# Patient Record
Sex: FEMALE | Race: OTHER
[De-identification: names, ages, dates, MRNs, and addresses within clinical notes are randomized per-mention and may not be internally consistent; named-entity substitution may affect disease eponyms.]

---

## 2018-06-26 ENCOUNTER — APPOINTMENT (OUTPATIENT)
Dept: SURGICAL ONCOLOGY | Facility: CLINIC | Age: 58
End: 2018-06-26

## 2021-10-18 ENCOUNTER — EMERGENCY (EMERGENCY)
Facility: HOSPITAL | Age: 61
LOS: 1 days | Discharge: HOME | End: 2021-10-18
Attending: EMERGENCY MEDICINE | Admitting: EMERGENCY MEDICINE
Payer: MEDICAID

## 2021-10-18 VITALS
SYSTOLIC BLOOD PRESSURE: 142 MMHG | OXYGEN SATURATION: 100 % | HEART RATE: 103 BPM | RESPIRATION RATE: 16 BRPM | TEMPERATURE: 98 F | DIASTOLIC BLOOD PRESSURE: 100 MMHG

## 2021-10-18 LAB
ALBUMIN SERPL ELPH-MCNC: 4.7 G/DL — SIGNIFICANT CHANGE UP (ref 3.3–5)
ALP SERPL-CCNC: 118 U/L — SIGNIFICANT CHANGE UP (ref 40–120)
ALT FLD-CCNC: 17 U/L — SIGNIFICANT CHANGE UP (ref 4–33)
ANION GAP SERPL CALC-SCNC: 13 MMOL/L — SIGNIFICANT CHANGE UP (ref 7–14)
AST SERPL-CCNC: 16 U/L — SIGNIFICANT CHANGE UP (ref 4–32)
BASOPHILS # BLD AUTO: 0.03 K/UL — SIGNIFICANT CHANGE UP (ref 0–0.2)
BASOPHILS NFR BLD AUTO: 0.4 % — SIGNIFICANT CHANGE UP (ref 0–2)
BILIRUB SERPL-MCNC: 0.2 MG/DL — SIGNIFICANT CHANGE UP (ref 0.2–1.2)
BUN SERPL-MCNC: 18 MG/DL — SIGNIFICANT CHANGE UP (ref 7–23)
CALCIUM SERPL-MCNC: 9.8 MG/DL — SIGNIFICANT CHANGE UP (ref 8.4–10.5)
CHLORIDE SERPL-SCNC: 104 MMOL/L — SIGNIFICANT CHANGE UP (ref 98–107)
CO2 SERPL-SCNC: 24 MMOL/L — SIGNIFICANT CHANGE UP (ref 22–31)
CREAT SERPL-MCNC: 0.88 MG/DL — SIGNIFICANT CHANGE UP (ref 0.5–1.3)
EOSINOPHIL # BLD AUTO: 0.09 K/UL — SIGNIFICANT CHANGE UP (ref 0–0.5)
EOSINOPHIL NFR BLD AUTO: 1.3 % — SIGNIFICANT CHANGE UP (ref 0–6)
GLUCOSE SERPL-MCNC: 119 MG/DL — HIGH (ref 70–99)
HCT VFR BLD CALC: 36.5 % — SIGNIFICANT CHANGE UP (ref 34.5–45)
HGB BLD-MCNC: 12.4 G/DL — SIGNIFICANT CHANGE UP (ref 11.5–15.5)
IANC: 4.75 K/UL — SIGNIFICANT CHANGE UP (ref 1.5–8.5)
IMM GRANULOCYTES NFR BLD AUTO: 0.3 % — SIGNIFICANT CHANGE UP (ref 0–1.5)
LYMPHOCYTES # BLD AUTO: 1.14 K/UL — SIGNIFICANT CHANGE UP (ref 1–3.3)
LYMPHOCYTES # BLD AUTO: 17 % — SIGNIFICANT CHANGE UP (ref 13–44)
MCHC RBC-ENTMCNC: 29.6 PG — SIGNIFICANT CHANGE UP (ref 27–34)
MCHC RBC-ENTMCNC: 34 GM/DL — SIGNIFICANT CHANGE UP (ref 32–36)
MCV RBC AUTO: 87.1 FL — SIGNIFICANT CHANGE UP (ref 80–100)
MONOCYTES # BLD AUTO: 0.69 K/UL — SIGNIFICANT CHANGE UP (ref 0–0.9)
MONOCYTES NFR BLD AUTO: 10.3 % — SIGNIFICANT CHANGE UP (ref 2–14)
NEUTROPHILS # BLD AUTO: 4.75 K/UL — SIGNIFICANT CHANGE UP (ref 1.8–7.4)
NEUTROPHILS NFR BLD AUTO: 70.7 % — SIGNIFICANT CHANGE UP (ref 43–77)
NRBC # BLD: 0 /100 WBCS — SIGNIFICANT CHANGE UP
NRBC # FLD: 0 K/UL — SIGNIFICANT CHANGE UP
PLATELET # BLD AUTO: 127 K/UL — LOW (ref 150–400)
POTASSIUM SERPL-MCNC: 3.5 MMOL/L — SIGNIFICANT CHANGE UP (ref 3.5–5.3)
POTASSIUM SERPL-SCNC: 3.5 MMOL/L — SIGNIFICANT CHANGE UP (ref 3.5–5.3)
PROT SERPL-MCNC: 7.9 G/DL — SIGNIFICANT CHANGE UP (ref 6–8.3)
RBC # BLD: 4.19 M/UL — SIGNIFICANT CHANGE UP (ref 3.8–5.2)
RBC # FLD: 12.8 % — SIGNIFICANT CHANGE UP (ref 10.3–14.5)
SODIUM SERPL-SCNC: 141 MMOL/L — SIGNIFICANT CHANGE UP (ref 135–145)
WBC # BLD: 6.72 K/UL — SIGNIFICANT CHANGE UP (ref 3.8–10.5)
WBC # FLD AUTO: 6.72 K/UL — SIGNIFICANT CHANGE UP (ref 3.8–10.5)

## 2021-10-18 PROCEDURE — 99285 EMERGENCY DEPT VISIT HI MDM: CPT

## 2021-10-18 PROCEDURE — 70450 CT HEAD/BRAIN W/O DYE: CPT | Mod: 26

## 2021-10-18 RX ORDER — TETANUS TOXOID, REDUCED DIPHTHERIA TOXOID AND ACELLULAR PERTUSSIS VACCINE, ADSORBED 5; 2.5; 8; 8; 2.5 [IU]/.5ML; [IU]/.5ML; UG/.5ML; UG/.5ML; UG/.5ML
0.5 SUSPENSION INTRAMUSCULAR ONCE
Refills: 0 | Status: COMPLETED | OUTPATIENT
Start: 2021-10-18 | End: 2021-10-18

## 2021-10-18 RX ADMIN — TETANUS TOXOID, REDUCED DIPHTHERIA TOXOID AND ACELLULAR PERTUSSIS VACCINE, ADSORBED 0.5 MILLILITER(S): 5; 2.5; 8; 8; 2.5 SUSPENSION INTRAMUSCULAR at 03:29

## 2021-10-18 NOTE — ED PROVIDER NOTE - NSFOLLOWUPINSTRUCTIONS_ED_ALL_ED_FT
YOU WERE SEEN IN THE EMERGENCY DEPARTMENT FOR Fall  WHILE HERE YOU HAD IMAGING AND LABS THAT SHOWED NO FRACTURES OR BRAIN BLEEDS.   PLEASE FOLLOW UP WITH YOUR PRIMARY CARE DOCTOR IN 3-5 DAYS.   DO NOT REMOVE THE STRIPS FROM YOUR HEAD. ALLOW THEM TO FALL OFF NATURALLY IN THE SHOWER.   PLEASE RETURN IF YOU HAVE DIZZINESS, EXCESSIVE SLEEPINESS OR PASS OUT.     Nonsutured Laceration Care      A laceration is a cut that may go through all layers of the skin and extend into the tissue that is right under the skin. This type of cut is usually stitched up (sutured) or closed with tape (adhesive strips) or skin glue shortly after the injury happens.    However, if the wound is dirty or if several hours pass before medical treatment is provided, it is likely that germs (bacteria) will enter the wound. Closing a laceration after bacteria have entered it increases the risk of infection. In these cases, your health care provider may leave the laceration open (nonsutured) and cover it with a bandage. This type of treatment helps prevent infection and allows the wound to heal from the deepest layer of tissue damage up to the surface.    An open fracture is a type of injury that may involve nonsutured lacerations. An open fracture is a break in a bone that happens along with lacerations through the skin at the fracture site.      What are the risks?  Caring for a nonsutured laceration is safe. However, problems may occur, including a higher risk for:  •Scarring.      •Infection.      •Slow healing.        Supplies needed:    •Soap.      •Hand .      •Sterile water or irrigation solution.      •Bandages (dressings).      •Clean towel.      •Antibiotic ointment.        How to care for your nonsutured laceration  Follow instructions from your health care provider about how to take care of your wound.  •Keep the wound clean and dry.      •Change any dressings as told by your health care provider. This includes changing the dressing when it starts to smell, or when it gets wet or dirty.    •Clean the wound one time each day, or as often as told by your health care provider. To clean your wound:  1.Wash your hands with soap and water. If soap and water are not available, use hand .      2.Remove any dressing as told by your health care provider.      3.Clean the wound with sterile water or irrigation solution as told by your health care provider.      4.Pat the wound dry with a clean towel. Do not rub the wound.      5.Apply a thin layer of antibiotic ointment to the wound as told by your health care provider. This will prevent infection and keep the dressing from sticking to the wound.      6.Apply a new dressing as told by your health care provider.      •Check your wound every day for signs of infection. Watch for:  •Redness, swelling, or pain.      •Fluid, blood, or pus.      •Bad smell on the wound or dressing.      •Warmth.        •Do not take baths, swim, or do anything that puts your wound underwater until your health care provider approves.      •Do not scratch or pick at the wound.        Follow these instructions at home:    •Take or apply over-the-counter and prescription medicines only as told by your health care provider.      •If you were prescribed an antibiotic medicine, take or apply it as told by your health care provider. Do not stop using the antibiotic even if your condition improves.      • Do not inject anything into the wound unless directed by your health care provider.      •Raise (elevate) the injured area above the level of your heart while you are sitting or lying down, if possible.    •If directed, put ice on the affected area:  •Put ice in a plastic bag.      •Place a towel between your skin and the bag.      •Leave the ice on for 20 minutes, 2–3 times a day.        •Keep all follow-up visits as told by your health care provider. This is important.        Contact a health care provider if:    •You received a tetanus shot and you have swelling, severe pain, redness, or bleeding at the injection site.      •You have a fever.      •Your pain is not controlled with medicine.      •You have increased redness, swelling, or pain at the site of your wound.      •You have fluid, blood, or pus coming from your wound.      •You notice a bad smell coming from your wound or your dressing.      •You notice something coming out of the wound, such as wood or glass.      •You notice a change in the color of your skin near your wound.      •You develop a new rash.      •You need to change the dressing frequently due to fluid, blood, or pus draining from the wound.      •You develop numbness around your wound.        Get help right away if:    •Your pain suddenly increases and is severe.      •You develop severe swelling around the wound.      •The wound is on your hand or foot and you cannot properly move a finger or toe.      •The wound is on your hand or foot, and you notice that your fingers or toes look pale or bluish.      •You have a red streak going away from your wound.        Summary    •A laceration is a cut that may go through all layers of the skin and extend into the tissue that is right under the skin. It is usually closed with stitches, tape, or skin glue shortly after the injury happens.      •If a wound is dirty or if several hours pass before medical treatment is provided, the laceration may be kept open (nonsutured) and covered with a bandage.      •This type of treatment helps prevent infection and allows the wound to heal from the deepest layer of tissue damage up to the surface.      •Follow instructions from your health care provider about how to take care of your wound.      This information is not intended to replace advice given to you by your health care provider. Make sure you discuss any questions you have with your health care provider.

## 2021-10-18 NOTE — ED PROVIDER NOTE - OBJECTIVE STATEMENT
62 yo f pm seizure disorder on Keppra presents after mechanical fall, no on blood thinners or aspirin. Pt was standing on a chair to reach a shelf when her foot got caught on the chair and she fell of. Pt denies any presyncope, cp, palpitations, sob before the fall. pt remembers the fall and denies having a seizure at the time. she states there was a 3 in diameter puddle of blood on the floor. denies n/v/f/c

## 2021-10-18 NOTE — ED PROVIDER NOTE - NS ED ROS FT
CONST: no fevers, no chills  EYES: no pain, no blurry vision   ENT: no sore throat, no epistaxis, no rhinorrhea  CV: no chest pain, no palpitations, no orthopnea, no extremity pain or swelling  RESP: no shortness of breath, no cough, no sputum, no pleurisy, no wheezing  ABD: no abdominal pain, no nausea, no vomiting, no diarrhea, no black or bloody stool  : no dysuria, no hematuria, no frequency, no urgency  MSK: no back pain, no neck pain, no extremity pain  NEURO: no sensory disturbances, no focal weakness, no dizziness  HEME: no easy bleeding or bruising  SKIN: no diaphoresis, no rash

## 2021-10-18 NOTE — ED ADULT NURSE NOTE - CHIEF COMPLAINT QUOTE
Pt. was standing on a chair and reaching for something and she fell. Possible hit head on closet door. Superficial abrasion noted on top of head. Son states there was a lot of blood on the floor. c/o dizziness at triage. Placed on stretcher. NAD noted. Denies LOC, vomiting, or lethargy. Pema(daughter):  [phone] , .

## 2021-10-18 NOTE — ED PROVIDER NOTE - CLINICAL SUMMARY MEDICAL DECISION MAKING FREE TEXT BOX
60 yo f pm seizure disorder on Keppra presents after mechanical fall, no on blood thinners or aspirin. 60 yo f pm seizure disorder on Keppra presents after mechanical fall, no on blood thinners or aspirin. labs, tdap, img. 60 yo f pm seizure disorder on Keppra presents after mechanical fall, not on blood thinners or aspirin. labs, tdap, img.

## 2021-10-18 NOTE — ED PROVIDER NOTE - PHYSICAL EXAMINATION
Const: Well-nourished, Well-developed, appearing stated age.  Eyes: no conjunctival injection, and symmetrical lids.  HEENT: 1 cm abrasion, currently non bleeding. no palpable masses.   Neck: Symmetric, trachea midline.   CVS: +S1/S2, Peripheral pulses 2+ and equal in all extremities.  RESP: Unlabored respiratory effort. Clear to auscultation bilaterally.  GI: Nontender/Nondistended, No CVA tenderness b/l.   MSK: Normocephalic/Atraumatic, Lower Extremities w/o calf tenderness or edema b/l.   Skin: Warm, dry and intact.   Neuro: CNs II-XII grossly intact. Motor & Sensation grossly intact.  Psych: Awake, Alert, & Oriented (AAO) x3. Appropriate mood and affect.

## 2021-10-18 NOTE — ED ADULT TRIAGE NOTE - CHIEF COMPLAINT QUOTE
Pt. was standing on a chair and reaching for something and she fell. Possible hit head on closet door. Superficial abrasion noted on top of head. Son states there was a lot of blood on the floor. c/o dizziness at triage. Placed on stretcher. NAD noted. Denies LOC, vomiting, or lethargy. Pt. was standing on a chair and reaching for something and she fell. Possible hit head on closet door. Superficial abrasion noted on top of head. Son states there was a lot of blood on the floor. c/o dizziness at triage. Placed on stretcher. NAD noted. Denies LOC, vomiting, or lethargy. Pema(daughter):  [phone] Pt. was standing on a chair and reaching for something and she fell. Possible hit head on closet door. Superficial abrasion noted on top of head. Son states there was a lot of blood on the floor. c/o dizziness at triage. Placed on stretcher. NAD noted. Denies LOC, vomiting, or lethargy. Pema(daughter):  [phone] , .

## 2021-10-18 NOTE — ED PROVIDER NOTE - ATTENDING CONTRIBUTION TO CARE
60yo F PMHX seizures on keppra p/w mechanical fall after foot got caught in chair and fell with +head trauma and laceration sustained.  Says she felt dizzy after seeing blood on floor from her head laceration but no LOC. Admits to 'a lot of blood' on the floor but unable to quantify amount    A/P  mechanical fall with laceration sustained  CTH, labs to check H/H, Tdap, lac repair 62yo F PMHX seizures on keppra p/w mechanical fall after foot got caught in chair and fell with +head trauma and laceration sustained.  Says she felt dizzy after seeing blood on floor from her head laceration but no LOC. Admits to 'a lot of blood' on the floor but unable to quantify amount. Has a headache near site of laceration but no other complaints- no facial pain, no neck or back pain, no chest or abd pain, no extremity pain. has been able to walk after fall    General: Patient alert in no apparent distress  Skin: Laceration ~1cm not bleeding to parietal scalp, otherwise dry and intact  HEENT: lac as above, otherwise Head atraumatic. Oral mucosa moist.   Eyes: Conjunctiva normal  Cardiac: Regular rhythm and rate. No pretibial edema b/l  Respiratory: Lungs clear b/l and symmetric. No respiratory distress. Able to speak in complete sentences.  Gastrointestinal: Abdomen soft, nondistended, nontender  Musculoskeletal: Moves all extremities spontaneously  Neurological: alert and oriented to person, place, and time  Psychiatric: Calm and cooperative    A/P  mechanical fall with laceration sustained  CTH, labs to check H/H, Tdap, lac repair

## 2021-10-18 NOTE — ED PROVIDER NOTE - PATIENT PORTAL LINK FT
You can access the FollowMyHealth Patient Portal offered by [facility] by registering at the following website: [email or web address]. By joining [facility]’s FollowMyHealth portal, you will also be able to view your health information using other applications (apps) compatible with our system.

## 2021-12-21 ENCOUNTER — EMERGENCY (EMERGENCY)
Facility: HOSPITAL | Age: 61
LOS: 1 days | Discharge: HOME | End: 2021-12-21
Attending: STUDENT IN AN ORGANIZED HEALTH CARE EDUCATION/TRAINING PROGRAM | Admitting: STUDENT IN AN ORGANIZED HEALTH CARE EDUCATION/TRAINING PROGRAM
Payer: MEDICAID

## 2021-12-21 VITALS
HEART RATE: 77 BPM | OXYGEN SATURATION: 100 % | SYSTOLIC BLOOD PRESSURE: 130 MMHG | DIASTOLIC BLOOD PRESSURE: 72 MMHG | TEMPERATURE: 98 F | RESPIRATION RATE: 18 BRPM

## 2021-12-21 VITALS
SYSTOLIC BLOOD PRESSURE: 106 MMHG | RESPIRATION RATE: 18 BRPM | TEMPERATURE: 98 F | DIASTOLIC BLOOD PRESSURE: 65 MMHG | HEART RATE: 83 BPM | OXYGEN SATURATION: 100 %

## 2021-12-21 LAB
ALBUMIN SERPL ELPH-MCNC: 4.1 G/DL — SIGNIFICANT CHANGE UP (ref 3.3–5)
ALP SERPL-CCNC: 81 U/L — SIGNIFICANT CHANGE UP (ref 40–120)
ALT FLD-CCNC: 16 U/L — SIGNIFICANT CHANGE UP (ref 4–33)
ANION GAP SERPL CALC-SCNC: 10 MMOL/L — SIGNIFICANT CHANGE UP (ref 7–14)
AST SERPL-CCNC: 12 U/L — SIGNIFICANT CHANGE UP (ref 4–32)
BASOPHILS # BLD AUTO: 0.04 K/UL — SIGNIFICANT CHANGE UP (ref 0–0.2)
BASOPHILS NFR BLD AUTO: 0.4 % — SIGNIFICANT CHANGE UP (ref 0–2)
BILIRUB SERPL-MCNC: 0.2 MG/DL — SIGNIFICANT CHANGE UP (ref 0.2–1.2)
BUN SERPL-MCNC: 23 MG/DL — SIGNIFICANT CHANGE UP (ref 7–23)
CALCIUM SERPL-MCNC: 9.2 MG/DL — SIGNIFICANT CHANGE UP (ref 8.4–10.5)
CHLORIDE SERPL-SCNC: 106 MMOL/L — SIGNIFICANT CHANGE UP (ref 98–107)
CO2 SERPL-SCNC: 24 MMOL/L — SIGNIFICANT CHANGE UP (ref 22–31)
CREAT SERPL-MCNC: 1.01 MG/DL — SIGNIFICANT CHANGE UP (ref 0.5–1.3)
EOSINOPHIL # BLD AUTO: 0.06 K/UL — SIGNIFICANT CHANGE UP (ref 0–0.5)
EOSINOPHIL NFR BLD AUTO: 0.6 % — SIGNIFICANT CHANGE UP (ref 0–6)
GLUCOSE SERPL-MCNC: 96 MG/DL — SIGNIFICANT CHANGE UP (ref 70–99)
HCT VFR BLD CALC: 33.4 % — LOW (ref 34.5–45)
HGB BLD-MCNC: 11.3 G/DL — LOW (ref 11.5–15.5)
IANC: 7.81 K/UL — SIGNIFICANT CHANGE UP (ref 1.5–8.5)
IMM GRANULOCYTES NFR BLD AUTO: 0.3 % — SIGNIFICANT CHANGE UP (ref 0–1.5)
LYMPHOCYTES # BLD AUTO: 1.25 K/UL — SIGNIFICANT CHANGE UP (ref 1–3.3)
LYMPHOCYTES # BLD AUTO: 12.7 % — LOW (ref 13–44)
MCHC RBC-ENTMCNC: 29.4 PG — SIGNIFICANT CHANGE UP (ref 27–34)
MCHC RBC-ENTMCNC: 33.8 GM/DL — SIGNIFICANT CHANGE UP (ref 32–36)
MCV RBC AUTO: 87 FL — SIGNIFICANT CHANGE UP (ref 80–100)
MONOCYTES # BLD AUTO: 0.68 K/UL — SIGNIFICANT CHANGE UP (ref 0–0.9)
MONOCYTES NFR BLD AUTO: 6.9 % — SIGNIFICANT CHANGE UP (ref 2–14)
NEUTROPHILS # BLD AUTO: 7.81 K/UL — HIGH (ref 1.8–7.4)
NEUTROPHILS NFR BLD AUTO: 79.1 % — HIGH (ref 43–77)
NRBC # BLD: 0 /100 WBCS — SIGNIFICANT CHANGE UP
NRBC # FLD: 0 K/UL — SIGNIFICANT CHANGE UP
PLATELET # BLD AUTO: 152 K/UL — SIGNIFICANT CHANGE UP (ref 150–400)
POTASSIUM SERPL-MCNC: 4.4 MMOL/L — SIGNIFICANT CHANGE UP (ref 3.5–5.3)
POTASSIUM SERPL-SCNC: 4.4 MMOL/L — SIGNIFICANT CHANGE UP (ref 3.5–5.3)
PROT SERPL-MCNC: 6.8 G/DL — SIGNIFICANT CHANGE UP (ref 6–8.3)
RBC # BLD: 3.84 M/UL — SIGNIFICANT CHANGE UP (ref 3.8–5.2)
RBC # FLD: 12.5 % — SIGNIFICANT CHANGE UP (ref 10.3–14.5)
SODIUM SERPL-SCNC: 140 MMOL/L — SIGNIFICANT CHANGE UP (ref 135–145)
WBC # BLD: 9.87 K/UL — SIGNIFICANT CHANGE UP (ref 3.8–10.5)
WBC # FLD AUTO: 9.87 K/UL — SIGNIFICANT CHANGE UP (ref 3.8–10.5)

## 2021-12-21 PROCEDURE — 99284 EMERGENCY DEPT VISIT MOD MDM: CPT | Mod: 25

## 2021-12-21 PROCEDURE — 93010 ELECTROCARDIOGRAM REPORT: CPT

## 2021-12-21 RX ORDER — SODIUM CHLORIDE 9 MG/ML
1000 INJECTION INTRAMUSCULAR; INTRAVENOUS; SUBCUTANEOUS ONCE
Refills: 0 | Status: COMPLETED | OUTPATIENT
Start: 2021-12-21 | End: 2021-12-21

## 2021-12-21 RX ORDER — ONDANSETRON 8 MG/1
4 TABLET, FILM COATED ORAL ONCE
Refills: 0 | Status: COMPLETED | OUTPATIENT
Start: 2021-12-21 | End: 2021-12-21

## 2021-12-21 RX ADMIN — SODIUM CHLORIDE 1000 MILLILITER(S): 9 INJECTION INTRAMUSCULAR; INTRAVENOUS; SUBCUTANEOUS at 18:35

## 2021-12-21 NOTE — ED PROVIDER NOTE - NSFOLLOWUPINSTRUCTIONS_ED_ALL_ED_FT
Rest, drink plenty of fluids.  Advance activity as tolerated.  Continue all previously prescribed medications as directed.  Follow up with your primary care physician in 48-72 hours- bring copies of your results.  Return to the ER for worsening or persistent symptoms, and/or ANY NEW OR CONCERNING SYMPTOMS. If you have issues obtaining follow up, please call: [phone] to obtain a doctor or specialist who takes your insurance in your area.  You may call [phone] to make an appointment with the internal medicine clinic.    Please follow up with the primary care doctor

## 2021-12-21 NOTE — ED ADULT TRIAGE NOTE - CHIEF COMPLAINT QUOTE
Pt brought in by EMS for an episode of dizziness after using the bathroom. Denies LOC. Pt states symptoms has no resolved. Denies chest pain/sob. Pt states she takes keppra.

## 2021-12-21 NOTE — ED PROVIDER NOTE - ATTENDING CONTRIBUTION TO CARE
I have personally performed a face to face medical and diagnostic evaluation of the patient. I have discussed with and reviewed the ACP's note and agree with the History, ROS, Physical Exam and MDM unless otherwise indicated. A brief summary of my personal evaluation and impression can be found below.    61F hx of HTN seizure on Keppra compliant with meds presents with a cc of lightheadedness and dizziness in setting of having diarrheal episode today NB, felt dizzy then went to bathroom and felt dizzy, laid self on floor no head trauma no LOC recalls entire event upon ED arrival feels better, no sx at this time, episode not c/w seizure activity recalls entire event. Denies numbness, tingling or loss of sensation. Denies loss of motor function. NO CP SOB or OG or palpitations prior to episode. No urinary complaints. Denies n/v/f/c/cp/sob. Denies headache, syncope, Denies chest palpitations, abdominal pain. Denies edema. Denies dysuria, hematuria, BRBPR, tarry stools, constipation.     All other ROS negative, except as above and as per HPI and ROS section.    VITALS: Initial triage and subsequent vitals have been reviewed by me.  GEN APPEARANCE: WDWN, alert, non-toxic, NAD  HEAD: Atraumatic.  EYES: PERRLa, EOMI, vision grossly intact.   NECK: Supple  CV: RRR, S1S2, no c/r/m/g. Cap refill <2 seconds. No bruits.   LUNGS: CTAB. No abnormal breath sounds.  ABDOMEN: Soft, NTND. No guarding or rebound.   MSK/EXT: No spinal or extremity point tenderness. No CVA ttp. Pelvis stable. No peripheral edema.  NEURO: Alert, follows commands. Weight bearing normal. Speech normal. Sensation and motor normal x4 extremities. CN2-12 normal, coordination normal, ambulating normally.   SKIN: Warm, dry and intact. No rash.  PSYCH: Appropriate    Plan/MDM: 61F hx of HTN seizure on Keppra compliant with meds presents with a cc of lightheadedness and dizziness in setting of having diarrheal episode today NB, felt dizzy then went to bathroom and felt dizzy, laid self on floor no head trauma no LOC recalls entire event upon ED arrival feels better, no sx at this time, episode not c/w seizure activity recalls entire event, c/f likely vasovagal near syncope event in the setting of diarrhea x1, will check labs give meds fluids reassess thereafter. ekg at baseline.

## 2021-12-21 NOTE — ED PROVIDER NOTE - PATIENT PORTAL LINK FT
You can access the FollowMyHealth Patient Portal offered by [facility] by registering at the following website: [email or web address]. By joining [facility]’s FollowMyHealth portal, you will also be able to view your health information using other applications (apps) compatible with our system.

## 2021-12-21 NOTE — ED PROVIDER NOTE - OBJECTIVE STATEMENT
this is a 61 y.o female with a PMhx of seizures on keppra hasn't had a seizure in 5 years. 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 She felt better after her diarrhea, and she laid down for a bit,. she did not have any episodes of vomiting or nausea. She states that she has been eating and drinking without difficulties. she denies having any dysuria, hematuria, urinary urgency or frequency. She did not have a seizures, she did not have any LOC, or CP, SOB, or OG.

## 2022-01-24 ENCOUNTER — EMERGENCY (EMERGENCY)
Facility: HOSPITAL | Age: 62
LOS: 1 days | Discharge: HOME | End: 2022-01-24
Attending: EMERGENCY MEDICINE | Admitting: EMERGENCY MEDICINE
Payer: MEDICAID

## 2022-01-24 VITALS
HEART RATE: 113 BPM | TEMPERATURE: 99 F | OXYGEN SATURATION: 100 % | SYSTOLIC BLOOD PRESSURE: 177 MMHG | RESPIRATION RATE: 18 BRPM | DIASTOLIC BLOOD PRESSURE: 93 MMHG

## 2022-01-24 PROCEDURE — 99284 EMERGENCY DEPT VISIT MOD MDM: CPT

## 2022-01-24 NOTE — ED ADULT NURSE NOTE - OBJECTIVE STATEMENT
pt received to rm 28 , a&ox4 Sky speaking. pt daughter at bedside , ambulatory , pmh of HTN, seizure on keppra , p/w b/l upper and lower extremities numbness and tingling w/ no change to motor function dizziness and diarrhea since yesterday . Pt breathing even and unlabored on room air. NSR on cardiac monitor. Denies fever, chills, cough, SOB, chest pain, palpitations, constipation. pt educated on fall precautions and confirms understanding via teach back method. Stretcher locked in lowest position with siderails up x2. Call bell and personal items within reach.

## 2022-01-24 NOTE — ED ADULT TRIAGE NOTE - CHIEF COMPLAINT QUOTE
Pt c/o dizziness x 1 month. Reports dizziness worsening over past few days w/ increased lethargy & numbness and tingling sensation to bilateral feet and hands. Denies headache, blurry vision, CP or SOB. Reports she is compliant with medications. PMHx Epilepsy, HTN Pt c/o dizziness x 1 month. Reports dizziness worsening over past few days w/ increased lethargy & numbness and tingling sensation to bilateral feet and hands. Denies headache, blurry vision, CP or SOB. Reports she is compliant with medications. FS by . PMHx Epilepsy, HTN

## 2022-01-24 NOTE — ED PROVIDER NOTE - OBJECTIVE STATEMENT
61 y.o female with a PMHx of seizures, HTN - on kescar hasn't had a seizure in 5 years. 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 She felt better after her diarrhea, and she laid down for a bit,. she did not have any episodes of vomiting or nausea. She states that she has been eating and drinking without difficulties. she denies having any dysuria, hematuria, urinary urgency or frequency. She did not have a seizures, she did not have any LOC, or CP, SOB, or OG. 61 y.o female with a PMHx of seizures, HTN - on keppra hasn't had a seizure in 5 years presenting for persistent dizziness.   Patient is Sky speaking - declined  as patient's daughter is at bedside to assist with translation. Patient reports that she received her COVID vaccine (Pfizer x3 - last dose 12/2021). Of note, patient began having dizziness in 12/2021, at which time she tested positive for COVID, shortly thereafter she received her booster vaccine. Since testing positive, patient has been experiencing persistent dizziness, positional in nature. Patient presented to the ED 12/2021 with similar presentation at which time the dizziness was attributed to dehydration. Since then, patient has been having persistent dizziness despite adequate hydration. She denies having any dysuria, hematuria, urinary urgency or frequency. She did not have a seizures, she did not have any LOC, or CP, SOB, or OG.

## 2022-01-24 NOTE — ED PROVIDER NOTE - PROGRESS NOTE DETAILS
Patient clinically improved after IVF, dizziness resolved. Plan to discharge with outpatient follow-up.

## 2022-01-24 NOTE — ED PROVIDER NOTE - PATIENT PORTAL LINK FT
You can access the FollowMyHealth Patient Portal offered by [facility] by registering at the following website: [email or web address]. By joining [facility]’s FollowMyHealth portal, you will also be able to view your health information using other applications (apps) compatible with our system.

## 2022-01-24 NOTE — ED PROVIDER NOTE - PHYSICAL EXAMINATION
Constitutional: NAD, lying comfortably in bed   Eyes: PERRL, sclera clear  ENMT: MMM, clear oropharynx  Neck: Supple, no cervical LAD  Respiratory: CTAB, no rales, rhonchi or wheezes  Cardiovascular: tachycardic, no m/g/r  Gastrointestinal: +BS, soft, NT/ND  Neurological: AAOx3  Psychiatric: Appropriate affect and mood  Extremities: No LE edema

## 2022-01-24 NOTE — ED PROVIDER NOTE - CLINICAL SUMMARY MEDICAL DECISION MAKING FREE TEXT BOX
Shyann Boyd M.D. - in this physician's medical judgment based on clinical history and physical exam - patient presenting with dizziness - likely orthostatic vs ?COVID related POTS.     Will order EKG, CBC, CMP, orthostatics and monitor.     Will follow-up on labs, analgesia, imaging, reassess and disposition as clinically indicated.

## 2022-01-24 NOTE — ED PROVIDER NOTE - NS ED ROS FT
General: +dizziness  Eyes: Denies blurry vision  ENMT: Denies rhinorrhea  Respiratory: Denies cough, SOB  Cardiovascular: Denies palpitations, CP  Gastrointestinal: Denies abd pain, N/V/D/C, hematochezia, melena  : Denies dysuria, increased freq  Musculoskeletal: Denies edema, joint pain  Endocrine: Denies increased thirst, increased frequency  Allergic/Immunologic: Denies rashes or hives  Neuro: Denies weakness, numbness  Psych: Denies anxiety, depression

## 2022-01-24 NOTE — ED ADULT NURSE NOTE - CHIEF COMPLAINT QUOTE
Pt c/o dizziness x 1 month. Reports dizziness worsening over past few days w/ increased lethargy & numbness and tingling sensation to bilateral feet and hands. Denies headache, blurry vision, CP or SOB. Reports she is compliant with medications. FS by . PMHx Epilepsy, HTN

## 2022-01-24 NOTE — ED PROVIDER NOTE - NSFOLLOWUPINSTRUCTIONS_ED_ALL_ED_FT
Follow up with your medical doctor in 2-3 days or call our clinic at [phone] and state you were seen in the Emergency Department and would like to be seen in clinic.   Take Tylenol 1g every six hours and supplement with ibuprofen 600mg, with food, every six hours which can be taken three hours apart from the Tylenol to have a layered effect.  Drink at least 2 Liters or 64 Ounces of water each day.  Return for any persistent, worsening symptoms, or ANY concerns at all.

## 2022-01-24 NOTE — ED PROVIDER NOTE - ATTENDING CONTRIBUTION TO CARE
DR. RILEY, ATTENDING MD-  I performed a face to face bedside interview with the patient regarding history of present illness, review of symptoms and past medical history. I completed an independent physical exam.  I have discussed the patient's plan of care with the resident.   Documentation as above in the note.    62 y/o female h/o seizures on keppra, htn here with int lightheadedness x1 month.  Additional c/o numbness and tingling to both hands and feet.  No fnd's on exam.  Eval for lyte abn, dehydration, anemia.  Obtain ekg cbc cmp tsh give ivf bolus reassess, likely dc with pcp f/u as o/p.

## 2022-01-24 NOTE — ED PROVIDER NOTE - NSFOLLOWUPCLINICS_GEN_ALL_ED_FT
[facility]  Neurology  [address] [place] [address] [address] [place] [address]  Phone: [phone]  Fax:

## 2022-01-25 VITALS
OXYGEN SATURATION: 99 % | SYSTOLIC BLOOD PRESSURE: 160 MMHG | RESPIRATION RATE: 16 BRPM | TEMPERATURE: 98 F | DIASTOLIC BLOOD PRESSURE: 84 MMHG | HEART RATE: 94 BPM

## 2022-01-25 LAB
ALBUMIN SERPL ELPH-MCNC: 3.9 G/DL — SIGNIFICANT CHANGE UP (ref 3.3–5)
ALP SERPL-CCNC: 107 U/L — SIGNIFICANT CHANGE UP (ref 40–120)
ALT FLD-CCNC: 18 U/L — SIGNIFICANT CHANGE UP (ref 4–33)
ANION GAP SERPL CALC-SCNC: 9 MMOL/L — SIGNIFICANT CHANGE UP (ref 7–14)
APPEARANCE UR: CLEAR — SIGNIFICANT CHANGE UP
AST SERPL-CCNC: 16 U/L — SIGNIFICANT CHANGE UP (ref 4–32)
BASOPHILS # BLD AUTO: 0.04 K/UL — SIGNIFICANT CHANGE UP (ref 0–0.2)
BASOPHILS NFR BLD AUTO: 0.6 % — SIGNIFICANT CHANGE UP (ref 0–2)
BILIRUB SERPL-MCNC: 0.2 MG/DL — SIGNIFICANT CHANGE UP (ref 0.2–1.2)
BILIRUB UR-MCNC: NEGATIVE — SIGNIFICANT CHANGE UP
BUN SERPL-MCNC: 14 MG/DL — SIGNIFICANT CHANGE UP (ref 7–23)
CALCIUM SERPL-MCNC: 8.9 MG/DL — SIGNIFICANT CHANGE UP (ref 8.4–10.5)
CHLORIDE SERPL-SCNC: 103 MMOL/L — SIGNIFICANT CHANGE UP (ref 98–107)
CO2 SERPL-SCNC: 26 MMOL/L — SIGNIFICANT CHANGE UP (ref 22–31)
COLOR SPEC: COLORLESS — SIGNIFICANT CHANGE UP
CREAT SERPL-MCNC: 0.82 MG/DL — SIGNIFICANT CHANGE UP (ref 0.5–1.3)
DIFF PNL FLD: NEGATIVE — SIGNIFICANT CHANGE UP
EOSINOPHIL # BLD AUTO: 0.09 K/UL — SIGNIFICANT CHANGE UP (ref 0–0.5)
EOSINOPHIL NFR BLD AUTO: 1.3 % — SIGNIFICANT CHANGE UP (ref 0–6)
GLUCOSE SERPL-MCNC: 172 MG/DL — HIGH (ref 70–99)
GLUCOSE UR QL: NEGATIVE — SIGNIFICANT CHANGE UP
HCT VFR BLD CALC: 35.1 % — SIGNIFICANT CHANGE UP (ref 34.5–45)
HGB BLD-MCNC: 11.4 G/DL — LOW (ref 11.5–15.5)
IANC: 4.18 K/UL — SIGNIFICANT CHANGE UP (ref 1.5–8.5)
IMM GRANULOCYTES NFR BLD AUTO: 0.6 % — SIGNIFICANT CHANGE UP (ref 0–1.5)
KETONES UR-MCNC: NEGATIVE — SIGNIFICANT CHANGE UP
LEUKOCYTE ESTERASE UR-ACNC: NEGATIVE — SIGNIFICANT CHANGE UP
LYMPHOCYTES # BLD AUTO: 1.72 K/UL — SIGNIFICANT CHANGE UP (ref 1–3.3)
LYMPHOCYTES # BLD AUTO: 25.4 % — SIGNIFICANT CHANGE UP (ref 13–44)
MAGNESIUM SERPL-MCNC: 2 MG/DL — SIGNIFICANT CHANGE UP (ref 1.6–2.6)
MCHC RBC-ENTMCNC: 28.9 PG — SIGNIFICANT CHANGE UP (ref 27–34)
MCHC RBC-ENTMCNC: 32.5 GM/DL — SIGNIFICANT CHANGE UP (ref 32–36)
MCV RBC AUTO: 89.1 FL — SIGNIFICANT CHANGE UP (ref 80–100)
MONOCYTES # BLD AUTO: 0.71 K/UL — SIGNIFICANT CHANGE UP (ref 0–0.9)
MONOCYTES NFR BLD AUTO: 10.5 % — SIGNIFICANT CHANGE UP (ref 2–14)
NEUTROPHILS # BLD AUTO: 4.18 K/UL — SIGNIFICANT CHANGE UP (ref 1.8–7.4)
NEUTROPHILS NFR BLD AUTO: 61.6 % — SIGNIFICANT CHANGE UP (ref 43–77)
NITRITE UR-MCNC: NEGATIVE — SIGNIFICANT CHANGE UP
NRBC # BLD: 0 /100 WBCS — SIGNIFICANT CHANGE UP
NRBC # FLD: 0 K/UL — SIGNIFICANT CHANGE UP
PH UR: 7 — SIGNIFICANT CHANGE UP (ref 5–8)
PHOSPHATE SERPL-MCNC: 3 MG/DL — SIGNIFICANT CHANGE UP (ref 2.5–4.5)
PLATELET # BLD AUTO: 144 K/UL — LOW (ref 150–400)
POTASSIUM SERPL-MCNC: 3.4 MMOL/L — LOW (ref 3.5–5.3)
POTASSIUM SERPL-SCNC: 3.4 MMOL/L — LOW (ref 3.5–5.3)
PROT SERPL-MCNC: 7.4 G/DL — SIGNIFICANT CHANGE UP (ref 6–8.3)
PROT UR-MCNC: ABNORMAL
RBC # BLD: 3.94 M/UL — SIGNIFICANT CHANGE UP (ref 3.8–5.2)
RBC # FLD: 12.6 % — SIGNIFICANT CHANGE UP (ref 10.3–14.5)
SODIUM SERPL-SCNC: 138 MMOL/L — SIGNIFICANT CHANGE UP (ref 135–145)
SP GR SPEC: 1 — SIGNIFICANT CHANGE UP (ref 1–1.05)
UROBILINOGEN FLD QL: SIGNIFICANT CHANGE UP
WBC # BLD: 6.78 K/UL — SIGNIFICANT CHANGE UP (ref 3.8–10.5)
WBC # FLD AUTO: 6.78 K/UL — SIGNIFICANT CHANGE UP (ref 3.8–10.5)

## 2022-01-25 RX ORDER — POTASSIUM CHLORIDE 20 MEQ
40 PACKET (EA) ORAL ONCE
Refills: 0 | Status: COMPLETED | OUTPATIENT
Start: 2022-01-25 | End: 2022-01-25

## 2022-01-25 RX ORDER — SODIUM CHLORIDE 9 MG/ML
1000 INJECTION INTRAMUSCULAR; INTRAVENOUS; SUBCUTANEOUS ONCE
Refills: 0 | Status: COMPLETED | OUTPATIENT
Start: 2022-01-25 | End: 2022-01-25

## 2022-01-25 RX ADMIN — SODIUM CHLORIDE 1000 MILLILITER(S): 9 INJECTION INTRAMUSCULAR; INTRAVENOUS; SUBCUTANEOUS at 00:42

## 2022-01-25 RX ADMIN — Medication 40 MILLIEQUIVALENT(S): at 02:13

## 2022-01-25 NOTE — ED ADULT NURSE REASSESSMENT NOTE - NS ED NURSE REASSESS COMMENT FT1
PT is resting in stretcher, easily arousable to verbal stimuli. no apparent distress noted. will continue to monitor. PT is resting in stretcher, easily arousable to verbal stimuli. no apparent distress noted. pt ambulated to the bathroom with out difficulty with an even and steady gait. will continue to monitor.

## 2022-02-06 ENCOUNTER — EMERGENCY (EMERGENCY)
Facility: HOSPITAL | Age: 62
LOS: 1 days | Discharge: HOME | End: 2022-02-06
Attending: EMERGENCY MEDICINE | Admitting: EMERGENCY MEDICINE
Payer: MEDICAID

## 2022-02-06 VITALS
TEMPERATURE: 98 F | SYSTOLIC BLOOD PRESSURE: 145 MMHG | HEART RATE: 89 BPM | RESPIRATION RATE: 18 BRPM | OXYGEN SATURATION: 100 % | DIASTOLIC BLOOD PRESSURE: 74 MMHG

## 2022-02-06 VITALS
DIASTOLIC BLOOD PRESSURE: 77 MMHG | HEART RATE: 68 BPM | OXYGEN SATURATION: 99 % | SYSTOLIC BLOOD PRESSURE: 135 MMHG | TEMPERATURE: 98 F | RESPIRATION RATE: 18 BRPM

## 2022-02-06 PROBLEM — R56.9 UNSPECIFIED CONVULSIONS: Chronic | Status: ACTIVE | Noted: 2022-01-24

## 2022-02-06 LAB
ALBUMIN SERPL ELPH-MCNC: 4 G/DL — SIGNIFICANT CHANGE UP (ref 3.3–5)
ALP SERPL-CCNC: 126 U/L — HIGH (ref 40–120)
ALT FLD-CCNC: 31 U/L — SIGNIFICANT CHANGE UP (ref 4–33)
ANION GAP SERPL CALC-SCNC: 10 MMOL/L — SIGNIFICANT CHANGE UP (ref 7–14)
APPEARANCE UR: CLEAR — SIGNIFICANT CHANGE UP
AST SERPL-CCNC: 24 U/L — SIGNIFICANT CHANGE UP (ref 4–32)
BASOPHILS # BLD AUTO: 0.03 K/UL — SIGNIFICANT CHANGE UP (ref 0–0.2)
BASOPHILS NFR BLD AUTO: 0.5 % — SIGNIFICANT CHANGE UP (ref 0–2)
BILIRUB SERPL-MCNC: 0.2 MG/DL — SIGNIFICANT CHANGE UP (ref 0.2–1.2)
BILIRUB UR-MCNC: NEGATIVE — SIGNIFICANT CHANGE UP
BUN SERPL-MCNC: 15 MG/DL — SIGNIFICANT CHANGE UP (ref 7–23)
CALCIUM SERPL-MCNC: 9.4 MG/DL — SIGNIFICANT CHANGE UP (ref 8.4–10.5)
CHLORIDE SERPL-SCNC: 107 MMOL/L — SIGNIFICANT CHANGE UP (ref 98–107)
CO2 SERPL-SCNC: 24 MMOL/L — SIGNIFICANT CHANGE UP (ref 22–31)
COLOR SPEC: COLORLESS — SIGNIFICANT CHANGE UP
CREAT SERPL-MCNC: 0.82 MG/DL — SIGNIFICANT CHANGE UP (ref 0.5–1.3)
DIFF PNL FLD: NEGATIVE — SIGNIFICANT CHANGE UP
EOSINOPHIL # BLD AUTO: 0.12 K/UL — SIGNIFICANT CHANGE UP (ref 0–0.5)
EOSINOPHIL NFR BLD AUTO: 2 % — SIGNIFICANT CHANGE UP (ref 0–6)
GLUCOSE SERPL-MCNC: 90 MG/DL — SIGNIFICANT CHANGE UP (ref 70–99)
GLUCOSE UR QL: NEGATIVE — SIGNIFICANT CHANGE UP
HCT VFR BLD CALC: 37.9 % — SIGNIFICANT CHANGE UP (ref 34.5–45)
HGB BLD-MCNC: 12.7 G/DL — SIGNIFICANT CHANGE UP (ref 11.5–15.5)
IANC: 3.77 K/UL — SIGNIFICANT CHANGE UP (ref 1.5–8.5)
IMM GRANULOCYTES NFR BLD AUTO: 0.5 % — SIGNIFICANT CHANGE UP (ref 0–1.5)
KETONES UR-MCNC: NEGATIVE — SIGNIFICANT CHANGE UP
LEUKOCYTE ESTERASE UR-ACNC: NEGATIVE — SIGNIFICANT CHANGE UP
LYMPHOCYTES # BLD AUTO: 1.61 K/UL — SIGNIFICANT CHANGE UP (ref 1–3.3)
LYMPHOCYTES # BLD AUTO: 26.4 % — SIGNIFICANT CHANGE UP (ref 13–44)
MCHC RBC-ENTMCNC: 29.4 PG — SIGNIFICANT CHANGE UP (ref 27–34)
MCHC RBC-ENTMCNC: 33.5 GM/DL — SIGNIFICANT CHANGE UP (ref 32–36)
MCV RBC AUTO: 87.7 FL — SIGNIFICANT CHANGE UP (ref 80–100)
MONOCYTES # BLD AUTO: 0.54 K/UL — SIGNIFICANT CHANGE UP (ref 0–0.9)
MONOCYTES NFR BLD AUTO: 8.9 % — SIGNIFICANT CHANGE UP (ref 2–14)
NEUTROPHILS # BLD AUTO: 3.77 K/UL — SIGNIFICANT CHANGE UP (ref 1.8–7.4)
NEUTROPHILS NFR BLD AUTO: 61.7 % — SIGNIFICANT CHANGE UP (ref 43–77)
NITRITE UR-MCNC: NEGATIVE — SIGNIFICANT CHANGE UP
NRBC # BLD: 0 /100 WBCS — SIGNIFICANT CHANGE UP
NRBC # FLD: 0 K/UL — SIGNIFICANT CHANGE UP
PH UR: 6.5 — SIGNIFICANT CHANGE UP (ref 5–8)
PLATELET # BLD AUTO: 192 K/UL — SIGNIFICANT CHANGE UP (ref 150–400)
POTASSIUM SERPL-MCNC: 4.2 MMOL/L — SIGNIFICANT CHANGE UP (ref 3.5–5.3)
POTASSIUM SERPL-SCNC: 4.2 MMOL/L — SIGNIFICANT CHANGE UP (ref 3.5–5.3)
PROT SERPL-MCNC: 7.6 G/DL — SIGNIFICANT CHANGE UP (ref 6–8.3)
PROT UR-MCNC: NEGATIVE — SIGNIFICANT CHANGE UP
RBC # BLD: 4.32 M/UL — SIGNIFICANT CHANGE UP (ref 3.8–5.2)
RBC # FLD: 13.1 % — SIGNIFICANT CHANGE UP (ref 10.3–14.5)
SODIUM SERPL-SCNC: 141 MMOL/L — SIGNIFICANT CHANGE UP (ref 135–145)
SP GR SPEC: 1.01 — SIGNIFICANT CHANGE UP (ref 1–1.05)
T3 SERPL-MCNC: 80 NG/DL — SIGNIFICANT CHANGE UP (ref 80–200)
T4 AB SER-ACNC: 8.08 UG/DL — SIGNIFICANT CHANGE UP (ref 5.1–13)
TROPONIN T, HIGH SENSITIVITY RESULT: <6 NG/L — SIGNIFICANT CHANGE UP
UROBILINOGEN FLD QL: SIGNIFICANT CHANGE UP
WBC # BLD: 6.1 K/UL — SIGNIFICANT CHANGE UP (ref 3.8–10.5)
WBC # FLD AUTO: 6.1 K/UL — SIGNIFICANT CHANGE UP (ref 3.8–10.5)

## 2022-02-06 PROCEDURE — 93010 ELECTROCARDIOGRAM REPORT: CPT

## 2022-02-06 PROCEDURE — 99285 EMERGENCY DEPT VISIT HI MDM: CPT | Mod: 25

## 2022-02-06 RX ORDER — SODIUM CHLORIDE 9 MG/ML
1000 INJECTION INTRAMUSCULAR; INTRAVENOUS; SUBCUTANEOUS ONCE
Refills: 0 | Status: COMPLETED | OUTPATIENT
Start: 2022-02-06 | End: 2022-02-06

## 2022-02-06 RX ADMIN — SODIUM CHLORIDE 1000 MILLILITER(S): 9 INJECTION INTRAMUSCULAR; INTRAVENOUS; SUBCUTANEOUS at 14:16

## 2022-02-06 NOTE — ED PROVIDER NOTE - CLINICAL SUMMARY MEDICAL DECISION MAKING FREE TEXT BOX
62 y/o F with episodic lightheadedness and generalized weakness; currently asymptomatic per pt  -cbc, cmp, trop, ekg, ua, ivf; reassess

## 2022-02-06 NOTE — ED PROVIDER NOTE - NSPTACCESSSVCSAPPTDETAILS_ED_ALL_ED_FT
Please establish endocrine f/u in 5-7 days as well as cardiology f/u in 5-7 days for lightheadedness

## 2022-02-06 NOTE — ED PROVIDER NOTE - PATIENT PORTAL LINK FT
You can access the FollowMyHealth Patient Portal offered by [facility] by registering at the following website: [email or web address]. By joining [employer]’s FollowMyHealth portal, you will also be able to view your health information using other applications (apps) compatible with our system.

## 2022-02-06 NOTE — ED PROVIDER NOTE - NSFOLLOWUPINSTRUCTIONS_ED_ALL_ED_FT
Rest, stay hydrated, take all meds as previously prescribed, Followup with your PMD within 2 days for post hospital visit. Recommend follow up with Endocrinologist regarding hypothyroidism as well as cardiologist regarding lightheadedness. Show your doctor and specialists all copies of labs given to you. Return for worsening symptoms, ex. fever, shortness of breath, chest pain, dizziness, palpitations, etc. Please read all the patient handouts.

## 2022-02-06 NOTE — ED PROVIDER NOTE - OBJECTIVE STATEMENT
62 y/o F PMH seizure d/o c/o episodes of lightheadedness and feeling generalized weakness especially first thing in the morning when she gets out of bed. Pt has had multiple ED visits for same sxs. Sxs began in December when pt had COVID and have been persistent ever since. Pt also has followed up with a neurologist and had MRI head and spine which were unremarkable per pt. Pt has not followed up with caridoogist for her sxs. Denies fever, chills, change in vision, abdominal pain, N/V, CP, SOB, frequent falls.

## 2022-02-06 NOTE — ED ADULT NURSE NOTE - OBJECTIVE STATEMENT
Pt awake and alert x 4 finger stick 93. pt with multiple co, co weakness x few days , dizziness , and loose  stool x few weeks. Pt denies abdominal pain. Pt was seen in ER 2 days ago for same co. Pt with no nausea or vomiting asking to eat. iv placed blood collected, pt awaiting md falcon.

## 2022-02-06 NOTE — ED ADULT NURSE NOTE - CHIEF COMPLAINT QUOTE
p/t c/o of generalized weakness for few days, seen in ED 2 days ago for same issue, no neuro deficits noted

## 2022-02-06 NOTE — ED PROVIDER NOTE - ATTENDING CONTRIBUTION TO CARE
Dr. Mittal: 62 yo female with PMH seizure disorder on Keppra, in ED with intermittent episodes of lightheadedness and extremity tingling (both feet) associated with "cold feeling".  Episodes worse in the morning.  Has been seen in ED few times for this without a definitive diagnosis.  Has also been seen by neurologist a few times, and had reportedly normal MRI head and spine within the last week.  Symptoms possibly began after pt had Covid 2.5 months ago, but unclear.  Pt continues to have symptoms and so came to ED.  On exam pt chronically-ill appearing but in NAD, heart RRR, lungs CTAB, abd NTND, extremities without swelling, strength 5/5 in all extremities and skin without rash.  Normal finger to nose bilaterally.  EOMI.  Gait slow but normal in ED.  Incidentally noted that pt hypothyroid on recent labs.  Pt informed of this, may partially account for pt's symptoms.  Low suspicion for acute findings on today's ED visit, but will re-check labs and hydrate and re-eval.  Discussed with pt and daughter need for continued outpatient follow up--consider return to PMD and neurology, as well as possible endocrine for thyroid.

## 2022-04-07 NOTE — ED ADULT TRIAGE NOTE - NSTRIAGECARE_GEN_A_ER
Face Mask/EKG Billing Type: Third-Party Bill Bill For Surgical Tray: no Expected Date Of Service: 04/07/2022

## 2023-03-27 NOTE — ED ADULT NURSE NOTE - CCCP TRG CHIEF CMPLNT
Detail Level: Detailed
Patient Specific Counseling (Will Not Stick From Patient To Patient): - recommended OTC Carpe for maintenance at night
laceration

## 2023-04-14 NOTE — ED PROVIDER NOTE - CLINICAL SUMMARY MEDICAL DECISION MAKING FREE TEXT BOX
this is a 61 y.o female with a PMhx of seizures on keppra hasn't had a seizure in 5 years. , and HTN presented to the ED complaining of having a episode of dizziness after she had diarrhea earlier today. dizziness-labs, fluids, medications reassess
head ache/complains of pain/discomfort

## 2023-06-12 ENCOUNTER — INPATIENT (INPATIENT)
Facility: HOSPITAL | Age: 63
LOS: 7 days | Discharge: HOME HEALTH | End: 2023-06-20
Attending: STUDENT IN AN ORGANIZED HEALTH CARE EDUCATION/TRAINING PROGRAM | Admitting: STUDENT IN AN ORGANIZED HEALTH CARE EDUCATION/TRAINING PROGRAM
Payer: MEDICAID

## 2023-06-12 VITALS
OXYGEN SATURATION: 100 % | TEMPERATURE: 98 F | HEART RATE: 110 BPM | RESPIRATION RATE: 16 BRPM | SYSTOLIC BLOOD PRESSURE: 117 MMHG | DIASTOLIC BLOOD PRESSURE: 87 MMHG

## 2023-06-12 PROCEDURE — 99285 EMERGENCY DEPT VISIT HI MDM: CPT

## 2023-06-12 PROCEDURE — 93010 ELECTROCARDIOGRAM REPORT: CPT

## 2023-06-12 RX ORDER — SODIUM CHLORIDE 9 MG/ML
1000 INJECTION INTRAMUSCULAR; INTRAVENOUS; SUBCUTANEOUS ONCE
Refills: 0 | Status: COMPLETED | OUTPATIENT
Start: 2023-06-12 | End: 2023-06-12

## 2023-06-12 RX ORDER — ONDANSETRON 8 MG/1
4 TABLET, FILM COATED ORAL ONCE
Refills: 0 | Status: COMPLETED | OUTPATIENT
Start: 2023-06-12 | End: 2023-06-12

## 2023-06-12 RX ORDER — LEVETIRACETAM 250 MG/1
2000 TABLET, FILM COATED ORAL EVERY 12 HOURS
Refills: 0 | Status: DISCONTINUED | OUTPATIENT
Start: 2023-06-12 | End: 2023-06-13

## 2023-06-12 RX ADMIN — LEVETIRACETAM 600 MILLIGRAM(S): 250 TABLET, FILM COATED ORAL at 23:43

## 2023-06-12 RX ADMIN — SODIUM CHLORIDE 1000 MILLILITER(S): 9 INJECTION INTRAMUSCULAR; INTRAVENOUS; SUBCUTANEOUS at 23:42

## 2023-06-12 RX ADMIN — Medication 1 MILLIGRAM(S): at 23:10

## 2023-06-12 RX ADMIN — ONDANSETRON 4 MILLIGRAM(S): 8 TABLET, FILM COATED ORAL at 23:53

## 2023-06-12 NOTE — ED ADULT NURSE NOTE - NSFALLHARMRISKINTERV_ED_ALL_ED

## 2023-06-12 NOTE — ED PROVIDER NOTE - OBJECTIVE STATEMENT
63-year-old female past medical history of epilepsy on Keppra presents with seizure.  Patient presented to ED with adult daughter feeling like she was going to have a seizure.  Patient with witnessed seizure general tonic-clonic in waiting area without fall or head strike.  Patient was wheeled back to trauma A in stretcher actively seizing with spontaneous resolution after approximately 60 seconds.  Patient with observed post ictal period, somnolent but protecting airway, no hypoxemia.  There is no reported fever, chills, nausea, vomiting, dysuria, diarrhea.  Per daughter at bedside the patient's last seizure was approximately 4 years ago.  No history of alcohol or illicit drug use.

## 2023-06-12 NOTE — ED PROVIDER NOTE - PHYSICAL EXAMINATION
GEN:  Somnolent, responsive to verbal stimuli, protecting airway, moving all extremities  HEENT:  NCAT, neck supple, EOMI, PERRLA, sclera anicteric, no conjunctival pallor or injection, no stridor, normal voice, no tonsillar exudate, uvula midline  CV:  regular rhythm and rate, s1/s2 audible, no murmurs, rubs or gallops, peripheral pulses 2+ and symmetric  PULM:  non-labored respirations, lungs clear to auscultation bilaterally, no wheezes, crackles or rales  ABD:  non distended, non-tender, no rebound, no guarding, negative Connor's sign, bowel sounds normal, no cvat  MSK:  no gross deformity, non-tender extremities and joints, range of motion grossly normal appearing, no extremity edema, extremities warm and well perfused   NEURO:  Somnolent, moving all extremities, nontremulous, no cranial nerve deficits  SKIN:  warm, dry, no rash or vesicles

## 2023-06-12 NOTE — ED ADULT TRIAGE NOTE - TEMPERATURE IN FAHRENHEIT (DEGREES F)
Talk to pt daughter she stated that since pt went had surgery for pacemaker dr brooke change pt januvia to 25 mg instead of 50mg pt daughter is worried because pt skin as dark since the medication has been change. Pt would like to talk dr coats since she is on call this week.    98

## 2023-06-12 NOTE — ED PROVIDER NOTE - SHIFT CHANGE DETAILS
SAMUEL:  Patient signed out to Dr. Morgan pending labs, head ct, neuro consult.  HD stable at time of signout.

## 2023-06-12 NOTE — ED PROVIDER NOTE - CLINICAL SUMMARY MEDICAL DECISION MAKING FREE TEXT BOX
63-year-old female past medical history of epilepsy on Keppra presents with seizure.  Patient with self-limited seizure in ED without recurrence.  Vital signs stable, afebrile, fingerstick glucose within normal limits.  Appears postictal currently.  Suspect breakthrough epileptic seizure of unclear etiology.  No apparent trauma or infectious etiology.  Patient given Keppra load, will follow labs head CT, possible neuro consult.  Disposition pending.

## 2023-06-12 NOTE — ED ADULT NURSE NOTE - LANGUAGE ASSISTANCE NEEDED
Yes-Patient/Caregiver accepts free interpretation services... daughter-stefanie kuo/Yes-Patient/Caregiver accepts free interpretation services...

## 2023-06-12 NOTE — ED ADULT NURSE NOTE - NSFALLUNIVINTERV_ED_ALL_ED
Bed/Stretcher in lowest position, wheels locked, appropriate side rails in place/Call bell, personal items and telephone in reach/Instruct patient to call for assistance before getting out of bed/chair/stretcher/Non-slip footwear applied when patient is off stretcher/[place] to call system/Physically safe environment - no spills, clutter or unnecessary equipment/Purposeful proactive rounding/Room/bathroom lighting operational, light cord in reach

## 2023-06-12 NOTE — ED ADULT NURSE NOTE - CHIEF COMPLAINT QUOTE
Pt c/o weakness since last night. Endorses nausea and twitching of hands and eyes. " Pt felt like she was going to have a seizure all day. Denies fever, chills, diarrhea, urinary symptoms, drug/ alchocol use, chest pain, headache .  Past Medical History: Epilepsy, HTN

## 2023-06-12 NOTE — ED ADULT TRIAGE NOTE - CHIEF COMPLAINT QUOTE
Pt c/o weakness since last night. Endorses nausea and twitching of hands and eyes. " Pt felt like she was going to have a seizure all day. Denies fever, chills, diarrhea, urinary symptoms, drug/ alchocol use, chest pain. .  Past Medical History: Epilepsy, HTN Pt c/o weakness since last night. Endorses nausea and twitching of hands and eyes. " Pt felt like she was going to have a seizure all day. Denies fever, chills, diarrhea, urinary symptoms, drug/ alchocol use, chest pain, headache .  Past Medical History: Epilepsy, HTN

## 2023-06-12 NOTE — ED ADULT NURSE NOTE - OBJECTIVE STATEMENT
as per triage nurse, "Pt c/o weakness since last night. Endorses nausea and twitching of hands and eyes. " Pt felt like she was going to have a seizure all day. Denies fever, chills, diarrhea, urinary symptoms, drug/ alchocol use, chest pain, headache .  Past Medical History: Epilepsy, HTN"

## 2023-06-13 ENCOUNTER — TRANSCRIPTION ENCOUNTER (OUTPATIENT)
Age: 63
End: 2023-06-13

## 2023-06-13 DIAGNOSIS — R56.9 UNSPECIFIED CONVULSIONS: ICD-10-CM

## 2023-06-13 DIAGNOSIS — Z90.49 ACQUIRED ABSENCE OF OTHER SPECIFIED PARTS OF DIGESTIVE TRACT: Chronic | ICD-10-CM

## 2023-06-13 DIAGNOSIS — Z29.9 ENCOUNTER FOR PROPHYLACTIC MEASURES, UNSPECIFIED: ICD-10-CM

## 2023-06-13 DIAGNOSIS — I10 ESSENTIAL (PRIMARY) HYPERTENSION: ICD-10-CM

## 2023-06-13 LAB
ALBUMIN SERPL ELPH-MCNC: 4.6 G/DL — SIGNIFICANT CHANGE UP (ref 3.3–5)
ALP SERPL-CCNC: 138 U/L — HIGH (ref 40–120)
ALT FLD-CCNC: 21 U/L — SIGNIFICANT CHANGE UP (ref 4–33)
ANION GAP SERPL CALC-SCNC: 25 MMOL/L — HIGH (ref 7–14)
APPEARANCE UR: CLEAR — SIGNIFICANT CHANGE UP
AST SERPL-CCNC: 22 U/L — SIGNIFICANT CHANGE UP (ref 4–32)
BASE EXCESS BLDV CALC-SCNC: -14 MMOL/L — LOW (ref -2–3)
BASE EXCESS BLDV CALC-SCNC: -3 MMOL/L — LOW (ref -2–3)
BASOPHILS # BLD AUTO: 0.07 K/UL — SIGNIFICANT CHANGE UP (ref 0–0.2)
BASOPHILS NFR BLD AUTO: 0.5 % — SIGNIFICANT CHANGE UP (ref 0–2)
BILIRUB SERPL-MCNC: 0.2 MG/DL — SIGNIFICANT CHANGE UP (ref 0.2–1.2)
BILIRUB UR-MCNC: NEGATIVE — SIGNIFICANT CHANGE UP
BLOOD GAS VENOUS COMPREHENSIVE RESULT: SIGNIFICANT CHANGE UP
BLOOD GAS VENOUS COMPREHENSIVE RESULT: SIGNIFICANT CHANGE UP
BUN SERPL-MCNC: 20 MG/DL — SIGNIFICANT CHANGE UP (ref 7–23)
CALCIUM SERPL-MCNC: 9.5 MG/DL — SIGNIFICANT CHANGE UP (ref 8.4–10.5)
CHLORIDE BLDV-SCNC: 104 MMOL/L — SIGNIFICANT CHANGE UP (ref 96–108)
CHLORIDE BLDV-SCNC: 108 MMOL/L — SIGNIFICANT CHANGE UP (ref 96–108)
CHLORIDE SERPL-SCNC: 104 MMOL/L — SIGNIFICANT CHANGE UP (ref 98–107)
CO2 BLDV-SCNC: 19.9 MMOL/L — LOW (ref 22–26)
CO2 BLDV-SCNC: 24 MMOL/L — SIGNIFICANT CHANGE UP (ref 22–26)
CO2 SERPL-SCNC: 14 MMOL/L — LOW (ref 22–31)
COLOR SPEC: COLORLESS — SIGNIFICANT CHANGE UP
CREAT SERPL-MCNC: 0.99 MG/DL — SIGNIFICANT CHANGE UP (ref 0.5–1.3)
DIFF PNL FLD: NEGATIVE — SIGNIFICANT CHANGE UP
EGFR: 64 ML/MIN/1.73M2 — SIGNIFICANT CHANGE UP
EOSINOPHIL # BLD AUTO: 0.06 K/UL — SIGNIFICANT CHANGE UP (ref 0–0.5)
EOSINOPHIL NFR BLD AUTO: 0.4 % — SIGNIFICANT CHANGE UP (ref 0–6)
GAS PNL BLDV: 139 MMOL/L — SIGNIFICANT CHANGE UP (ref 136–145)
GAS PNL BLDV: 142 MMOL/L — SIGNIFICANT CHANGE UP (ref 136–145)
GAS PNL BLDV: SIGNIFICANT CHANGE UP
GLUCOSE BLDV-MCNC: 111 MG/DL — HIGH (ref 70–99)
GLUCOSE BLDV-MCNC: 140 MG/DL — HIGH (ref 70–99)
GLUCOSE SERPL-MCNC: 135 MG/DL — HIGH (ref 70–99)
GLUCOSE UR QL: NEGATIVE — SIGNIFICANT CHANGE UP
HCO3 BLDV-SCNC: 18 MMOL/L — LOW (ref 22–29)
HCO3 BLDV-SCNC: 23 MMOL/L — SIGNIFICANT CHANGE UP (ref 22–29)
HCT VFR BLD CALC: 41.3 % — SIGNIFICANT CHANGE UP (ref 34.5–45)
HCT VFR BLDA CALC: 35 % — SIGNIFICANT CHANGE UP (ref 34.5–46.5)
HCT VFR BLDA CALC: 40 % — SIGNIFICANT CHANGE UP (ref 34.5–46.5)
HGB BLD CALC-MCNC: 11.6 G/DL — LOW (ref 11.7–16.1)
HGB BLD CALC-MCNC: 13.3 G/DL — SIGNIFICANT CHANGE UP (ref 11.7–16.1)
HGB BLD-MCNC: 13 G/DL — SIGNIFICANT CHANGE UP (ref 11.5–15.5)
IANC: 9.42 K/UL — HIGH (ref 1.8–7.4)
IMM GRANULOCYTES NFR BLD AUTO: 0.7 % — SIGNIFICANT CHANGE UP (ref 0–0.9)
KETONES UR-MCNC: NEGATIVE — SIGNIFICANT CHANGE UP
LACTATE BLDV-MCNC: 1.6 MMOL/L — SIGNIFICANT CHANGE UP (ref 0.5–2)
LACTATE BLDV-MCNC: 12.8 MMOL/L — CRITICAL HIGH (ref 0.5–2)
LEUKOCYTE ESTERASE UR-ACNC: NEGATIVE — SIGNIFICANT CHANGE UP
LYMPHOCYTES # BLD AUTO: 26.7 % — SIGNIFICANT CHANGE UP (ref 13–44)
LYMPHOCYTES # BLD AUTO: 3.95 K/UL — HIGH (ref 1–3.3)
MAGNESIUM SERPL-MCNC: 2.3 MG/DL — SIGNIFICANT CHANGE UP (ref 1.6–2.6)
MCHC RBC-ENTMCNC: 28.4 PG — SIGNIFICANT CHANGE UP (ref 27–34)
MCHC RBC-ENTMCNC: 31.5 GM/DL — LOW (ref 32–36)
MCV RBC AUTO: 90.2 FL — SIGNIFICANT CHANGE UP (ref 80–100)
MONOCYTES # BLD AUTO: 1.17 K/UL — HIGH (ref 0–0.9)
MONOCYTES NFR BLD AUTO: 7.9 % — SIGNIFICANT CHANGE UP (ref 2–14)
NEUTROPHILS # BLD AUTO: 9.42 K/UL — HIGH (ref 1.8–7.4)
NEUTROPHILS NFR BLD AUTO: 63.8 % — SIGNIFICANT CHANGE UP (ref 43–77)
NITRITE UR-MCNC: NEGATIVE — SIGNIFICANT CHANGE UP
NRBC # BLD: 0 /100 WBCS — SIGNIFICANT CHANGE UP (ref 0–0)
NRBC # FLD: 0 K/UL — SIGNIFICANT CHANGE UP (ref 0–0)
PCO2 BLDV: 42 MMHG — SIGNIFICANT CHANGE UP (ref 39–52)
PCO2 BLDV: 69 MMHG — HIGH (ref 39–52)
PH BLDV: 7.02 — LOW (ref 7.32–7.43)
PH BLDV: 7.34 — SIGNIFICANT CHANGE UP (ref 7.32–7.43)
PH UR: 6.5 — SIGNIFICANT CHANGE UP (ref 5–8)
PHOSPHATE SERPL-MCNC: 3.6 MG/DL — SIGNIFICANT CHANGE UP (ref 2.5–4.5)
PLATELET # BLD AUTO: 208 K/UL — SIGNIFICANT CHANGE UP (ref 150–400)
PO2 BLDV: 56 MMHG — HIGH (ref 25–45)
PO2 BLDV: 65 MMHG — HIGH (ref 25–45)
POTASSIUM BLDV-SCNC: 3.3 MMOL/L — LOW (ref 3.5–5.1)
POTASSIUM BLDV-SCNC: 3.9 MMOL/L — SIGNIFICANT CHANGE UP (ref 3.5–5.1)
POTASSIUM SERPL-MCNC: 3.4 MMOL/L — LOW (ref 3.5–5.3)
POTASSIUM SERPL-SCNC: 3.4 MMOL/L — LOW (ref 3.5–5.3)
PROLACTIN SERPL-MCNC: 39.6 NG/ML — HIGH (ref 3.4–24.1)
PROT SERPL-MCNC: 8 G/DL — SIGNIFICANT CHANGE UP (ref 6–8.3)
PROT UR-MCNC: ABNORMAL
RBC # BLD: 4.58 M/UL — SIGNIFICANT CHANGE UP (ref 3.8–5.2)
RBC # FLD: 13 % — SIGNIFICANT CHANGE UP (ref 10.3–14.5)
SAO2 % BLDV: 80.9 % — SIGNIFICANT CHANGE UP (ref 67–88)
SAO2 % BLDV: 87.7 % — SIGNIFICANT CHANGE UP (ref 67–88)
SODIUM SERPL-SCNC: 143 MMOL/L — SIGNIFICANT CHANGE UP (ref 135–145)
SP GR SPEC: 1.01 — SIGNIFICANT CHANGE UP (ref 1.01–1.05)
UROBILINOGEN FLD QL: SIGNIFICANT CHANGE UP
WBC # BLD: 14.78 K/UL — HIGH (ref 3.8–10.5)
WBC # FLD AUTO: 14.78 K/UL — HIGH (ref 3.8–10.5)

## 2023-06-13 PROCEDURE — 70450 CT HEAD/BRAIN W/O DYE: CPT | Mod: 26,MD

## 2023-06-13 PROCEDURE — 95720 EEG PHY/QHP EA INCR W/VEEG: CPT

## 2023-06-13 PROCEDURE — 71045 X-RAY EXAM CHEST 1 VIEW: CPT | Mod: 26

## 2023-06-13 PROCEDURE — 99223 1ST HOSP IP/OBS HIGH 75: CPT

## 2023-06-13 PROCEDURE — 99223 1ST HOSP IP/OBS HIGH 75: CPT | Mod: GC

## 2023-06-13 RX ORDER — LEVETIRACETAM 250 MG/1
750 TABLET, FILM COATED ORAL
Refills: 0 | Status: DISCONTINUED | OUTPATIENT
Start: 2023-06-13 | End: 2023-06-13

## 2023-06-13 RX ORDER — ACETAMINOPHEN 500 MG
650 TABLET ORAL EVERY 6 HOURS
Refills: 0 | Status: DISCONTINUED | OUTPATIENT
Start: 2023-06-13 | End: 2023-06-20

## 2023-06-13 RX ORDER — ONDANSETRON 8 MG/1
4 TABLET, FILM COATED ORAL EVERY 8 HOURS
Refills: 0 | Status: DISCONTINUED | OUTPATIENT
Start: 2023-06-13 | End: 2023-06-20

## 2023-06-13 RX ORDER — LOSARTAN POTASSIUM 100 MG/1
25 TABLET, FILM COATED ORAL DAILY
Refills: 0 | Status: DISCONTINUED | OUTPATIENT
Start: 2023-06-13 | End: 2023-06-16

## 2023-06-13 RX ORDER — LAMOTRIGINE 25 MG/1
25 TABLET, ORALLY DISINTEGRATING ORAL
Refills: 0 | Status: DISCONTINUED | OUTPATIENT
Start: 2023-06-13 | End: 2023-06-20

## 2023-06-13 RX ORDER — LEVETIRACETAM 250 MG/1
750 TABLET, FILM COATED ORAL ONCE
Refills: 0 | Status: COMPLETED | OUTPATIENT
Start: 2023-06-13 | End: 2023-06-13

## 2023-06-13 RX ORDER — LAMOTRIGINE 25 MG/1
25 TABLET, ORALLY DISINTEGRATING ORAL DAILY
Refills: 0 | Status: DISCONTINUED | OUTPATIENT
Start: 2023-06-13 | End: 2023-06-13

## 2023-06-13 RX ORDER — LANOLIN ALCOHOL/MO/W.PET/CERES
3 CREAM (GRAM) TOPICAL AT BEDTIME
Refills: 0 | Status: DISCONTINUED | OUTPATIENT
Start: 2023-06-13 | End: 2023-06-20

## 2023-06-13 RX ORDER — ENOXAPARIN SODIUM 100 MG/ML
40 INJECTION SUBCUTANEOUS EVERY 24 HOURS
Refills: 0 | Status: DISCONTINUED | OUTPATIENT
Start: 2023-06-13 | End: 2023-06-20

## 2023-06-13 RX ORDER — LEVETIRACETAM 250 MG/1
1500 TABLET, FILM COATED ORAL EVERY 12 HOURS
Refills: 0 | Status: DISCONTINUED | OUTPATIENT
Start: 2023-06-14 | End: 2023-06-20

## 2023-06-13 RX ADMIN — LEVETIRACETAM 600 MILLIGRAM(S): 250 TABLET, FILM COATED ORAL at 06:10

## 2023-06-13 RX ADMIN — LEVETIRACETAM 400 MILLIGRAM(S): 250 TABLET, FILM COATED ORAL at 23:34

## 2023-06-13 RX ADMIN — LAMOTRIGINE 25 MILLIGRAM(S): 25 TABLET, ORALLY DISINTEGRATING ORAL at 18:42

## 2023-06-13 RX ADMIN — ENOXAPARIN SODIUM 40 MILLIGRAM(S): 100 INJECTION SUBCUTANEOUS at 18:42

## 2023-06-13 RX ADMIN — LEVETIRACETAM 750 MILLIGRAM(S): 250 TABLET, FILM COATED ORAL at 18:42

## 2023-06-13 NOTE — CONSULT NOTE ADULT - SUBJECTIVE AND OBJECTIVE BOX
Neurology - Consult Note    -  Spectra: [phone] ([facility]), [phone] ([facility])  -    HPI: Patient SUSHMA MAINI is a 63y (1960) woman, Sky speaking, PMH epilepsy on Keppra, who presents to [facility] Ed for seizure. Patient was feeling as though she was about to have a seizure all day. Daughter brought her to hospital. Patient had 60 second GTC in triage. She had a post ictal period. Last seizure was 4 years ago per family.     . /87. RR 16. Temp 98F. O2 100% on RA  WBC 14.78. Hgb 13.0. MCV 90.2. RDW 13.0. . Neutrophils 9.42.   Na 143. K 3.4. AG 25. BUN 20. Cr 0.99. egfr 64. Glu 135. Mg 2.3. Ph 3.6. UA neg.      Review of Systems:  INCOMPLETE   CONSTITUTIONAL: No fevers or chills  EYES AND ENT: No visual changes or no throat pain   NECK: No pain or stiffness  RESPIRATORY: No hemoptysis or shortness of breath  CARDIOVASCULAR: No chest pain or palpitations  GASTROINTESTINAL: No melena or hematochezia  GENITOURINARY: No dysuria or hematuria  NEUROLOGICAL: +As stated in HPI above  SKIN: No itching, burning, rashes, or lesions   All other review of systems is negative unless indicated above.    Allergies:  penicillin (Unknown)      PMHx/PSHx/Family Hx: As above, otherwise see below   Seizure        Social Hx:  No current use of tobacco, alcohol, or illicit drugs  Lives with ***    Medications:  MEDICATIONS  (STANDING):  levETIRAcetam  IVPB 2000 milliGRAM(s) IV Intermittent every 12 hours    MEDICATIONS  (PRN):      Vitals:  T(C): 36.7 (06-12-23 @ 21:56), Max: 36.7 (06-12-23 @ 21:56)  HR: 110 (06-12-23 @ 21:56) (110 - 110)  BP: 117/87 (06-12-23 @ 21:56) (117/87 - 117/87)  RR: 16 (06-12-23 @ 21:56) (16 - 16)  SpO2: 100% (06-12-23 @ 21:56) (100% - 100%)    Physical Examination: INCOMPLETE  General - NAD  Cardiovascular - Peripheral pulses palpable, no edema  Eyes - Fundoscopy with flat, sharp optic discs and no hemorrhage or exudates; Fundoscopy not well visualized; Fundoscopy not performed due to safety precautions in the setting of the COVID-19 pandemic    Neurologic Exam:  Mental status - Awake, Alert, Oriented to person, place, and time. Speech fluent, repetition and naming intact. Follows simple and complex commands. Attention/concentration, recent and remote memory (including registration and recall), and fund of knowledge intact    Cranial nerves - PERRLA, VFF, EOMI, face sensation (V1-V3) intact b/l, facial strength intact without asymmetry b/l, hearing intact b/l, palate with symmetric elevation, trapezius OR sternocleidomastiod 5/5 strength b/l, tongue midline on protrusion with full lateral movement    Motor - Normal bulk and tone throughout. No pronator drift.  Strength testing            Deltoid      Biceps      Triceps     Wrist Extension    Wrist Flexion     Interossei         R            5                 5               5                     5                              5                        5                 5  L             5                 5               5                     5                              5                        5                 5              Hip Flexion    Hip Extension    Knee Flexion    Knee Extension    Dorsiflexion    Plantar Flexion  R              5                           5                       5                           5                            5                          5  L              5                           5                        5                           5                            5                          5    Sensation - Light touch/temperature OR pain/vibration intact throughout    DTR's -             Biceps      Triceps     Brachioradialis      Patellar    Ankle    Toes/plantar response  R             2+             2+                  2+                       2+            2+                 Down  L              2+             2+                 2+                        2+           2+                 Down    Coordination - Finger to Nose intact b/l. No tremors appreciated    Gait and station - Normal casual gait. Romberg (-)    Labs:                        13.0   14.78 )-----------( 208      ( 12 Jun 2023 23:19 )             41.3     06-12    143  |  104  |  20  ----------------------------<  135<H>  3.4<L>   |  14<L>  |  0.99    Ca    9.5      12 Jun 2023 23:19  Phos  3.6     06-12  Mg     2.30     06-12    TPro  8.0  /  Alb  4.6  /  TBili  0.2  /  DBili  x   /  AST  22  /  ALT  21  /  AlkPhos  138<H>  06-12    CAPILLARY BLOOD GLUCOSE      POCT Blood Glucose.: 135 mg/dL (12 Jun 2023 23:09)    LIVER FUNCTIONS - ( 12 Jun 2023 23:19 )  Alb: 4.6 g/dL / Pro: 8.0 g/dL / ALK PHOS: 138 U/L / ALT: 21 U/L / AST: 22 U/L / GGT: x               CSF:                  Radiology:  CT Head No Cont:  (13 Jun 2023 01:23)            ACC: [id number] EXAM: CT BRAIN ORDERED BY: KATELIN BALLARD    PROCEDURE DATE: 06/13/2023        INTERPRETATION: INDICATION: Seizures  TECHNIQUE: Multiple axial CT images were performed through the head without IV contrast using standard protocol. Coronal and sagittal images are reformatted from the axial data.    COMPARISON EXAMINATION: CT head 10/18/2021    FINDINGS:    CT HEAD:    HEMISPHERES: No mass or space occupying lesion. No brain edema or hemorrhagic foci are suggested. Small vessel white matter ischemic changes are noted.  VENTRICLES: No hydrocephalus. No midline shift.  POSTERIOR FOSSA: The brain stem and cerebellum are unremarkable.  EXTRACEREBRAL SPACES: No subdural or epidural collections are noted.  SKULL BASE AND CALVARIUM: Intact.  SINUSES AND MASTOIDS: Mild patchy mucosal thickening of the right maxillary sinus. The mastoid air cells are clear.  MISCELLANEOUS: No orbital or suprasellar abnormality noted.      IMPRESSION:    No hydrocephalus, mass effect, midline shift, acute intracranial hemorrhage, or brain edema.    --- End of Report ---          FRANTZ FREEDMAN MD; Resident Radiologist  This document has been electronically signed.  KATE EDWARDS MD; Attending Radiologist  This document has been electronically signed. Jun 13 2023 2:44AM     Neurology - Consult Note    -  Spectra: [phone] ([facility]), [phone] ([facility])  -    HPI: Patient SUSHMA MAINI is a 63y (1960) woman, Sky speaking, PMH HTN on losartan, epilepsy on Keppra, who presents to [facility] ED for seizure. Patient was feeling as though she was about to have a seizure all day. Daughter brought her to hospital. Patient had 60 second GTC in triage. She had a post ictal period. Last seizure was 4 years ago per family.     Patient seen at bedside by neurology. Patient's daughter at bedside, report patient said she was feeling unwell all day. She had some complaints of abdominal pain yesterday, as well as complaints of headache and chills today. She took an extra 500mg Keppra in the morning today. At about 8PM at night, she called her children saying that she feels like she is dying and to come immediately. Daughter called the ambulance. Per daughter, patient was laying flat, feeling as though she was not quite able to move for a bit, complaining of sensation of numbness throughout her body. She came in to the ED, started to have jerking movements, but jerks were originally more spaced out. Her pupils dilated, eyes rolled back, and she started to have full body shaking, lasting about a minute or so. Patient had urinary incontinence and foaming from the mouth during event. She was confused for 15 minutes or so afterwards. Patient received 1mg ativan and 2g Keppra in the ED after her GTC resolved. Patient thinks her seizure may have been triggered by anxiety regarding her children's future. Her last seizure was reported to be in 2015. Prior seizure events described as feelings of seizure coming followed by shaking activity with urinary incontinence and followed by period of confusion afterwards. She sees Dr Horan, neurologist, [facility] in [place]. She was being weaned off of her Keppra due to decreased seizure frequency. Was currently taking 500mg Keppra every night. Per patient's family, she has had episodes recently where she wakes up at night with her bed wet, and feeling confused.     . /87. RR 16. Temp 98F. O2 100% on RA  WBC 14.78. Hgb 13.0. MCV 90.2. RDW 13.0. . Neutrophils 9.42.   Na 143. K 3.4. AG 25. BUN 20. Cr 0.99. egfr 64. Glu 135. Mg 2.3. Ph 3.6. UA neg.      Review of Systems:   CONSTITUTIONAL: No fevers, + chills  EYES AND ENT: No visual changes or no throat pain   NECK: No pain or stiffness  RESPIRATORY: No hemoptysis or shortness of breath  CARDIOVASCULAR: No chest pain or palpitations  GASTROINTESTINAL: No melena or hematochezia  GENITOURINARY: No dysuria or hematuria  NEUROLOGICAL: +As stated in HPI above  SKIN: No itching, burning, rashes, or lesions      Allergies:  penicillin (Unknown)      PMHx/PSHx/Family Hx: As above, otherwise see below   Seizure        Social Hx:  No current use of tobacco, alcohol, or illicit drugs       Medications:  MEDICATIONS  (STANDING):  levETIRAcetam  IVPB 2000 milliGRAM(s) IV Intermittent every 12 hours    MEDICATIONS  (PRN):      Vitals:  T(C): 36.7 (06-12-23 @ 21:56), Max: 36.7 (06-12-23 @ 21:56)  HR: 110 (06-12-23 @ 21:56) (110 - 110)  BP: 117/87 (06-12-23 @ 21:56) (117/87 - 117/87)  RR: 16 (06-12-23 @ 21:56) (16 - 16)  SpO2: 100% (06-12-23 @ 21:56) (100% - 100%)    Physical Examination:   General - NAD  Cardiovascular - Peripheral pulses palpable, no edema  Eyes - Fundoscopy not performed due to safety precautions in the setting of the COVID-19 pandemic    Neurologic Exam:  Mental status - Patient sleeping but rouses to verbal and tactile stimuli, Drowsy, Oriented to person, place, but not time. Speech fluent, repetition and naming intact. Follows simple commands.      Cranial nerves - Pupils round and reactive to light, asymmetric, VFF, EOMI, face sensation (V1-V3) intact b/l, facial strength intact without asymmetry b/l, hearing intact b/l, palate with symmetric elevation, trapezius 5/5 strength b/l, tongue midline on protrusion with full lateral movement    Motor - Normal bulk and tone throughout. No pronator drift. Testing limited by patient effort, but patient moving all extremities AG, at least 4+ and symmetric.    Sensation - Light touch  intact throughout    DTR's - Biceps, Br 2+, patellars not elicited due to patient positioning, toes downgoing b/l     Coordination - Finger to Nose intact b/l. No tremors appreciated    Gait and station - deferred for patient safety    Labs:                        13.0   14.78 )-----------( 208      ( 12 Jun 2023 23:19 )             41.3     06-12    143  |  104  |  20  ----------------------------<  135<H>  3.4<L>   |  14<L>  |  0.99    Ca    9.5      12 Jun 2023 23:19  Phos  3.6     06-12  Mg     2.30     06-12    TPro  8.0  /  Alb  4.6  /  TBili  0.2  /  DBili  x   /  AST  22  /  ALT  21  /  AlkPhos  138<H>  06-12    CAPILLARY BLOOD GLUCOSE      POCT Blood Glucose.: 135 mg/dL (12 Jun 2023 23:09)    LIVER FUNCTIONS - ( 12 Jun 2023 23:19 )  Alb: 4.6 g/dL / Pro: 8.0 g/dL / ALK PHOS: 138 U/L / ALT: 21 U/L / AST: 22 U/L / GGT: x               CSF:                  Radiology:  CT Head No Cont:  (13 Jun 2023 01:23)            ACC: [id number] EXAM: CT BRAIN ORDERED BY: KATELIN BALLARD    PROCEDURE DATE: 06/13/2023        INTERPRETATION: INDICATION: Seizures  TECHNIQUE: Multiple axial CT images were performed through the head without IV contrast using standard protocol. Coronal and sagittal images are reformatted from the axial data.    COMPARISON EXAMINATION: CT head 10/18/2021    FINDINGS:    CT HEAD:    HEMISPHERES: No mass or space occupying lesion. No brain edema or hemorrhagic foci are suggested. Small vessel white matter ischemic changes are noted.  VENTRICLES: No hydrocephalus. No midline shift.  POSTERIOR FOSSA: The brain stem and cerebellum are unremarkable.  EXTRACEREBRAL SPACES: No subdural or epidural collections are noted.  SKULL BASE AND CALVARIUM: Intact.  SINUSES AND MASTOIDS: Mild patchy mucosal thickening of the right maxillary sinus. The mastoid air cells are clear.  MISCELLANEOUS: No orbital or suprasellar abnormality noted.      IMPRESSION:    No hydrocephalus, mass effect, midline shift, acute intracranial hemorrhage, or brain edema.    --- End of Report ---          FRANTZ FREEDMAN MD; Resident Radiologist  This document has been electronically signed.  KATE EDWARDS MD; Attending Radiologist  This document has been electronically signed. Jun 13 2023 2:44AM

## 2023-06-13 NOTE — H&P ADULT - NSHPPHYSICALEXAM_GEN_ALL_CORE
T(C): 36.7 (06-13-23 @ 10:02), Max: 36.7 (06-12-23 @ 21:56)  HR: 79 (06-13-23 @ 10:02) (79 - 110)  BP: 119/64 (06-13-23 @ 10:02) (117/87 - 140/84)  RR: 18 (06-13-23 @ 10:02) (16 - 18)  SpO2: 100% (06-13-23 @ 10:02) (100% - 100%)    CONSTITUTIONAL: Tired appearing, able to converse appropriately    EYES: No conjunctival or scleral injection, non-icteric  ENMT: Oral mucosa with moist membranes. Normal dentition; no pharyngeal injection or exudates  RESP: No respiratory distress, no use of accessory muscles; CTA b/l, no WRR  CV: RRR, +S1S2, no MRG; no JVD; no peripheral edema  GI: Soft, NT, ND, no rebound, no guarding; no palpable masses; no hepatosplenomegaly; no hernia palpated  MSK: Normal ROM without pain, no spinal tenderness, normal muscle strength/tone  SKIN: No rashes or ulcers noted; no subcutaneous nodules or induration palpable  NEURO: CN II-XII intact; normal reflexes in upper and lower extremities, sensation intact in upper and lower extremities b/l to light touch   PSYCH: Appropriate insight/judgment; A+O x 3, mood and affect appropriate, recent/remote memory intact

## 2023-06-13 NOTE — DISCHARGE NOTE PROVIDER - ATTENDING DISCHARGE PHYSICAL EXAMINATION:
GENERAL: NAD  HEAD:  Atraumatic, Normocephalic  EYES: EOMI, conjunctiva and sclera clear  NECK: Supple  CHEST/LUNG: Clear to auscultation bilaterally; No wheeze, rhonchi, crackles or rales  HEART: Regular rate and rhythm; No murmurs, rubs, or gallops  ABDOMEN: Soft, Nontender, Nondistended; Bowel sounds present  EXTREMITIES:  2+ Peripheral Pulses, No edema  PSYCH: AAOx3  NEUROLOGY: non-focal  SKIN: Warm and dry

## 2023-06-13 NOTE — CONSULT NOTE ADULT - ASSESSMENT
Assessment: 63y (1960) woman, Sky speaking, PMH HTN on losartan, epilepsy on Keppra, who presents to [facility] ED for seizure.  Patient had 60 second GTC in triage. Patient seen at bedside by neurology. Patient's daughter at bedside, report patient said she was feeling unwell all day. She had some complaints of abdominal pain yesterday, as well as complaints of headache and chills today. She took an extra 500mg Keppra in the morning today. At about 8PM at night, she called her children saying that she feels like she is dying and to come immediately. Daughter called the ambulance. Per daughter, patient was laying flat, feeling as though she was not quite able to move for a bit, complaining of sensation of numbness throughout her body. She came in to the ED, started to have jerking movements, but jerks were originally more spaced out. Her pupils dilated, eyes rolled back, and she started to have full body shaking, lasting about a minute or so. Patient had urinary incontinence and foaming from the mouth during event. She was confused for 15 minutes or so afterwards. Patient received 1mg ativan and 2g Keppra in the ED after her GTC resolved. Patient thinks her seizure may have been triggered by anxiety regarding her children's future. Her last seizure was reported to be in 2015. Prior seizure events described as feelings of seizure coming followed by shaking activity with urinary incontinence and followed by period of confusion afterwards. She sees Dr Horan, neurologist, [facility] in [place]. She was being weaned off of her Keppra due to decreased seizure frequency. Was currently taking 500mg Keppra every night. Per patient's family, she has had episodes recently where she wakes up at night with her bed wet, and feeling confused.     . /87. RR 16. Temp 98F. O2 100% on RA  WBC 14.78. Hgb 13.0. MCV 90.2. RDW 13.0. . Neutrophils 9.42.   Na 143. K 3.4. AG 25. BUN 20. Cr 0.99. egfr 64. Glu 135. Mg 2.3. Ph 3.6. UA neg.    Summary:  Patient presents with tonic-clonic jerking, incontinence, loss of consciousness, preceded by feeling of doom. Seizure lasted about a minute. Patient does a have history of seizure and describes episodes as LOC with generalized shaking. Last seizure was a few years ago.  Patient is compliant with medications but is only on a 500mg once a day regimen  CT head unremarkable. Exam nonfocal.    Impression: Breakthrough seizures, possibly focal (given aura) with secondary generalization    Recommend:  [] Video EEG monitoring  [] Check AED levels, CBC, CMP, Mg, Phos, urinalysis, drug and toxicology screen   [] Seizure, fall and aspiration precautions. Avoid sleep deprivation.   [] IV lorazepam 1mg IV prn for seizure activity lasting >3-5mins  [] baseline EKG and CXR    [] If clinically possible, avoid Wellbutrin and Tramadol.  Carefully weigh risk/benefit ratio when considering the use of other drugs that are also known to lower seizure threshold such as SSRI's, Wellbutrin and Tramadol.       [] Given concern for seizure, advise the patient with regards to risks and driving privileges associated with the [place] Guidelines. Advise patient regarding the risk of seizures and general seizure safety recommendations including not to be bathing alone, climbing to high places and operating heavy machinery, until cleared by follow-up outpatient Neurology. Reinforce the importance of compliance with medications. Discuss sleep hygiene and the risks of sleep disruption. Discuss the risk of death associated with seizures / SUDEP.     [] Please note: if patient has a convulsion, please document length of episode, specifically what patient was doing paying attention to eye opening vs closure, gaze deviation, shaking of extremities, tongue bite, urinary incontinence, any derangement of vital signs.      * Case and plan not final until Attending attestation * Assessment: 63y (1960) woman, Sky speaking, PMH HTN on losartan, epilepsy on Keppra, who presents to [facility] ED for seizure.  Patient had 60 second GTC in triage. Patient seen at bedside by neurology. Patient's daughter at bedside, report patient said she was feeling unwell all day. She had some complaints of abdominal pain yesterday, as well as complaints of headache and chills today. She took an extra 500mg Keppra in the morning today. At about 8PM at night, she called her children saying that she feels like she is dying and to come immediately. Daughter called the ambulance. Per daughter, patient was laying flat, feeling as though she was not quite able to move for a bit, complaining of sensation of numbness throughout her body. She came in to the ED, started to have jerking movements, but jerks were originally more spaced out. Her pupils dilated, eyes rolled back, and she started to have full body shaking, lasting about a minute or so. Patient had urinary incontinence and foaming from the mouth during event. She was confused for 15 minutes or so afterwards. Patient received 1mg ativan and 2g Keppra in the ED after her GTC resolved. Patient thinks her seizure may have been triggered by anxiety regarding her children's future. Her last seizure was reported to be in 2015. Prior seizure events described as feelings of seizure coming followed by shaking activity with urinary incontinence and followed by period of confusion afterwards. She sees Dr Horan, neurologist, [facility] in [place]. She was being weaned off of her Keppra due to decreased seizure frequency. Was currently taking 500mg Keppra every night. Per patient's family, she has had episodes recently where she wakes up at night with her bed wet, and feeling confused.     . /87. RR 16. Temp 98F. O2 100% on RA  WBC 14.78. Hgb 13.0. MCV 90.2. RDW 13.0. . Neutrophils 9.42.   Na 143. K 3.4. AG 25. BUN 20. Cr 0.99. egfr 64. Glu 135. Mg 2.3. Ph 3.6. UA neg.    Summary:  Patient presents with tonic-clonic jerking, incontinence, loss of consciousness, preceded by feeling of doom. Seizure lasted about a minute. Patient does a have history of seizure and describes episodes as LOC with generalized shaking. Last seizure was a few years ago.  Patient is compliant with medications but is only on a 500mg once a day regimen  CT head unremarkable. Exam nonfocal.    Impression: Breakthrough seizures, possibly focal (given aura) with secondary generalization    Recommend:  [] Video EEG monitoring  [] Check AED levels, CBC, CMP, Mg, Phos, urinalysis, drug and toxicology screen   [] basic infectious workup per primary team  [] Seizure, fall and aspiration precautions. Avoid sleep deprivation.   [] IV lorazepam 1mg IV prn for seizure activity lasting >3-5mins  [] baseline EKG and CXR    [] If clinically possible, avoid Wellbutrin and Tramadol.  Carefully weigh risk/benefit ratio when considering the use of other drugs that are also known to lower seizure threshold such as SSRI's, Wellbutrin and Tramadol.       [] Given concern for seizure, advise the patient with regards to risks and driving privileges associated with the [place] Guidelines. Advise patient regarding the risk of seizures and general seizure safety recommendations including not to be bathing alone, climbing to high places and operating heavy machinery, until cleared by follow-up outpatient Neurology. Reinforce the importance of compliance with medications. Discuss sleep hygiene and the risks of sleep disruption. Discuss the risk of death associated with seizures / SUDEP.     [] Please note: if patient has a convulsion, please document length of episode, specifically what patient was doing paying attention to eye opening vs closure, gaze deviation, shaking of extremities, tongue bite, urinary incontinence, any derangement of vital signs.      * Case and plan not final until Attending attestation *

## 2023-06-13 NOTE — DISCHARGE NOTE PROVIDER - NSFOLLOWUPCLINICSTOKEN_GEN_ALL_ED_FT
Hypothyroidism Hypothyroidism 042482: || ||00\01||False; [id number]: || ||00\01||False;[id number]: || ||00\01||False;

## 2023-06-13 NOTE — DISCHARGE NOTE PROVIDER - NSDCFUADDINST_GEN_ALL_CORE_FT
No driving for 1 year from date of last seizure as per [place] law  2. No taking a bath alone or showering with standing water > 2 inches  3. No swimming unsupervised  4. No use of automatic or semi-automatic firearms and no using other firearms unsupervised  5. No use of heavy machinery unsupervised  6. No cooking over an open flame on the front burners (back burners OK)

## 2023-06-13 NOTE — DISCHARGE NOTE PROVIDER - NSDCCPCAREPLAN_GEN_ALL_CORE_FT
PRINCIPAL DISCHARGE DIAGNOSIS  Diagnosis: Seizure  Assessment and Plan of Treatment: You presented to the hospital for seizures. Studies obtained (EEG) confirmed  seizures. Neurology was consulted and your medications were adjusted. Please continue to take your medications as prescribed following your discharge.  MR brain obtained sig for white matter microvascular ischemic disease. Seizures now well controlled. Per family, pt likely had been taking advil at home instead of keppra. Discussed w. neurology, recs dc on  Vimpat 100 BID, Keppra 1500 mg BID and Lamictal 25 mg bid. Slowly titrate lamotrigine up as outpatient - increase to 50 mg BID after 2 weeks on 25 BID. Discussed w/ family. Recommend outpatient follow up upon discharge at [address] [place] [address] with Dr. Flavio Vasquez. Hemodynamically stable for discharge.        PRINCIPAL DISCHARGE DIAGNOSIS  Diagnosis: Seizure  Assessment and Plan of Treatment: You presented to the hospital for seizures. Studies obtained (EEG) confirmed  seizures. Neurology was consulted and your medications were adjusted. Please continue to take your medications as prescribed following your discharge:  Vimpat 100 twice a day, Keppra 1500 mg twice a day and Lamictal 25 mg twice a day. Your lamictal can be increased to 50mg after 2 weeks.  Please follow up outpatient at [address] [place] [address] with Dr. Flavio Vasquez. Please return to the hospital should you experience any new or worsening symptoms.        PRINCIPAL DISCHARGE DIAGNOSIS  Diagnosis: Seizure  Assessment and Plan of Treatment: You presented to the hospital for seizures. Studies obtained (EEG) confirmed  seizures. Neurology was consulted and your medications were adjusted. Please continue to take your medications as prescribed following your discharge:  Vimpat 150 twice a day, Keppra 1500 mg twice a day and Lamictal 25 mg twice a day. Your lamictal can be increased to 50mg after 2 weeks (already received 1 week inpatient therefore the higher dose 50mg twice a day should be started on 6/27/230. Please return to the hospital should you experience any new or worsening symptoms.  Follow up [facility] Neurologist Trenton Mora or Ambrosio Shepard.

## 2023-06-13 NOTE — H&P ADULT - HISTORY OF PRESENT ILLNESS
64 y/o M with pmhx of seizure disorder and HTN who presents after seizure.           In ED, VSS. Received ativan 1mg x1, zofran 4mg x1 and 1L NS bolus. While in triage, patient had GTC seizure for over 1 minute. CTH negative for acute stroke, hemorrhage or lesions. Seen by neurology  in ED started on Keppra. Admitted to medicine for further workup and management  64 y/o M with pmhx of seizure disorder and HTN who presents after seizure. Patient states that for the last 2 days she has been feeling fatigued and stressed. Patient states that sh has been under alot of stress due to her son. She endorses feeling fatigued and having numbness throughout her body. She starts that she called her daughter stating that she felt like she was gonna die. her daughter then called EMS. While patient was en route to ER she had jerking movements in her extremities. While in the ED triage, patient had seizure like activity for over 1 minute. She received 1mg ativan in ED.       In ED, VSS. Received ativan 1mg x1, zofran 4mg x1 and 1L NS bolus. While in triage, patient had GTC seizure for over 1 minute. CTH negative for acute stroke, hemorrhage or lesions. Seen by neurology  in ED started on Keppra. Admitted to medicine for further workup and management.

## 2023-06-13 NOTE — H&P ADULT - NSHPREVIEWOFSYSTEMS_GEN_ALL_CORE
CONSTITUTIONAL: No fever, no chills  EYES: No eye pain, no acute blindness  Mouth: no pain in mouth, no cuts  RESPIRATORY: No cough, No sob  CARDIOVASCULAR: No CP, no palpitations  GASTROINTESTINAL: no abdominal pain, no n/v/d  GENITOURINARY: No dysuria, no hematuria  Heme: No easy bruising, no swelling of neck  NEUROLOGICAL: + seizure, No acute paralysis  SKIN: No itching, no rashes  MUSCULOSKELETAL: No acute joint pain, no joint swelling

## 2023-06-13 NOTE — PATIENT PROFILE ADULT - FALL HARM RISK - HARM RISK INTERVENTIONS

## 2023-06-13 NOTE — CONSULT NOTE ADULT - ATTENDING COMMENTS
Sky  : By myself ( Boni Parks MD).  I  agree with the findings and plan as documented in the Resident's note except below.  During my assessment, patient was very pleasant and cooperative and no focal deficit on exam. Etiology of seizure is due to the subtherapeutic dose of Keppra 500 mg daily. She confirmed that her seizure are controlled on therapeutic dose of Keppra. Last seizure was 8 year ago 2015. Will resume the Keppra 750 mg BID therapeutic dose.  She is concerned for side effects of Keppra and at baseline she also has anxiety . Therefore, she would benefit from Lamictal 25 mg BID if she tolerate the Lamictal without side effects can be increase on weekly basis and goal is 100 mg BID. Side effects were discussed the including rash. Routine VEEG for 30 minutes.   Outpatient follow up with neurologist,   Seizure Precautions discussed:  1. No driving for 1 year from date of last seizure as per [place] law  2. No taking a bath alone or showering with standing water > 2 inches  3. No swimming unsupervised  4. No use of automatic or semi-automatic firearms and no using other firearms unsupervised  5. No use of heavy machinery unsupervised  6. No cooking over an open flame on the front burners (back burners OK)  7. For additional recommendations please discuss with neurology or PCP as outpatient    I discussed the diagnosis and treatment plan with the patient and daughter.  All questions and concerns were addressed. The patient demonstrated good understanding of the treatment plan.  If you have any further questions, please do not hesitate to contact our team.  Thank you for allowing us to participate in this patient care.

## 2023-06-13 NOTE — DISCHARGE NOTE PROVIDER - NSDCMRMEDTOKEN_GEN_ALL_CORE_FT
LEVETIRACETAM 500 MG TABLET: TAKE 1 TABLET BY MOUTH AT NIGHT  LOSARTAN POTASSIUM 25 MG TAB: TAKE 1 TABLET BY MOUTH IN THE MORNING AND 1 TABLET IN THE EVENING.   Keppra 750 mg oral tablet: 2 tab(s) orally 2 times a day  lacosamide 150 mg oral tablet: 1 tab(s) orally 2 times a day MDD: 2 tabs  lamoTRIgine 25 mg oral tablet: 1 tab(s) orally 2 times a day Please take 25mg twice a day for 1 week then increase to 50mg twice a day (starting 6/27 should be on 50mg twice a day) --&gt; can go up as needed by your outpatient Neurologist  losartan 100 mg oral tablet: 1 tab(s) orally once a day  melatonin 3 mg oral tablet: 1 tab(s) orally once a day (at bedtime) As needed Insomnia  physical therapy: 3-5x/week  polyethylene glycol 3350 oral powder for reconstitution: 17 gram(s) orally once a day (at bedtime)  senna leaf extract oral tablet: 2 tab(s) orally once a day (at bedtime) As needed Constipation

## 2023-06-13 NOTE — DISCHARGE NOTE PROVIDER - HOSPITAL COURSE
63y  woman, Sky speaking, PMH HTN on losartan, epilepsy on Keppra, who presented to [facility] ED for seizures, neurology consulted. EEG w/ seizures. AEDs adjusted per neuro recs. MR brain obtained sig for white matter microvascular ischemic disease. Seizures now well controlled. Per family, pt likely had been taking advil at home instead of keppra. Discussed w. neurology, recs dc on  Vimpat 100 BID, Keppra 1500 mg BID and Lamictal 25 mg bid. Slowly titrate lamotrigine up as outpatient - increase to 50 mg BID after 2 weeks on 25 BID. Discussed w/ family. Recommend outpatient follow up upon discharge at [address] [place] [address] with Dr. Flavio Vasquez. Hemodynamically stable for discharge.    63y  woman, Sky speaking, PMH HTN on losartan, epilepsy on Keppra, who presented to [facility] ED for seizures, neurology consulted. EEG w/ seizures. AEDs adjusted per neuro recs. MR brain obtained sig for white matter microvascular ischemic disease. Seizures now well controlled. Per family, pt likely had been taking advil at home instead of keppra. Discussed w. neurology, recs dc on  Vimpat 150 BID, Keppra 1500 mg BID and Lamictal 25 mg bid. Slowly titrate lamotrigine up as outpatient - increase to 50 mg BID after 2 weeks on 25 BID. Discussed w/ family. Recommend outpatient follow up upon discharge at [address] [place] [address] with Dr. Flavio Vasquez. Hemodynamically stable for discharge.     PA Addendum: Spoke with Neurology for final medication recommendations. Meds sent to [facility] - all covered by insurance and they will do meds to bed. CM set up [facility] to assist with medications at home. Family at bedside in agreement w/ discharge.  PT script given to pt.

## 2023-06-13 NOTE — DISCHARGE NOTE PROVIDER - NSDCFUADDAPPT_GEN_ALL_CORE_FT
Follow up [facility] Neurologist Trenton Mora or Ambrosio Shepard.    [facility] outpatient clinic or the Crisis Clinic on discharge ([phone]).

## 2023-06-13 NOTE — DISCHARGE NOTE PROVIDER - CARE PROVIDER_API CALL
Trenton Mora  Neurology  [address]  [place] [address]  Phone: [phone]  Fax: [phone]  Follow Up Time:     Ambrosio Shepard  Neurology  [address]  [place] [address]  Phone: [phone]  Fax: [phone]  Follow Up Time:

## 2023-06-13 NOTE — DISCHARGE NOTE PROVIDER - NSFOLLOWUPCLINICS_GEN_ALL_ED_FT
[facility]  Neurology Autoimmune Encephalitis  [address] [place] [address]  Phone: [phone]  Fax: [phone]     [facility]  Neurology Autoimmune Encephalitis  [address] [place] [address]  Phone: [phone]  Fax: [phone]    [facility] Psychiatry  Psychiatry  [address] [place] [address]  Phone: [phone]  Fax:

## 2023-06-13 NOTE — H&P ADULT - PROBLEM SELECTOR PLAN 1
- presents with seizure activity in ED triage for > 1 min, was being weaned off Keppra as outpatient as outpatient, taking 500mg qhs   - neuro consulted, received 2g BID, now switched to 750mg BID + lamictal 25mg qd   - lactate 12.8 on admission, now improved to 1.6, likely elevated in setting   - vEEG pending   - CTH negative for stroke, hemorrhage with no masses or lesions   - aspiration, seizure precautions

## 2023-06-13 NOTE — H&P ADULT - NSHPLABSRESULTS_GEN_ALL_CORE
143  |  104  |  20  ----------------------------<  135<H>  3.4<L>   |  14<L>  |  0.99    Ca    9.5      2023 23:19  Phos  3.6       Mg     2.30         TPro  8.0  /  Alb  4.6  /  TBili  0.2  /  DBili  x   /  AST  22  /  ALT  21  /  AlkPhos  138<H>                      Urinalysis Basic - ( 2023 01:18 )    Color: Colorless / Appearance: Clear / S.012 / pH: x  Gluc: x / Ketone: Negative  / Bili: Negative / Urobili: <2 mg/dL   Blood: x / Protein: Trace / Nitrite: Negative   Leuk Esterase: Negative / RBC: x / WBC x   Sq Epi: x / Non Sq Epi: x / Bacteria: x                              13.0   14.78 )-----------( 208      ( 2023 23:19 )             41.3     CAPILLARY BLOOD GLUCOSE      POCT Blood Glucose.: 111 mg/dL (2023 06:04)  POCT Blood Glucose.: 135 mg/dL (2023 23:09)  POCT Blood Glucose.: 114 mg/dL (2023 21:55)    Blood Gas Source Venous: Venous ( @ 23:19)    < from: CT Head No Cont (23 @ 01:23) >      CT HEAD:    HEMISPHERES:  No mass or space occupying lesion.  No brain edema or   hemorrhagic foci are suggested. Small vessel white matter ischemic   changes are noted.  VENTRICLES:  No hydrocephalus. No midline shift.  POSTERIOR FOSSA:  The brain stem and cerebellum are unremarkable.  EXTRACEREBRAL SPACES:  No subdural or epidural collections are noted.  SKULL BASE AND CALVARIUM:  Intact.  SINUSES AND MASTOIDS:  Mild patchy mucosal thickening of the right   maxillary sinus. The mastoid air cells are clear.  MISCELLANEOUS:  No orbital or suprasellar abnormality noted.      IMPRESSION:    No hydrocephalus,mass effect, midline shift, acute intracranial   hemorrhage, or brain edema.    --- End of Report ---    < end of copied text >    < from: Xray Chest 1 View AP/PA (23 @ 00:09) >        FINDINGS:  The heart size is not well evaluated on this projection.  The lungs are clear.  There is no pneumothorax or pleural effusion.    IMPRESSION:  Clear lungs.    --- End of Report ---    < end of copied text >

## 2023-06-13 NOTE — CHART NOTE - NSCHARTNOTEFT_GEN_A_CORE
Spoke to neurology recommendations for AED as follows.    Keppra 750 twice daily add lamictal mg once daily.    Orders changed to above recommendations Spoke to neurology recommendations for AED as follows.    Keppra 750 twice daily add lamictal mg twice daily.    Orders changed to above recommendations

## 2023-06-13 NOTE — H&P ADULT - ASSESSMENT
62 y/o F with pmhx of seizure disorder and HTN who presents after seizure. Found to have >1 minute seizure in ED triage. Restarted on Keppra. Admitted for seizure treatment and management.

## 2023-06-14 LAB
A1C WITH ESTIMATED AVERAGE GLUCOSE RESULT: 5.6 % — SIGNIFICANT CHANGE UP (ref 4–5.6)
ALBUMIN SERPL ELPH-MCNC: 3.6 G/DL — SIGNIFICANT CHANGE UP (ref 3.3–5)
ALP SERPL-CCNC: 94 U/L — SIGNIFICANT CHANGE UP (ref 40–120)
ALT FLD-CCNC: 15 U/L — SIGNIFICANT CHANGE UP (ref 4–33)
ANION GAP SERPL CALC-SCNC: 11 MMOL/L — SIGNIFICANT CHANGE UP (ref 7–14)
AST SERPL-CCNC: 17 U/L — SIGNIFICANT CHANGE UP (ref 4–32)
BASOPHILS # BLD AUTO: 0.04 K/UL — SIGNIFICANT CHANGE UP (ref 0–0.2)
BASOPHILS NFR BLD AUTO: 0.6 % — SIGNIFICANT CHANGE UP (ref 0–2)
BILIRUB SERPL-MCNC: 0.3 MG/DL — SIGNIFICANT CHANGE UP (ref 0.2–1.2)
BUN SERPL-MCNC: 15 MG/DL — SIGNIFICANT CHANGE UP (ref 7–23)
CALCIUM SERPL-MCNC: 8.3 MG/DL — LOW (ref 8.4–10.5)
CHLORIDE SERPL-SCNC: 106 MMOL/L — SIGNIFICANT CHANGE UP (ref 98–107)
CHOLEST SERPL-MCNC: 171 MG/DL — SIGNIFICANT CHANGE UP
CO2 SERPL-SCNC: 21 MMOL/L — LOW (ref 22–31)
CREAT SERPL-MCNC: 0.92 MG/DL — SIGNIFICANT CHANGE UP (ref 0.5–1.3)
EGFR: 70 ML/MIN/1.73M2 — SIGNIFICANT CHANGE UP
EOSINOPHIL # BLD AUTO: 0.13 K/UL — SIGNIFICANT CHANGE UP (ref 0–0.5)
EOSINOPHIL NFR BLD AUTO: 2 % — SIGNIFICANT CHANGE UP (ref 0–6)
ESTIMATED AVERAGE GLUCOSE: 114 — SIGNIFICANT CHANGE UP
GLUCOSE SERPL-MCNC: 98 MG/DL — SIGNIFICANT CHANGE UP (ref 70–99)
HCT VFR BLD CALC: 32.7 % — LOW (ref 34.5–45)
HCV AB S/CO SERPL IA: 0.09 S/CO — SIGNIFICANT CHANGE UP (ref 0–0.99)
HCV AB SERPL-IMP: SIGNIFICANT CHANGE UP
HDLC SERPL-MCNC: 39 MG/DL — LOW
HGB BLD-MCNC: 10.7 G/DL — LOW (ref 11.5–15.5)
IANC: 3.8 K/UL — SIGNIFICANT CHANGE UP (ref 1.8–7.4)
IMM GRANULOCYTES NFR BLD AUTO: 0.3 % — SIGNIFICANT CHANGE UP (ref 0–0.9)
LIPID PNL WITH DIRECT LDL SERPL: 96 MG/DL — SIGNIFICANT CHANGE UP
LYMPHOCYTES # BLD AUTO: 2.05 K/UL — SIGNIFICANT CHANGE UP (ref 1–3.3)
LYMPHOCYTES # BLD AUTO: 30.9 % — SIGNIFICANT CHANGE UP (ref 13–44)
MAGNESIUM SERPL-MCNC: 2.2 MG/DL — SIGNIFICANT CHANGE UP (ref 1.6–2.6)
MCHC RBC-ENTMCNC: 28.1 PG — SIGNIFICANT CHANGE UP (ref 27–34)
MCHC RBC-ENTMCNC: 32.7 GM/DL — SIGNIFICANT CHANGE UP (ref 32–36)
MCV RBC AUTO: 85.8 FL — SIGNIFICANT CHANGE UP (ref 80–100)
MONOCYTES # BLD AUTO: 0.6 K/UL — SIGNIFICANT CHANGE UP (ref 0–0.9)
MONOCYTES NFR BLD AUTO: 9 % — SIGNIFICANT CHANGE UP (ref 2–14)
NEUTROPHILS # BLD AUTO: 3.8 K/UL — SIGNIFICANT CHANGE UP (ref 1.8–7.4)
NEUTROPHILS NFR BLD AUTO: 57.2 % — SIGNIFICANT CHANGE UP (ref 43–77)
NON HDL CHOLESTEROL: 132 MG/DL — HIGH
NRBC # BLD: 0 /100 WBCS — SIGNIFICANT CHANGE UP (ref 0–0)
NRBC # FLD: 0 K/UL — SIGNIFICANT CHANGE UP (ref 0–0)
PHOSPHATE SERPL-MCNC: 2.9 MG/DL — SIGNIFICANT CHANGE UP (ref 2.5–4.5)
PLATELET # BLD AUTO: 176 K/UL — SIGNIFICANT CHANGE UP (ref 150–400)
POTASSIUM SERPL-MCNC: 3.5 MMOL/L — SIGNIFICANT CHANGE UP (ref 3.5–5.3)
POTASSIUM SERPL-SCNC: 3.5 MMOL/L — SIGNIFICANT CHANGE UP (ref 3.5–5.3)
PROT SERPL-MCNC: 6.5 G/DL — SIGNIFICANT CHANGE UP (ref 6–8.3)
RBC # BLD: 3.81 M/UL — SIGNIFICANT CHANGE UP (ref 3.8–5.2)
RBC # FLD: 13.2 % — SIGNIFICANT CHANGE UP (ref 10.3–14.5)
SODIUM SERPL-SCNC: 138 MMOL/L — SIGNIFICANT CHANGE UP (ref 135–145)
TRIGL SERPL-MCNC: 178 MG/DL — HIGH
WBC # BLD: 6.64 K/UL — SIGNIFICANT CHANGE UP (ref 3.8–10.5)
WBC # FLD AUTO: 6.64 K/UL — SIGNIFICANT CHANGE UP (ref 3.8–10.5)

## 2023-06-14 PROCEDURE — 95720 EEG PHY/QHP EA INCR W/VEEG: CPT

## 2023-06-14 PROCEDURE — 99233 SBSQ HOSP IP/OBS HIGH 50: CPT

## 2023-06-14 PROCEDURE — 99233 SBSQ HOSP IP/OBS HIGH 50: CPT | Mod: GC

## 2023-06-14 RX ORDER — SENNA PLUS 8.6 MG/1
2 TABLET ORAL AT BEDTIME
Refills: 0 | Status: DISCONTINUED | OUTPATIENT
Start: 2023-06-14 | End: 2023-06-20

## 2023-06-14 RX ORDER — POLYETHYLENE GLYCOL 3350 17 G/17G
17 POWDER, FOR SOLUTION ORAL AT BEDTIME
Refills: 0 | Status: DISCONTINUED | OUTPATIENT
Start: 2023-06-14 | End: 2023-06-20

## 2023-06-14 RX ADMIN — LAMOTRIGINE 25 MILLIGRAM(S): 25 TABLET, ORALLY DISINTEGRATING ORAL at 06:00

## 2023-06-14 RX ADMIN — POLYETHYLENE GLYCOL 3350 17 GRAM(S): 17 POWDER, FOR SOLUTION ORAL at 22:10

## 2023-06-14 RX ADMIN — ENOXAPARIN SODIUM 40 MILLIGRAM(S): 100 INJECTION SUBCUTANEOUS at 17:52

## 2023-06-14 RX ADMIN — LAMOTRIGINE 25 MILLIGRAM(S): 25 TABLET, ORALLY DISINTEGRATING ORAL at 17:19

## 2023-06-14 RX ADMIN — SENNA PLUS 2 TABLET(S): 8.6 TABLET ORAL at 16:30

## 2023-06-14 RX ADMIN — LEVETIRACETAM 400 MILLIGRAM(S): 250 TABLET, FILM COATED ORAL at 05:59

## 2023-06-14 RX ADMIN — LOSARTAN POTASSIUM 25 MILLIGRAM(S): 100 TABLET, FILM COATED ORAL at 06:02

## 2023-06-14 RX ADMIN — LEVETIRACETAM 400 MILLIGRAM(S): 250 TABLET, FILM COATED ORAL at 17:17

## 2023-06-14 NOTE — PROGRESS NOTE ADULT - SUBJECTIVE AND OBJECTIVE BOX
SUBJECTIVE/INTERVAL HISTORY:  - Son and daughter at bed side, updated  - Remaining on vEEG due to discharges seen yesterday  - Transitioned to Keppra 1500 mg bid IVPB (given at ~06:00hr) and continuing lamictal 25 mg bid    VITALS & EXAMINATION:  Vital Signs Last 24 Hrs  T(C): 36.7 (2023 10:46), Max: 37.2 (2023 17:32)  T(F): 98 (2023 10:46), Max: 99 (2023 17:32)  HR: 88 (2023 10:46) (78 - 97)  BP: 134/75 (2023 10:46) (105/61 - 135/73)  BP(mean): --  RR: 18 (2023 10:46) (18 - 18)  SpO2: 99% (2023 10:46) (97% - 100%)    Parameters below as of 2023 10:46  Patient On (Oxygen Delivery Method): room air    Neurologic Exam:  Mental status - Eyes open. Responds well to verbal stimuli. Awake and alert. Oriented to person, place, time. Speech fluent. Follows simple commands.      Cranial nerves - Pupils round and reactive to light, asymmetric, VFF, EOMI, face sensation (V1-V3) intact b/l, facial strength intact without asymmetry b/l    Motor - Normal bulk and tone throughout. No pronator drift. 5/5 throughout all extremities    Sensation - Light touch  intact throughout    DTR's - Biceps, BR, patellar b/l 2+,  toes downgoing b/l     Coordination - Finger to Nose intact b/l. No tremors appreciated    Gait and station - deferred for patient safety      LABORATORY:  CBC                       10.7   6.64  )-----------( 176      ( 2023 06:35 )             32.7     Chem 06-14    138  |  106  |  15  ----------------------------<  98  3.5   |  21<L>  |  0.92    Ca    8.3<L>      2023 06:35  Phos  2.9     06-14  Mg     2.20     06-14    TPro  6.5  /  Alb  3.6  /  TBili  0.3  /  DBili  x   /  AST  17  /  ALT  15  /  AlkPhos  94  -    LFTs LIVER FUNCTIONS - ( 2023 06:35 )  Alb: 3.6 g/dL / Pro: 6.5 g/dL / ALK PHOS: 94 U/L / ALT: 15 U/L / AST: 17 U/L / GGT: x           Coagulopathy   Lipid Panel  Chol 171 LDL -- HDL 39<L> Trig 178<H>  A1c   Cardiac enzymes     U/A Urinalysis Basic - ( 2023 01:18 )    Color: Colorless / Appearance: Clear / S.012 / pH: x  Gluc: x / Ketone: Negative  / Bili: Negative / Urobili: <2 mg/dL   Blood: x / Protein: Trace / Nitrite: Negative   Leuk Esterase: Negative / RBC: x / WBC x   Sq Epi: x / Non Sq Epi: x / Bacteria: x      CSF  Immunological  Other    STUDIES & IMAGING:  Studies (EKG, EEG, EMG, etc):     EEG :  Clinical Impression:  At least 11 poorly lateralized electrographic seizures from the posterior head region captured as described, Last clear seizure at 4:46 23.   Evidence for left posterior temporal cerebral dysfunction    Radiology (XR, CT, MR, U/S, TTE/KIERAN):

## 2023-06-14 NOTE — PROGRESS NOTE ADULT - ASSESSMENT
63y (1960) woman, Sky speaking, PMH HTN on losartan, epilepsy on Keppra, who presents to [facility] ED for seizure. Had 60 seconds GTC at home with urinary incontinence. Takes home Keppra inconsistently, supposed to take 500 mg bid but has been taking 500 mg in AM. Sees Dr. Mara Clancy at outpatient neurology. Neuro exam stable. Overnight EEG showed 11 poorly lateralized electrographic seizures from posterior head region captured, last clear seizure at 0446 hr on 6/14/23. Given Keppra 1500 mg IVPB in 6AM.     Impression: Breakthrough seizure, likely due to medication non-adherence vs new epileptiform activities    Recommendations:  [] Continue vEEG monitoring  [] C/w Keppra 1500 mg BID and Lamictal 25 mg bid (on 6/27, can titrate up to 50 mg bid)  [] Neurocheck and vital q4  [] if patient has a convulsion, please document accurately the length of the episode, and specifically what the patient was doing paying attention to eye opening vs closure, gaze deviation, shaking of extremities, tongue bite, urinary incontinence, any derangement of vital signs  [] If patient has a generalized tonic clonic episode for >3 minutes or significant derangement of vital signs may administer Ativan 1mg IV and call [phone] for further reocmmendations  [] advise patient to not drive, operate heavy machinery, avoid heights, pools, bathtubs, locked doors.  [] will f/u outpatient upon discharge with Dr. Clancy    Case seen and discussed with general neurology attending Dr. Parks

## 2023-06-14 NOTE — CHART NOTE - NSCHARTNOTEFT_GEN_A_CORE
late entry     Patient assessed at bedside this AM after RN notified that patient with pupils L>R. Overnight patient also had episodes of seizure on EEG and meds increased. Chart reviewed and asymmetrical pupils were noted by neurology previously. Arrived at bedside, patient paying comfortably in bed, daughter Pema there as well. Patient denies any changes in vision, headache, nausea or vomiting weakness in extremities, or pain. Patient and daughter note that patient has not had any physical seizures overnight. Pupils assessed and appear asymmetric L > R, reactive to light. Equal strength in upper and lower limbs. AOx4. Daughter said that patient's speech is much improved compared to yesterday (previously speaking "slower"). Overall evaluation consistent with documented neurology findings from 6/13. Neurology to follow up with patient.      Tamanna Richards PA-C  Department of Internal Medicine  pager [phone], available on [employer] TEAMS

## 2023-06-14 NOTE — PROGRESS NOTE ADULT - ASSESSMENT
63 y.o. Female  w/ hx  seizure disorder, HTN who presents after seizure. Found to have >1 minute seizure in ED triage. Restarted on Keppra. Admitted for seizure treatment and management.

## 2023-06-14 NOTE — PROGRESS NOTE ADULT - ATTENDING COMMENTS
Sky  : By myself ( Boni Parks MD).  Patient was seen and examined at bedside.   Non focal exam except asymmetric left pupil is bigger and non reactive and right pupil is reactive.     I  agree with the findings and plan as documented in the Resident's note except below.  Etiology of status epilepticus is due to the subtherapeutic antiepileptic medication. Keppra dose was increase from 750 mg to 1500 mg BID. Lamictal 25 mg BID. Clinically patient is stable. Continue VEEG.   I discussed the diagnosis and treatment plan with the patient and daughter.  All questions and concerns were addressed. The patient demonstrated good understanding of the treatment plan.  If you have any further questions, please do not hesitate to contact our team.  Thank you for allowing us to participate in this patient care.

## 2023-06-14 NOTE — CHART NOTE - NSCHARTNOTEFT_GEN_A_CORE
[facility] NIGHT MEDICINE COVERAGE - Late Entry    Notified by Epilepsy Fellow, Dr. Starks, that patient was noted to have 5 discharges on her EEG, concerning for generalized seizure activity.  Fellow recommended to give Keppra 750mg IVPB STAT, and increase Keppra to 1500mg BID in AM.  Orders placed.  Pt assessed at bedside.  Pt is awake and alert, son and daughter present at bedside.  Son and daughter note pt's speech has been slower from her baseline throughout the day, pt otherwise denies any new symptoms.  Explained that discharges were noted on the EEG by the epilepsy doctor, which raises concern for seizures.  Explained the plan to give additional dose of Keppra tonight, and that the dose is being increased in the morning.  EEG still in place, monitoring ongoing.    Explained that slowing in speech can be a side-effect of the anti-seizure medications as they can cause fatigue.  Emotional support provided, all questions answered.  Neurology ([id number]) updated, will f/u any additional recommendations.  Case d/w overnight [facility], Dr. Shepard.  Pt stable at this time, will continue to monitor.    Vital Signs Last 24 Hrs  T(C): 37.2 (13 Jun 2023 21:19), Max: 37.2 (13 Jun 2023 17:32)  T(F): 98.9 (13 Jun 2023 21:19), Max: 99 (13 Jun 2023 17:32)  HR: 97 (13 Jun 2023 21:19) (79 - 97)  BP: 131/81 (13 Jun 2023 21:19) (105/61 - 140/84)  BP(mean): 101 (13 Jun 2023 04:17) (101 - 101)  RR: 18 (13 Jun 2023 21:19) (18 - 18)  SpO2: 98% (13 Jun 2023 21:19) (98% - 100%)    O2 Parameters below as of 13 Jun 2023 21:19  Patient On (Oxygen Delivery Method): room air    Justin Diaz PA-C  Department of Medicine - [facility]  In-House Pager: [phone]

## 2023-06-14 NOTE — PROVIDER CONTACT NOTE (OTHER) - ASSESSMENT
A&Ox4. VS stable. Left pupil measuring approx 5mm right measuring 3 mm. Both reacting to light. Face symmetry noted. NO limb drifts noted on assessment.

## 2023-06-14 NOTE — PROGRESS NOTE ADULT - PROBLEM SELECTOR PLAN 1
- presents with seizure activity in ED triage for > 1 min, was being weaned off Keppra as outpatient as outpatient, taking 500mg qhs   - neuro consulted, received 2g BID  -c/w Keppra 1500mg bid + lamictal 25mg qd   - lactate 12.8 on admission, now improved to 1.6, likely elevated in setting   - vEEG pending   - CTH negative for stroke, hemorrhage with no masses or lesions   - aspiration, seizure precautions - presents with seizure activity in ED triage for > 1 min, was being weaned off Keppra as outpatient as outpatient, taking 500mg qhs   - neuro consulted, received 2g BID  -c/w Keppra 1500mg bid + lamictal 25mg qd   - lactate 12.8 on admission, now improved to 1.6, likely elevated in setting   - vEEG pending   - CTH negative for stroke, hemorrhage with no masses or lesions   - aspiration, seizure precautions  -Patient says stress is causing depression and is ok with seeing a psychiatrist about it.   Consult psychiatry in a.m.

## 2023-06-14 NOTE — CHART NOTE - NSCHARTNOTEFT_GEN_A_CORE
Notified by RN that patient's family would like to speak with provider. Patient, daughter, and son seen at bedside by provider. Provider explained EEG process. Patient expressed concerns that perhaps her pharmacy gave her the wrong medication which caused breakthrough seizure. All questions and concerns addressed. Will continue to monitor and support patient and family.

## 2023-06-14 NOTE — CHART NOTE - NSCHARTNOTEFT_GEN_A_CORE
EEG reviewed from 08:00 until time of this note.     Two seizures captured from the posterior head region. Last seizure at 17:55. No clear clinical correlation. Neurology team informed of the findings.     Full report will be available tomorrow.

## 2023-06-14 NOTE — PROGRESS NOTE ADULT - SUBJECTIVE AND OBJECTIVE BOX
Patient is a 63y old  Female who presents with a chief complaint of seizures (2023 12:49)      SUBJECTIVE / OVERNIGHT EVENTS:    MEDICATIONS  (STANDING):  enoxaparin Injectable 40 milliGRAM(s) SubCutaneous every 24 hours  lamoTRIgine 25 milliGRAM(s) Oral two times a day  levETIRAcetam  IVPB 1500 milliGRAM(s) IV Intermittent every 12 hours  losartan 25 milliGRAM(s) Oral daily  polyethylene glycol 3350 17 Gram(s) Oral at bedtime    MEDICATIONS  (PRN):  acetaminophen     Tablet .. 650 milliGRAM(s) Oral every 6 hours PRN Temp greater or equal to 38C (100.4F), Mild Pain (1 - 3)  aluminum hydroxide/magnesium hydroxide/simethicone Suspension 30 milliLiter(s) Oral every 4 hours PRN Dyspepsia  LORazepam   Injectable 1 milliGRAM(s) IV Push every 6 hours PRN seizure activity  melatonin 3 milliGRAM(s) Oral at bedtime PRN Insomnia  ondansetron Injectable 4 milliGRAM(s) IV Push every 8 hours PRN Nausea and/or Vomiting  senna 2 Tablet(s) Oral at bedtime PRN Constipation      Vital Signs Last 24 Hrs  T(C): 37.2 (2023 13:15), Max: 37.2 (2023 17:32)  T(F): 98.9 (2023 13:15), Max: 99 (2023 17:32)  HR: 83 (2023 13:15) (78 - 97)  BP: 124/64 (2023 13:15) (105/61 - 135/73)  BP(mean): --  RR: 18 (2023 13:15) (18 - 18)  SpO2: 98% (2023 13:15) (97% - 100%)    Parameters below as of 2023 13:15  Patient On (Oxygen Delivery Method): room air      CAPILLARY BLOOD GLUCOSE        I&O's Summary    2023 07:01  -  2023 07:00  --------------------------------------------------------  IN: 580 mL / OUT: 0 mL / NET: 580 mL    2023 07:01  -  2023 14:11  --------------------------------------------------------  IN: 575 mL / OUT: 0 mL / NET: 575 mL        PHYSICAL EXAM:  GENERAL: NAD, well-developed  HEAD:  Atraumatic, Normocephalic  EYES: EOMI, PERRLA, conjunctiva and sclera clear  NECK: Supple, No JVD  CHEST/LUNG: Clear to auscultation bilaterally; No wheeze  HEART: Regular rate and rhythm; No murmurs, rubs, or gallops  ABDOMEN: Soft, Nontender, Nondistended; Bowel sounds present  EXTREMITIES:  2+ Peripheral Pulses, No clubbing, cyanosis, or edema  PSYCH: AAOx3  NEUROLOGY: non-focal  SKIN: No rashes or lesions    LABS:                        10.7   6.64  )-----------( 176      ( 2023 06:35 )             32.7     06-14    138  |  106  |  15  ----------------------------<  98  3.5   |  21<L>  |  0.92    Ca    8.3<L>      2023 06:35  Phos  2.9     06-14  Mg     2.20     06-14    TPro  6.5  /  Alb  3.6  /  TBili  0.3  /  DBili  x   /  AST  17  /  ALT  15  /  AlkPhos  94  06-14          Urinalysis Basic - ( 2023 01:18 )    Color: Colorless / Appearance: Clear / S.012 / pH: x  Gluc: x / Ketone: Negative  / Bili: Negative / Urobili: <2 mg/dL   Blood: x / Protein: Trace / Nitrite: Negative   Leuk Esterase: Negative / RBC: x / WBC x   Sq Epi: x / Non Sq Epi: x / Bacteria: x        RADIOLOGY & ADDITIONAL TESTS:    Imaging Personally Reviewed:    Consultant(s) Notes Reviewed:      Care Discussed with Consultants/Other Providers:   Patient is a 63y old  Female who presents with a chief complaint of seizures (2023 12:49)      SUBJECTIVE / OVERNIGHT EVENTS:  Patient has no new complaints. 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 Denies cp, SOB, abdominal pain, N/V/D     MEDICATIONS  (STANDING):  enoxaparin Injectable 40 milliGRAM(s) SubCutaneous every 24 hours  lamoTRIgine 25 milliGRAM(s) Oral two times a day  levETIRAcetam  IVPB 1500 milliGRAM(s) IV Intermittent every 12 hours  losartan 25 milliGRAM(s) Oral daily  polyethylene glycol 3350 17 Gram(s) Oral at bedtime    MEDICATIONS  (PRN):  acetaminophen     Tablet .. 650 milliGRAM(s) Oral every 6 hours PRN Temp greater or equal to 38C (100.4F), Mild Pain (1 - 3)  aluminum hydroxide/magnesium hydroxide/simethicone Suspension 30 milliLiter(s) Oral every 4 hours PRN Dyspepsia  LORazepam   Injectable 1 milliGRAM(s) IV Push every 6 hours PRN seizure activity  melatonin 3 milliGRAM(s) Oral at bedtime PRN Insomnia  ondansetron Injectable 4 milliGRAM(s) IV Push every 8 hours PRN Nausea and/or Vomiting  senna 2 Tablet(s) Oral at bedtime PRN Constipation      Vital Signs Last 24 Hrs  T(C): 37.2 (2023 13:15), Max: 37.2 (2023 17:32)  T(F): 98.9 (2023 13:15), Max: 99 (2023 17:32)  HR: 83 (2023 13:15) (78 - 97)  BP: 124/64 (2023 13:15) (105/61 - 135/73)  BP(mean): --  RR: 18 (2023 13:15) (18 - 18)  SpO2: 98% (2023 13:15) (97% - 100%)    Parameters below as of 2023 13:15  Patient On (Oxygen Delivery Method): room air      CAPILLARY BLOOD GLUCOSE        I&O's Summary    2023 07:01  -  2023 07:00  --------------------------------------------------------  IN: 580 mL / OUT: 0 mL / NET: 580 mL    2023 07:01  -  2023 14:11  --------------------------------------------------------  IN: 575 mL / OUT: 0 mL / NET: 575 mL        PHYSICAL EXAM:  GENERAL: NAD, well-developed  HEAD:  Atraumatic, Normocephalic  EYES: EOMI, PERRLA, conjunctiva and sclera clear  NECK: Supple, No JVD  CHEST/LUNG: Clear to auscultation bilaterally; No wheeze  HEART: Regular rate and rhythm; No murmurs, rubs, or gallops  ABDOMEN: Soft, Nontender, Nondistended; Bowel sounds present  EXTREMITIES:  2+ Peripheral Pulses, No clubbing, cyanosis, or edema  PSYCH: AAOx3  NEUROLOGY: non-focal  SKIN: No rashes or lesions    LABS:                        10.7   6.64  )-----------( 176      ( 2023 06:35 )             32.7     06-14    138  |  106  |  15  ----------------------------<  98  3.5   |  21<L>  |  0.92    Ca    8.3<L>      2023 06:35  Phos  2.9     -14  Mg     2.20     -    TPro  6.5  /  Alb  3.6  /  TBili  0.3  /  DBili  x   /  AST  17  /  ALT  15  /  AlkPhos  94  06-14          Urinalysis Basic - ( 2023 01:18 )    Color: Colorless / Appearance: Clear / S.012 / pH: x  Gluc: x / Ketone: Negative  / Bili: Negative / Urobili: <2 mg/dL   Blood: x / Protein: Trace / Nitrite: Negative   Leuk Esterase: Negative / RBC: x / WBC x   Sq Epi: x / Non Sq Epi: x / Bacteria: x        RADIOLOGY & ADDITIONAL TESTS:    Imaging Personally Reviewed:    Consultant(s) Notes Reviewed:      Care Discussed with Consultants/Other Providers:

## 2023-06-15 LAB
ANION GAP SERPL CALC-SCNC: 13 MMOL/L — SIGNIFICANT CHANGE UP (ref 7–14)
BUN SERPL-MCNC: 13 MG/DL — SIGNIFICANT CHANGE UP (ref 7–23)
CALCIUM SERPL-MCNC: 8.8 MG/DL — SIGNIFICANT CHANGE UP (ref 8.4–10.5)
CHLORIDE SERPL-SCNC: 105 MMOL/L — SIGNIFICANT CHANGE UP (ref 98–107)
CO2 SERPL-SCNC: 20 MMOL/L — LOW (ref 22–31)
CREAT SERPL-MCNC: 0.92 MG/DL — SIGNIFICANT CHANGE UP (ref 0.5–1.3)
EGFR: 70 ML/MIN/1.73M2 — SIGNIFICANT CHANGE UP
GLUCOSE SERPL-MCNC: 96 MG/DL — SIGNIFICANT CHANGE UP (ref 70–99)
HCT VFR BLD CALC: 34.7 % — SIGNIFICANT CHANGE UP (ref 34.5–45)
HGB BLD-MCNC: 11.5 G/DL — SIGNIFICANT CHANGE UP (ref 11.5–15.5)
LEVETIRACETAM SERPL-MCNC: <2 UG/ML — LOW (ref 10–40)
MAGNESIUM SERPL-MCNC: 2.2 MG/DL — SIGNIFICANT CHANGE UP (ref 1.6–2.6)
MCHC RBC-ENTMCNC: 28.5 PG — SIGNIFICANT CHANGE UP (ref 27–34)
MCHC RBC-ENTMCNC: 33.1 GM/DL — SIGNIFICANT CHANGE UP (ref 32–36)
MCV RBC AUTO: 85.9 FL — SIGNIFICANT CHANGE UP (ref 80–100)
NRBC # BLD: 0 /100 WBCS — SIGNIFICANT CHANGE UP (ref 0–0)
NRBC # FLD: 0 K/UL — SIGNIFICANT CHANGE UP (ref 0–0)
PHOSPHATE SERPL-MCNC: 3.6 MG/DL — SIGNIFICANT CHANGE UP (ref 2.5–4.5)
PLATELET # BLD AUTO: 174 K/UL — SIGNIFICANT CHANGE UP (ref 150–400)
POTASSIUM SERPL-MCNC: 3.8 MMOL/L — SIGNIFICANT CHANGE UP (ref 3.5–5.3)
POTASSIUM SERPL-SCNC: 3.8 MMOL/L — SIGNIFICANT CHANGE UP (ref 3.5–5.3)
RBC # BLD: 4.04 M/UL — SIGNIFICANT CHANGE UP (ref 3.8–5.2)
RBC # FLD: 13.2 % — SIGNIFICANT CHANGE UP (ref 10.3–14.5)
SODIUM SERPL-SCNC: 138 MMOL/L — SIGNIFICANT CHANGE UP (ref 135–145)
WBC # BLD: 6.09 K/UL — SIGNIFICANT CHANGE UP (ref 3.8–10.5)
WBC # FLD AUTO: 6.09 K/UL — SIGNIFICANT CHANGE UP (ref 3.8–10.5)

## 2023-06-15 PROCEDURE — 99233 SBSQ HOSP IP/OBS HIGH 50: CPT

## 2023-06-15 PROCEDURE — 99223 1ST HOSP IP/OBS HIGH 75: CPT

## 2023-06-15 PROCEDURE — 93010 ELECTROCARDIOGRAM REPORT: CPT

## 2023-06-15 PROCEDURE — 95720 EEG PHY/QHP EA INCR W/VEEG: CPT

## 2023-06-15 PROCEDURE — 99233 SBSQ HOSP IP/OBS HIGH 50: CPT | Mod: GC

## 2023-06-15 RX ORDER — LACOSAMIDE 50 MG/1
200 TABLET ORAL ONCE
Refills: 0 | Status: DISCONTINUED | OUTPATIENT
Start: 2023-06-15 | End: 2023-06-15

## 2023-06-15 RX ORDER — LACOSAMIDE 50 MG/1
100 TABLET ORAL EVERY 12 HOURS
Refills: 0 | Status: DISCONTINUED | OUTPATIENT
Start: 2023-06-15 | End: 2023-06-20

## 2023-06-15 RX ORDER — LEVETIRACETAM 250 MG/1
1500 TABLET, FILM COATED ORAL ONCE
Refills: 0 | Status: COMPLETED | OUTPATIENT
Start: 2023-06-15 | End: 2023-06-15

## 2023-06-15 RX ADMIN — LAMOTRIGINE 25 MILLIGRAM(S): 25 TABLET, ORALLY DISINTEGRATING ORAL at 06:20

## 2023-06-15 RX ADMIN — LEVETIRACETAM 400 MILLIGRAM(S): 250 TABLET, FILM COATED ORAL at 11:06

## 2023-06-15 RX ADMIN — LOSARTAN POTASSIUM 25 MILLIGRAM(S): 100 TABLET, FILM COATED ORAL at 06:20

## 2023-06-15 RX ADMIN — LACOSAMIDE 140 MILLIGRAM(S): 50 TABLET ORAL at 11:50

## 2023-06-15 RX ADMIN — ENOXAPARIN SODIUM 40 MILLIGRAM(S): 100 INJECTION SUBCUTANEOUS at 19:03

## 2023-06-15 RX ADMIN — Medication 4 MILLIGRAM(S): at 11:04

## 2023-06-15 RX ADMIN — LEVETIRACETAM 400 MILLIGRAM(S): 250 TABLET, FILM COATED ORAL at 06:20

## 2023-06-15 RX ADMIN — LEVETIRACETAM 400 MILLIGRAM(S): 250 TABLET, FILM COATED ORAL at 18:59

## 2023-06-15 RX ADMIN — LAMOTRIGINE 25 MILLIGRAM(S): 25 TABLET, ORALLY DISINTEGRATING ORAL at 19:02

## 2023-06-15 RX ADMIN — LACOSAMIDE 120 MILLIGRAM(S): 50 TABLET ORAL at 19:10

## 2023-06-15 NOTE — BH CONSULTATION LIAISON ASSESSMENT NOTE - OTHER PAST PSYCHIATRIC HISTORY (INCLUDE DETAILS REGARDING ONSET, COURSE OF ILLNESS, INPATIENT/OUTPATIENT TREATMENT)
Per daughter: saw psychiatrist following death of  2021 for grieving/anxiety, possible history of panic attacks, no history of medication use of psychiatric admission

## 2023-06-15 NOTE — PROGRESS NOTE ADULT - ATTENDING COMMENTS
Sky  : By myself (Boni Parks MD).  Patient was seen and examined at bedside. During follow up visit, patient's daughter was present and I deeply appreciate that.   Non focal exam except asymmetric left pupil is bigger and non reactive and right pupil is reactive.     I  agree with the findings and plan as documented in the Resident's note except below.  Etiology of refractory status epilepticus is due to the subtherapeutic antiepileptic medication. Due to the refractory status epilepticus, Ativan 4 mg stat, Keppra 1500 mg stat ( extra dose ) and start on Vimpat 200 mg stat and 100 mg standing.    Continue Keppra 1500 mg BID. Lamictal 25 mg BID. Through out admission, clinically patient is stable.   Continue VEEG.   MRI brain with seizure protocol once patient is stable and remains seizure free.   Continue medical management, neuro- check and fall precaution.  GI and DVT prophylaxis.  Patient is at high risk for complications and morbidity or mortality. There is high probability of imminent or life threatening deterioration in the patient's condition.  I discussed the diagnosis and treatment plan with the patient and daughter.  All questions and concerns were addressed. The patient demonstrated good understanding of the treatment plan.  If you have any further questions, please do not hesitate to contact our team.  Thank you for allowing us to participate in this patient care.

## 2023-06-15 NOTE — BH CONSULTATION LIAISON ASSESSMENT NOTE - NSBHCHARTREVIEWLAB_PSY_A_CORE FT
11.5   6.09  )-----------( 174      ( 15 Braxton 2023 06:25 )             34.7   06-15    138  |  105  |  13  ----------------------------<  96  3.8   |  20<L>  |  0.92    Ca    8.8      15 Braxton 2023 06:25  Phos  3.6     06-15  Mg     2.20     06-15    TPro  6.5  /  Alb  3.6  /  TBili  0.3  /  DBili  x   /  AST  17  /  ALT  15  /  AlkPhos  94  06-14

## 2023-06-15 NOTE — BH CONSULTATION LIAISON ASSESSMENT NOTE - NSBHATTESTCOMMENTATTENDFT_PSY_A_CORE
Chart reviewed. Pt seen this AM, found to be AA O x 3 but also uncomfortable, and was receiving Ativan as I interviewed her. Had no awareness of her EEG results. Daughter at bedside. Pt agreed to have psychiatry return to assess her (see student note above). Exam currently limited by medical issues/medication effects/delirium but will reassess patient. Agree with above measures and assessment.

## 2023-06-15 NOTE — PROGRESS NOTE ADULT - TIME BILLING
Reviewed lab data, radiology results, consultants' recommendations, documentation in [place], discussed with family, ACP, interdisciplinary staff and/or intervention were performed.

## 2023-06-15 NOTE — BH CONSULTATION LIAISON ASSESSMENT NOTE - NSBHCHARTREVIEWVS_PSY_A_CORE FT
Vital Signs Last 24 Hrs  T(C): 36.6 (15 Braxton 2023 11:17), Max: 37.2 (14 Jun 2023 13:15)  T(F): 97.9 (15 Braxton 2023 11:17), Max: 98.9 (14 Jun 2023 13:15)  HR: 80 (15 Braxton 2023 11:17) (76 - 86)  BP: 133/75 (15 Braxton 2023 11:17) (124/64 - 140/67)  BP(mean): --  RR: 18 (15 Braxton 2023 11:17) (18 - 18)  SpO2: 95% (15 Braxton 2023 11:17) (95% - 98%)    Parameters below as of 15 Braxton 2023 11:17  Patient On (Oxygen Delivery Method): room air

## 2023-06-15 NOTE — BH CONSULTATION LIAISON ASSESSMENT NOTE - CURRENT MEDICATION
MEDICATIONS  (STANDING):  enoxaparin Injectable 40 milliGRAM(s) SubCutaneous every 24 hours  lacosamide IVPB 100 milliGRAM(s) IV Intermittent every 12 hours  lamoTRIgine 25 milliGRAM(s) Oral two times a day  levETIRAcetam  IVPB 1500 milliGRAM(s) IV Intermittent every 12 hours  losartan 25 milliGRAM(s) Oral daily  polyethylene glycol 3350 17 Gram(s) Oral at bedtime    MEDICATIONS  (PRN):  acetaminophen     Tablet .. 650 milliGRAM(s) Oral every 6 hours PRN Temp greater or equal to 38C (100.4F), Mild Pain (1 - 3)  aluminum hydroxide/magnesium hydroxide/simethicone Suspension 30 milliLiter(s) Oral every 4 hours PRN Dyspepsia  LORazepam   Injectable 1 milliGRAM(s) IV Push every 6 hours PRN seizure activity  melatonin 3 milliGRAM(s) Oral at bedtime PRN Insomnia  ondansetron Injectable 4 milliGRAM(s) IV Push every 8 hours PRN Nausea and/or Vomiting  senna 2 Tablet(s) Oral at bedtime PRN Constipation

## 2023-06-15 NOTE — BH CONSULTATION LIAISON ASSESSMENT NOTE - MSE UNSTRUCTURED FT
Patient received Ativan prior to assessment and was sleeping during encounter. Data obtained from patient's daughter. Defer MSE.

## 2023-06-15 NOTE — BH CONSULTATION LIAISON ASSESSMENT NOTE - SUMMARY
SM is a 64 yo woman admitted from [facility] ED for seizures presenting to psychiatry for depression and anxiety. Given that patient was asleep for encounter and history was gathered via daughter, a repeat encounter is required to fully characterize her presentation.     It is not uncommon for patients with epilepsy to suffer from comorbid anxiety/depression. Per daughter's history, patient has a history of anxiety surrounding her son and his future that has impacted her ability to function. She is reportedly more relaxed in the hospital. She appears to be low acute psychiatric risk, but may benefit from continued assessment for anxiety vs. depression vs. panic disorder given her reported history of panic episodes. Further, patient's daughter states that although the patient speaks and understands English, her native language is [place] and she may be more effective communicating via Sky when sedated/medicated. As patient is Buddhist, can consider offering  services.     Plan  -consider offering  services (Sky)   -consider offering language services (Sky)   -consider offering Ativan PRN 1mg for anxiety/agitation SM is a 62 yo woman admitted from [facility] ED for seizures presenting to psychiatry for depression and anxiety. Given that patient was asleep for encounter and history was gathered via daughter, a repeat encounter is required to fully characterize her presentation.     It is not uncommon for patients with epilepsy to suffer from comorbid anxiety/depression. Per daughter's history, patient has a history of anxiety surrounding her son and his future that has impacted her ability to function. She is reportedly more relaxed in the hospital. She appears to be low acute psychiatric risk, but may benefit from continued assessment for anxiety vs. depression vs. panic disorder given her reported history of panic episodes. Further, patient's daughter states that although the patient speaks and understands English, her native language is [place] and she may be more effective communicating via Sky when sedated/medicated. As patient is Anabaptist, can consider offering  services.     Plan  -consider offering  services (Sky)   -consider offering language services (Sky)   -consider offering Ativan PRN 1mg for anxiety/agitation  -medical mgmt as you are; will reassess role for psychopharmacological intervention after resolution of seizures/delirium

## 2023-06-15 NOTE — PROGRESS NOTE ADULT - PROBLEM SELECTOR PLAN 1
- presents with seizure activity in ED triage for > 1 min, was being weaned off Keppra as outpatient as outpatient, taking 500mg qhs   - neuro consulted, received 2g BID  -c/w Keppra 1500mg bid  - vEEG + seizures  - an additional dose of Keppra 1500mg IVP x 1 given  - lamictal increased from 25mg qd  to 100mg bid  - lactate 12.8 on admission, now improved to 1.6, likely elevated in setting   - CTH negative for stroke, hemorrhage with no masses or lesions   - aspiration, seizure precautions  -F/U Neurology recs  -Patient says stress is causing depression and is ok with seeing a psychiatrist about it. Psychiatry consulted - presents with seizure activity in ED triage for > 1 min, was being weaned off Keppra as outpatient as outpatient, taking 500mg qhs   - neuro consulted, received 2g BID  -c/w Keppra 1500mg bid  - vEEG + seizures  - an additional dose of Keppra 1500mg IVP x 1 given  - lamictal increased from 25mg qd  to 100mg bid  - lactate 12.8 on admission, now improved to 1.6, likely elevated in setting   - CTH negative for stroke, hemorrhage with no masses or lesions   -check MRI brain r/o structural seizure focus  - aspiration, seizure precautions  -F/U Neurology recs  -Patient says stress is causing depression and is ok with seeing a psychiatrist about it. Psychiatry consulted

## 2023-06-15 NOTE — BH CONSULTATION LIAISON ASSESSMENT NOTE - HPI (INCLUDE ILLNESS QUALITY, SEVERITY, DURATION, TIMING, CONTEXT, MODIFYING FACTORS, ASSOCIATED SIGNS AND SYMPTOMS)
Patient chart reviewed. KACIE is a 63 year old woman who was admitted from [facility] ED on 6/13 for seizures. Psychiatry was consulted for anxiety and depression. Encounter completed at patient bedside with data obtained from patient daughter Pema, who is present and speaks to the patient every day on the phone. Per patient daughter, KACIE is anxious at baseline but has been doing better since being in the hospital. Her daughter believes that much of KACIE's anxiety draws from worrying about her son (28yo), whom she lives with. He has a history of OCD and SM spends a lot of her time worrying about his future. Per daughter, son and patient have a long history of conflict.   Following the death of her  2 years ago, SM saw a psychiatrist for episodes of dizziness and numbness in the hands which she attributed to her daily Keppra. The daughter described these episodes as panic attacks, which they managed by decreasing her morning Keppra dose. She took 500mg Keppra PO BID until 2 years ago when they decreased the morning Keppra dose to 250mg. She further decreased her Keppra dose to 500mg once/day, as she believed her "panic attacks" were due to the medication. She additionally worked with the psychiatrist for anxiety and depression, which was attributed to grieving the loss of her . She did work on coping strategies (ie breathing exercises) at that time, but did not trial medication. There is no other known treatment for anxiety or depression. Recently, daughter noticed that patient has been more stressed out. She endorses anhedonia and difficulty concentrating. No known changes to sleep, no known psychomotor changes, no known feelings of excessive guilt, no known history of suicidal ideation. She has trouble controlling her worrying, which is generally centered around her son. The worrying has interfered with her work and ability to function. Denies muscle tension, insomnia. SM does not smoke or use any other substances. Patient's daughter denies any known history of paranoia, history of visual hallucinations, or auditory hallucination. She is [employer] and practices [employer]; she is potentially interested in  services. Daughter notes that while KACIE has been sleeping a lot in the hospital, she has benefitted from having the opportunity to speak Sky with one of the aids. While KCAIE does understand English, she believes that the patient would benefit from opportunities to communicate in Sky to better communicate her full story.

## 2023-06-15 NOTE — BH CONSULTATION LIAISON ASSESSMENT NOTE - DIFFERENTIAL
Generalized anxiety disorder  major depressive disorder  Panic disorder Generalized anxiety disorder  major depressive disorder  Panic disorder  delirium (current/acute)

## 2023-06-15 NOTE — CHART NOTE - NSCHARTNOTEFT_GEN_A_CORE
EEG reviewed until time of this note.     2 seizures at 758A and 08:05A in the morning. No clear clinica correlation. Multiple push button events in the afternoon without clear EEG ictal change.    Full report will be available tomorrow.

## 2023-06-15 NOTE — PROGRESS NOTE ADULT - ASSESSMENT
63y (1960) woman, Sky speaking, PMH HTN on losartan, epilepsy on Keppra, who presents to [facility] ED for seizure. Had 60 seconds GTC at home with urinary incontinence. Takes home Keppra inconsistently, supposed to take 500 mg bid but has been taking 500 mg in AM. Sees Dr. Mara Clancy at outpatient neurology. Neuro exam stable. Overnight EEG showed 7 electrographic seizures from the posterior parietal head region (possibly left > right),  Last clear seizure at 07:58 06/15/23.    Impression: Breakthrough seizure, likely due to medication non-adherence vs new epileptiform activities    Recommendations:  [] Continue vEEG monitoring  [x] Ativan 4 mg IV stat given  [x] Vimpat 200 mg load once given  [x] Keppra 1500 mg load once given  [] Start maintenance vimpat 100 mg BID   [] C/w Keppra 1500 mg BID and Lamictal 25 mg bid   [] Neurocheck and vital q4  [] if patient has a convulsion, please document accurately the length of the episode, and specifically what the patient was doing paying attention to eye opening vs closure, gaze deviation, shaking of extremities, tongue bite, urinary incontinence, any derangement of vital signs  [] If patient has a generalized tonic clonic episode for >3 minutes or significant derangement of vital signs may administer Ativan 1mg IV and call [phone] for further reocmmendations  [] advise patient to not drive, operate heavy machinery, avoid heights, pools, bathtubs, locked doors.  [] will f/u outpatient upon discharge with Dr. Clancy    Case seen and discussed with general neurology attending Dr. Parks 63y (1960) woman, Sky speaking, PMH HTN on losartan, epilepsy on Keppra, who presents to [facility] ED for seizure. Had 60 seconds GTC at home with urinary incontinence. Takes home Keppra inconsistently, supposed to take 500 mg bid but has been taking 500 mg in AM. Sees Dr. Mara Clancy at outpatient neurology. Neuro exam stable. Overnight EEG showed 7 electrographic seizures from the posterior parietal head region (possibly left > right),  Last clear seizure at 07:58 06/15/23.    Impression: Status epilepticus is due to the subtherapeutic antiepileptic medication    Recommendations:  [] Continue vEEG monitoring  [x] Ativan 4 mg IV stat given  [x] Vimpat 200 mg load once given  [x] Keppra 1500 mg load once given  [] Check EKG today to monitor IL interval while on vimpat  [] Start maintenance vimpat 100 mg BID   [] C/w Keppra 1500 mg BID and Lamictal 25 mg bid   [] Neurocheck and vital q4  [] if patient has a convulsion, please document accurately the length of the episode, and specifically what the patient was doing paying attention to eye opening vs closure, gaze deviation, shaking of extremities, tongue bite, urinary incontinence, any derangement of vital signs  [] If patient has a generalized tonic clonic episode for >3 minutes or significant derangement of vital signs may administer Ativan 1mg IV and call [phone] for further reocmmendations  [] advise patient to not drive, operate heavy machinery, avoid heights, pools, bathtubs, locked doors.  [] will f/u outpatient upon discharge with Dr. Clancy    Case seen and discussed with general neurology attending Dr. Parks 63y (1960) woman, Sky speaking, PMH HTN on losartan, epilepsy on Keppra, who presents to [facility] ED for seizure. Had 60 seconds GTC at home with urinary incontinence. Takes home Keppra inconsistently, supposed to take 500 mg bid but has been taking 500 mg in AM. Sees Dr. Mara Clancy at outpatient neurology. Neuro exam stable. Overnight EEG showed 7 electrographic seizures from the posterior parietal head region (possibly left > right),  Last clear seizure at 07:58 06/15/23.    Impression: Status epilepticus is due to the subtherapeutic antiepileptic medication    Recommendations:  [] Continue vEEG monitoring  [] MRI brain w/wo epilepsy protocol  [x] Ativan 4 mg IV stat given  [x] Vimpat 200 mg load once given  [x] Keppra 1500 mg load once given  [] Check EKG today to monitor VA interval while on vimpat  [] Start maintenance vimpat 100 mg BID   [] C/w Keppra 1500 mg BID and Lamictal 25 mg bid   [] Neurocheck and vital q4  [] if patient has a convulsion, please document accurately the length of the episode, and specifically what the patient was doing paying attention to eye opening vs closure, gaze deviation, shaking of extremities, tongue bite, urinary incontinence, any derangement of vital signs  [] If patient has a generalized tonic clonic episode for >3 minutes or significant derangement of vital signs may administer Ativan 1mg IV and call [phone] for further reocmmendations  [] advise patient to not drive, operate heavy machinery, avoid heights, pools, bathtubs, locked doors.  [] will f/u outpatient upon discharge with Dr. Clancy    Case seen and discussed with general neurology attending Dr. Parks

## 2023-06-15 NOTE — PROGRESS NOTE ADULT - ASSESSMENT
63 y.o. Female  w/ hx  seizure disorder, HTN who presents after seizure. Found to have >1 minute seizure in ED triage. Restarted on Keppra. Admitted for seizure treatment and management.. Patient currently with +EEG findings for seizures so antiseizure meds being uptitrated.

## 2023-06-15 NOTE — PROGRESS NOTE ADULT - SUBJECTIVE AND OBJECTIVE BOX
Neurology - Progress Note    -  Spectra: [phone] ([facility]), [phone] ([facility])  -    HPI: Patient SUSHMA MAINI is a 63y (1960)  Sky speaking woman wiht PMH HTN on losartan, epilepsy on Keppra, who presents to [facility] ED for seizure. Patient was feeling as though she was about to have a seizure all day. Daughter brought her to hospital. Patient had 60 second GTC in triage. She had a post ictal period. Last seizure was 4 years ago per family.     Interval History:   Patient awake and alert, sitting up in bed. No acute distress. Patient on vEEG. Daughter at bed side, updated.      Review of Systems:   All other review of systems is negative unless indicated above.    Allergies:  penicillin (Unknown)      PMHx/PSHx/Family Hx: As above, otherwise see below   Seizure    Hypertension        Social Hx:  No current use of tobacco, alcohol, or illicit drugs      Medications:  MEDICATIONS  (STANDING):  enoxaparin Injectable 40 milliGRAM(s) SubCutaneous every 24 hours  lacosamide IVPB 100 milliGRAM(s) IV Intermittent every 12 hours  lamoTRIgine 25 milliGRAM(s) Oral two times a day  levETIRAcetam  IVPB 1500 milliGRAM(s) IV Intermittent every 12 hours  losartan 25 milliGRAM(s) Oral daily  polyethylene glycol 3350 17 Gram(s) Oral at bedtime    MEDICATIONS  (PRN):  acetaminophen     Tablet .. 650 milliGRAM(s) Oral every 6 hours PRN Temp greater or equal to 38C (100.4F), Mild Pain (1 - 3)  aluminum hydroxide/magnesium hydroxide/simethicone Suspension 30 milliLiter(s) Oral every 4 hours PRN Dyspepsia  LORazepam   Injectable 1 milliGRAM(s) IV Push every 6 hours PRN seizure activity  melatonin 3 milliGRAM(s) Oral at bedtime PRN Insomnia  ondansetron Injectable 4 milliGRAM(s) IV Push every 8 hours PRN Nausea and/or Vomiting  senna 2 Tablet(s) Oral at bedtime PRN Constipation      Vitals:  T(C): 36.6 (06-15-23 @ 11:17), Max: 37.2 (06-14-23 @ 13:15)  HR: 80 (06-15-23 @ 11:17) (76 - 86)  BP: 133/75 (06-15-23 @ 11:17) (124/64 - 140/67)  RR: 18 (06-15-23 @ 11:17) (18 - 18)  SpO2: 95% (06-15-23 @ 11:17) (95% - 98%)    Neurologic Exam:  Mental status - Awake and alert. Oriented to person, place, time. Speech fluent. Follows simple commands.      Cranial nerves - Pupils round and reactive to light, asymmetric, EOMI, face sensation (V1-V3) intact b/l, facial strength intact without asymmetry b/l    Motor - Normal bulk and tone throughout. No pronator drift. 5/5 throughout all extremities    Sensation - Light touch  intact throughout    Coordination - Finger to Nose intact b/l. No tremors appreciated    Gait and station - deferred for patient safety    Labs:                        11.5   6.09  )-----------( 174      ( 15 Braxton 2023 06:25 )             34.7     06-15    138  |  105  |  13  ----------------------------<  96  3.8   |  20<L>  |  0.92    Ca    8.8      15 Braxton 2023 06:25  Phos  3.6     06-15  Mg     2.20     06-15    TPro  6.5  /  Alb  3.6  /  TBili  0.3  /  DBili  x   /  AST  17  /  ALT  15  /  AlkPhos  94  06-14    CAPILLARY BLOOD GLUCOSE        LIVER FUNCTIONS - ( 14 Jun 2023 06:35 )  Alb: 3.6 g/dL / Pro: 6.5 g/dL / ALK PHOS: 94 U/L / ALT: 15 U/L / AST: 17 U/L / GGT: x             Radiology:  CT Head No Cont:  (13 Jun 2023 01:23)  No hydrocephalus,mass effect, midline shift, acute intracranial   hemorrhage, or brain edema.      EEG 6/14:  Clinical Impression:  At least 11 poorly lateralized electrographic seizures from the posterior head region captured as described, Last clear seizure at 4:46 06/14/23.   Evidence for left posterior temporal cerebral dysfunction    EEG 6/15  At least 7 electrographic seizures from the posterior parietal head region (possibly left > right) captured as described, Last clear seizure at 07:58 06/15/23. Neurology made aware.   Evidence for left posterior temporal cerebral dysfunction

## 2023-06-15 NOTE — EEG REPORT - NS EEG TEXT BOX
MAINI, SUSHMA [id number]     Study Date: 		06-14-23 08:00 to 06-15-23 08:00  Duration: 24  --------------------------------------------------------------------------------------------------  History:  CC/ HPI Patient is a 63y old  Female who presents with a chief complaint of seizures (13 Jun 2023 17:40)    MEDICATIONS  (STANDING):  enoxaparin Injectable 40 milliGRAM(s) SubCutaneous every 24 hours  lamoTRIgine 25 milliGRAM(s) Oral two times a day  levETIRAcetam  IVPB 1500 milliGRAM(s) IV Intermittent every 12 hours  losartan 25 milliGRAM(s) Oral daily    --------------------------------------------------------------------------------------------------  Study Interpretation:    [[[Abbreviation Key:  PDR=alpha rhythm/posterior dominant rhythm. A-P=anterior posterior.  Amplitude: ‘very low’:<20; ‘low’:20-49; ‘medium’:; ‘high’:>150uV.  Persistence for periodic/rhythmic patterns (% of epoch) ‘rare’:<1%; ‘occasional’:1-10%; ‘frequent’:10-50%; ‘abundant’:50-90%; ‘continuous’:>90%.  Persistence for sporadic discharges: ‘rare’:<1/hr; ‘occasional’:1/min-1/hr; ‘frequent’:>1/min; ‘abundant’:>1/10 sec.  RPP=rhythmic and periodic patterns; GRDA=generalized rhythmic delta activity; FIRDA=frontal intermittent GRDA; LRDA=lateralized rhythmic delta activity; TIRDA=temporal intermittent rhythmic delta activity;  LPD=PLED=lateralized periodic discharges; GPD=generalized periodic discharges; BIPDs =bilateral independent periodic discharges; Mf=multifocal; SIRPDs=stimulus induced rhythmic, periodic, or ictal appearing discharges; BIRDs=brief potentially ictal rhythmic discharges >4 Hz, lasting .5-10s; PFA (paroxysmal bursts >13 Hz or =8 Hz <10s).  Modifiers: +F=with fast component; +S=with spike component; +R=with rhythmic component.  S-B=burst suppression pattern.  Max=maximal. N1-drowsy; N2-stage II sleep; N3-slow wave sleep. SSS/BETS=small sharp spikes/benign epileptiform transients of sleep. HV=hyperventilation; PS=photic stimulation]]]    Daily EEG Visual Analysis    FINDINGS:      Background:  Continuous: continuous  Symmetry: symmetric  PDR: 9 Hz activity, with amplitude to 40 uV, that attenuated to eye opening.  Low amplitude frontal beta noted in wakefulness.  Reactivity: present  Voltage: normal, mostly 20-150uV  Anterior Posterior Gradient: present  Other background findings: none  Breach: absent    Background Slowing:  Generalized slowing: none was present.  Focal slowing: Intermittent left posterior temporal delta    State Changes:   -Drowsiness noted with increased slowing, attenuation of fast activity, vertex transients.  -Present with N2 sleep transients with symmetric spindles and K-complexes.    Sporadic Epileptiform Discharges:    None    Rhythmic and Periodic Patterns (RPPs):  None     Electrographic and Electroclinical seizures:  At least 7 electrographic seizures captured. Seizures appear to originate from bilateral (left > right) parietal regions. Seizures characterized by initial sharply contoured delta activity from the posterior parietal head region (P7/P3 > P8/P4) that evolves to rhythmic theta activity, with rapid generalization. Seizure duration was 3-5mins. There was no clear clinical correlation. Last clear seizure at 07:58 06/15/23.     Other Clinical Events:  None    Activation Procedures:   -Hyperventilation was not performed.    -Photic stimulation was not performed.    Artifacts:  Intermittent myogenic and movement artifacts were noted.    ECG:  The heart rate on single channel ECG was predominantly between 70-80 BPM.    EEG Classification / Summary:  Abnormal  EEG in the awake / drowsy / asleep state(s).    At least 7 electrographic seizures from the posterior parietal head region (possibly left > right) captured as described, Last clear seizure at 07:58 06/15/23.   Intermittent left posterior temporal slowing    Clinical Impression:    At least 7 electrographic seizures from the posterior parietal head region (possibly left > right) captured as described, Last clear seizure at 07:58 06/15/23. Neurology made aware.   Evidence for left posterior temporal cerebral dysfunction    This is a prelim report only, pending review with attending prior to finalization.    -------------------------------------------------------------------------------------------------------  [facility] EEG Reading Room Ph#: [phone]  Epilepsy Answering Service after 5PM and before 8:30AM: Ph#: [phone]    Shay Mancera M.D.   Epilepsy fellow     MAINI, SUSHMA [id number]     Study Date: 		06-14-23 08:00 to 06-15-23 08:00  Duration: 24  --------------------------------------------------------------------------------------------------  History:  CC/ HPI Patient is a 63y old  Female who presents with a chief complaint of seizures (13 Jun 2023 17:40)    MEDICATIONS  (STANDING):  enoxaparin Injectable 40 milliGRAM(s) SubCutaneous every 24 hours  lamoTRIgine 25 milliGRAM(s) Oral two times a day  levETIRAcetam  IVPB 1500 milliGRAM(s) IV Intermittent every 12 hours  losartan 25 milliGRAM(s) Oral daily    --------------------------------------------------------------------------------------------------  Study Interpretation:    [[[Abbreviation Key:  PDR=alpha rhythm/posterior dominant rhythm. A-P=anterior posterior.  Amplitude: ‘very low’:<20; ‘low’:20-49; ‘medium’:; ‘high’:>150uV.  Persistence for periodic/rhythmic patterns (% of epoch) ‘rare’:<1%; ‘occasional’:1-10%; ‘frequent’:10-50%; ‘abundant’:50-90%; ‘continuous’:>90%.  Persistence for sporadic discharges: ‘rare’:<1/hr; ‘occasional’:1/min-1/hr; ‘frequent’:>1/min; ‘abundant’:>1/10 sec.  RPP=rhythmic and periodic patterns; GRDA=generalized rhythmic delta activity; FIRDA=frontal intermittent GRDA; LRDA=lateralized rhythmic delta activity; TIRDA=temporal intermittent rhythmic delta activity;  LPD=PLED=lateralized periodic discharges; GPD=generalized periodic discharges; BIPDs =bilateral independent periodic discharges; Mf=multifocal; SIRPDs=stimulus induced rhythmic, periodic, or ictal appearing discharges; BIRDs=brief potentially ictal rhythmic discharges >4 Hz, lasting .5-10s; PFA (paroxysmal bursts >13 Hz or =8 Hz <10s).  Modifiers: +F=with fast component; +S=with spike component; +R=with rhythmic component.  S-B=burst suppression pattern.  Max=maximal. N1-drowsy; N2-stage II sleep; N3-slow wave sleep. SSS/BETS=small sharp spikes/benign epileptiform transients of sleep. HV=hyperventilation; PS=photic stimulation]]]    Daily EEG Visual Analysis    FINDINGS:      Background:  Continuous: continuous  Symmetry: symmetric  PDR: 9 Hz activity, with amplitude to 40 uV, that attenuated to eye opening.  Low amplitude frontal beta noted in wakefulness.  Reactivity: present  Voltage: normal, mostly 20-150uV  Anterior Posterior Gradient: present  Other background findings: none  Breach: absent    Background Slowing:  Generalized slowing: none was present.  Focal slowing: Intermittent left posterior temporal delta    State Changes:   -Drowsiness noted with increased slowing, attenuation of fast activity, vertex transients.  -Present with N2 sleep transients with symmetric spindles and K-complexes.    Sporadic Epileptiform Discharges:    None    Rhythmic and Periodic Patterns (RPPs):  None     Electrographic and Electroclinical seizures:  At least 7 electrographic seizures captured. Seizures appear to originate from bilateral (left > right) parietal regions. Seizures characterized by initial sharply contoured delta activity from the posterior parietal head region (P7/P3 > P8/P4) that evolves to rhythmic theta activity, with rapid generalization. A few of the seizures had intermittent cessation (<10 second) before continuation of theta activity. Seizure duration was 3-5mins. There was no clear clinical correlation. Last clear seizure at 07:58 06/15/23.     Other Clinical Events:  None    Activation Procedures:   -Hyperventilation was not performed.    -Photic stimulation was not performed.    Artifacts:  Intermittent myogenic and movement artifacts were noted.    ECG:  The heart rate on single channel ECG was predominantly between 70-80 BPM.    EEG Classification / Summary:  Abnormal  EEG in the awake / drowsy / asleep state(s).    At least 7 electrographic seizures from the posterior parietal head region (possibly left > right) captured as described, Last clear seizure at 07:58 06/15/23.   Intermittent left posterior temporal slowing    Clinical Impression:    At least 7 electrographic seizures from the posterior parietal head region (possibly left > right) captured as described, Last clear seizure at 07:58 06/15/23. Neurology made aware.   Evidence for left posterior temporal cerebral dysfunction    This is a prelim report only, pending review with attending prior to finalization.    -------------------------------------------------------------------------------------------------------  [facility] EEG Reading Room Ph#: [phone]  Epilepsy Answering Service after 5PM and before 8:30AM: Ph#: [phone]    Shay Mancera M.D.   Epilepsy fellow     MAINI, SUSHMA [id number]     Study Date: 		06-14-23 08:00 to 06-15-23 08:00  Duration: 24  --------------------------------------------------------------------------------------------------  History:  CC/ HPI Patient is a 63y old  Female who presents with a chief complaint of seizures (13 Jun 2023 17:40)    MEDICATIONS  (STANDING):  enoxaparin Injectable 40 milliGRAM(s) SubCutaneous every 24 hours  lamoTRIgine 25 milliGRAM(s) Oral two times a day  levETIRAcetam  IVPB 1500 milliGRAM(s) IV Intermittent every 12 hours  losartan 25 milliGRAM(s) Oral daily    --------------------------------------------------------------------------------------------------  Study Interpretation:    [[[Abbreviation Key:  PDR=alpha rhythm/posterior dominant rhythm. A-P=anterior posterior.  Amplitude: ‘very low’:<20; ‘low’:20-49; ‘medium’:; ‘high’:>150uV.  Persistence for periodic/rhythmic patterns (% of epoch) ‘rare’:<1%; ‘occasional’:1-10%; ‘frequent’:10-50%; ‘abundant’:50-90%; ‘continuous’:>90%.  Persistence for sporadic discharges: ‘rare’:<1/hr; ‘occasional’:1/min-1/hr; ‘frequent’:>1/min; ‘abundant’:>1/10 sec.  RPP=rhythmic and periodic patterns; GRDA=generalized rhythmic delta activity; FIRDA=frontal intermittent GRDA; LRDA=lateralized rhythmic delta activity; TIRDA=temporal intermittent rhythmic delta activity;  LPD=PLED=lateralized periodic discharges; GPD=generalized periodic discharges; BIPDs =bilateral independent periodic discharges; Mf=multifocal; SIRPDs=stimulus induced rhythmic, periodic, or ictal appearing discharges; BIRDs=brief potentially ictal rhythmic discharges >4 Hz, lasting .5-10s; PFA (paroxysmal bursts >13 Hz or =8 Hz <10s).  Modifiers: +F=with fast component; +S=with spike component; +R=with rhythmic component.  S-B=burst suppression pattern.  Max=maximal. N1-drowsy; N2-stage II sleep; N3-slow wave sleep. SSS/BETS=small sharp spikes/benign epileptiform transients of sleep. HV=hyperventilation; PS=photic stimulation]]]    Daily EEG Visual Analysis    FINDINGS:      Background:  Continuous: continuous  Symmetry: symmetric  PDR: 9 Hz activity, with amplitude to 40 uV, that attenuated to eye opening.  Low amplitude frontal beta noted in wakefulness.  Reactivity: present  Voltage: normal, mostly 20-150uV  Anterior Posterior Gradient: present  Other background findings: none  Breach: absent    Background Slowing:  Generalized slowing: none was present.  Focal slowing: Intermittent left posterior temporal delta    State Changes:   -Drowsiness noted with increased slowing, attenuation of fast activity, vertex transients.  -Present with N2 sleep transients with symmetric spindles and K-complexes.    Sporadic Epileptiform Discharges:    None    Rhythmic and Periodic Patterns (RPPs):  None     Electrographic and Electroclinical seizures:  At least 7 electrographic seizures captured. Seizures appear to originate from bilateral (left > right) parietal regions. Seizures characterized by initial sharply contoured delta activity from the posterior parietal head region (P7/P3 > P8/P4) that evolves to rhythmic theta activity, with rapid generalization. A few of the seizures had intermittent cessation (<10 second) before continuation of theta activity. Seizure duration was 3-5mins. There was no clear clinical correlation. Last clear seizure at 07:58 06/15/23.     Other Clinical Events:  None    Activation Procedures:   -Hyperventilation was not performed.    -Photic stimulation was not performed.    Artifacts:  Intermittent myogenic and movement artifacts were noted.    ECG:  The heart rate on single channel ECG was predominantly between 70-80 BPM.    EEG Classification / Summary:  Abnormal  EEG in the awake / drowsy / asleep state(s).    At least 7 electrographic seizures from the posterior parietal head region (possibly left > right) captured as described, Last clear seizure at 07:58 06/15/23.   Intermittent left posterior temporal slowing    Clinical Impression:    At least 7 electrographic seizures from the posterior parietal head region (possibly left > right) captured as described, Last clear seizure at 07:58 06/15/23. Neurology made aware.   Evidence for left posterior temporal cerebral dysfunction    This is a prelim report only, pending review with attending prior to finalization.    -------------------------------------------------------------------------------------------------------  [facility] EEG Reading Room Ph#: [phone]  Epilepsy Answering Service after 5PM and before 8:30AM: Ph#: [phone]    Shay Mancera M.D.   Epilepsy fellow    Flavio Vasquez MD  Neurology Attending Physician

## 2023-06-15 NOTE — BH CONSULTATION LIAISON ASSESSMENT NOTE - RISK ASSESSMENT
Risk factors: anxiety    Protective factors: no current SIIP/HIIP, no h/o SA/SIB, no h/o psych admissions, no access to weapons, no active substance abuse, spirituality, domiciled, social supports, engaged in treatment, compliant with treatment, help-seeking behaviors, future-orientation      Overall, pt is at low acute risk of harm and does not meet criteria for psychiatric admission at this time.

## 2023-06-15 NOTE — PROGRESS NOTE ADULT - SUBJECTIVE AND OBJECTIVE BOX
Patient is a 63y old  Female who presents with a chief complaint of seizures (14 Jun 2023 14:11)      SUBJECTIVE / OVERNIGHT EVENTS:  Patient has no new complaints. EEG positive for 2 seizures. Denies cp, SOB, abdominal pain, N/V/D     MEDICATIONS  (STANDING):  enoxaparin Injectable 40 milliGRAM(s) SubCutaneous every 24 hours  lacosamide IVPB 100 milliGRAM(s) IV Intermittent every 12 hours  lamoTRIgine 25 milliGRAM(s) Oral two times a day  levETIRAcetam  IVPB 1500 milliGRAM(s) IV Intermittent every 12 hours  losartan 25 milliGRAM(s) Oral daily  polyethylene glycol 3350 17 Gram(s) Oral at bedtime    MEDICATIONS  (PRN):  acetaminophen     Tablet .. 650 milliGRAM(s) Oral every 6 hours PRN Temp greater or equal to 38C (100.4F), Mild Pain (1 - 3)  aluminum hydroxide/magnesium hydroxide/simethicone Suspension 30 milliLiter(s) Oral every 4 hours PRN Dyspepsia  LORazepam   Injectable 1 milliGRAM(s) IV Push every 6 hours PRN seizure activity  melatonin 3 milliGRAM(s) Oral at bedtime PRN Insomnia  ondansetron Injectable 4 milliGRAM(s) IV Push every 8 hours PRN Nausea and/or Vomiting  senna 2 Tablet(s) Oral at bedtime PRN Constipation      Vital Signs Last 24 Hrs  T(C): 36.6 (15 Braxton 2023 11:17), Max: 37.2 (14 Jun 2023 13:15)  T(F): 97.9 (15 Braxton 2023 11:17), Max: 98.9 (14 Jun 2023 13:15)  HR: 80 (15 Braxton 2023 11:17) (76 - 86)  BP: 133/75 (15 Braxton 2023 11:17) (124/64 - 140/67)  BP(mean): --  RR: 18 (15 Braxton 2023 11:17) (18 - 18)  SpO2: 95% (15 Braxton 2023 11:17) (95% - 98%)    Parameters below as of 15 Braxton 2023 11:17  Patient On (Oxygen Delivery Method): room air      CAPILLARY BLOOD GLUCOSE        I&O's Summary    14 Jun 2023 07:01  -  15 Braxton 2023 07:00  --------------------------------------------------------  IN: 925 mL / OUT: 0 mL / NET: 925 mL        PHYSICAL EXAM:  GENERAL: NAD, well-developed  HEAD:  Atraumatic, Normocephalic  EYES: EOMI, PERRLA, conjunctiva and sclera clear  NECK: Supple, No JVD  CHEST/LUNG: Clear to auscultation bilaterally; No wheeze  HEART: Regular rate and rhythm; No murmurs, rubs, or gallops  ABDOMEN: Soft, Nontender, Nondistended; Bowel sounds present  EXTREMITIES:  2+ Peripheral Pulses, No clubbing, cyanosis, or edema  PSYCH: AAOx3  NEUROLOGY: non-focal  SKIN: No rashes or lesions    LABS:                        11.5   6.09  )-----------( 174      ( 15 Braxton 2023 06:25 )             34.7     06-15    138  |  105  |  13  ----------------------------<  96  3.8   |  20<L>  |  0.92    Ca    8.8      15 Braxton 2023 06:25  Phos  3.6     06-15  Mg     2.20     06-15    TPro  6.5  /  Alb  3.6  /  TBili  0.3  /  DBili  x   /  AST  17  /  ALT  15  /  AlkPhos  94  06-14              RADIOLOGY & ADDITIONAL TESTS:    Imaging Personally Reviewed:    Consultant(s) Notes Reviewed:      Care Discussed with Consultants/Other Providers:

## 2023-06-16 LAB
ANION GAP SERPL CALC-SCNC: 13 MMOL/L — SIGNIFICANT CHANGE UP (ref 7–14)
BUN SERPL-MCNC: 12 MG/DL — SIGNIFICANT CHANGE UP (ref 7–23)
CALCIUM SERPL-MCNC: 9.1 MG/DL — SIGNIFICANT CHANGE UP (ref 8.4–10.5)
CHLORIDE SERPL-SCNC: 103 MMOL/L — SIGNIFICANT CHANGE UP (ref 98–107)
CO2 SERPL-SCNC: 23 MMOL/L — SIGNIFICANT CHANGE UP (ref 22–31)
CREAT SERPL-MCNC: 0.92 MG/DL — SIGNIFICANT CHANGE UP (ref 0.5–1.3)
EGFR: 70 ML/MIN/1.73M2 — SIGNIFICANT CHANGE UP
GLUCOSE SERPL-MCNC: 90 MG/DL — SIGNIFICANT CHANGE UP (ref 70–99)
HCT VFR BLD CALC: 34.3 % — LOW (ref 34.5–45)
HGB BLD-MCNC: 11.6 G/DL — SIGNIFICANT CHANGE UP (ref 11.5–15.5)
MAGNESIUM SERPL-MCNC: 2.2 MG/DL — SIGNIFICANT CHANGE UP (ref 1.6–2.6)
MCHC RBC-ENTMCNC: 28.9 PG — SIGNIFICANT CHANGE UP (ref 27–34)
MCHC RBC-ENTMCNC: 33.8 GM/DL — SIGNIFICANT CHANGE UP (ref 32–36)
MCV RBC AUTO: 85.3 FL — SIGNIFICANT CHANGE UP (ref 80–100)
NRBC # BLD: 0 /100 WBCS — SIGNIFICANT CHANGE UP (ref 0–0)
NRBC # FLD: 0 K/UL — SIGNIFICANT CHANGE UP (ref 0–0)
PHOSPHATE SERPL-MCNC: 3.7 MG/DL — SIGNIFICANT CHANGE UP (ref 2.5–4.5)
PLATELET # BLD AUTO: 186 K/UL — SIGNIFICANT CHANGE UP (ref 150–400)
POTASSIUM SERPL-MCNC: 4.1 MMOL/L — SIGNIFICANT CHANGE UP (ref 3.5–5.3)
POTASSIUM SERPL-SCNC: 4.1 MMOL/L — SIGNIFICANT CHANGE UP (ref 3.5–5.3)
RBC # BLD: 4.02 M/UL — SIGNIFICANT CHANGE UP (ref 3.8–5.2)
RBC # FLD: 13.1 % — SIGNIFICANT CHANGE UP (ref 10.3–14.5)
SODIUM SERPL-SCNC: 139 MMOL/L — SIGNIFICANT CHANGE UP (ref 135–145)
WBC # BLD: 5.94 K/UL — SIGNIFICANT CHANGE UP (ref 3.8–10.5)
WBC # FLD AUTO: 5.94 K/UL — SIGNIFICANT CHANGE UP (ref 3.8–10.5)

## 2023-06-16 PROCEDURE — 95718 EEG PHYS/QHP 2-12 HR W/VEEG: CPT

## 2023-06-16 PROCEDURE — 99232 SBSQ HOSP IP/OBS MODERATE 35: CPT

## 2023-06-16 PROCEDURE — 99233 SBSQ HOSP IP/OBS HIGH 50: CPT | Mod: GC

## 2023-06-16 RX ORDER — LACOSAMIDE 50 MG/1
100 TABLET ORAL ONCE
Refills: 0 | Status: DISCONTINUED | OUTPATIENT
Start: 2023-06-16 | End: 2023-06-16

## 2023-06-16 RX ORDER — LOSARTAN POTASSIUM 100 MG/1
50 TABLET, FILM COATED ORAL DAILY
Refills: 0 | Status: DISCONTINUED | OUTPATIENT
Start: 2023-06-17 | End: 2023-06-18

## 2023-06-16 RX ADMIN — LEVETIRACETAM 400 MILLIGRAM(S): 250 TABLET, FILM COATED ORAL at 18:17

## 2023-06-16 RX ADMIN — LOSARTAN POTASSIUM 25 MILLIGRAM(S): 100 TABLET, FILM COATED ORAL at 07:26

## 2023-06-16 RX ADMIN — LACOSAMIDE 120 MILLIGRAM(S): 50 TABLET ORAL at 09:04

## 2023-06-16 RX ADMIN — LAMOTRIGINE 25 MILLIGRAM(S): 25 TABLET, ORALLY DISINTEGRATING ORAL at 18:17

## 2023-06-16 RX ADMIN — POLYETHYLENE GLYCOL 3350 17 GRAM(S): 17 POWDER, FOR SOLUTION ORAL at 21:46

## 2023-06-16 RX ADMIN — LEVETIRACETAM 400 MILLIGRAM(S): 250 TABLET, FILM COATED ORAL at 07:26

## 2023-06-16 RX ADMIN — LAMOTRIGINE 25 MILLIGRAM(S): 25 TABLET, ORALLY DISINTEGRATING ORAL at 07:26

## 2023-06-16 RX ADMIN — LACOSAMIDE 120 MILLIGRAM(S): 50 TABLET ORAL at 19:09

## 2023-06-16 RX ADMIN — ENOXAPARIN SODIUM 40 MILLIGRAM(S): 100 INJECTION SUBCUTANEOUS at 18:17

## 2023-06-16 RX ADMIN — LACOSAMIDE 120 MILLIGRAM(S): 50 TABLET ORAL at 06:34

## 2023-06-16 NOTE — PROGRESS NOTE ADULT - SUBJECTIVE AND OBJECTIVE BOX
Macy Lee  Formerly [facility] Medicine  Pager #[phone]  Available on [employer] Teams    Patient is a 63y old  Female who presents with a chief complaint of seizures (16 Jun 2023 08:02)      SUBJECTIVE / OVERNIGHT EVENTS: Patient seen and examined at bedside. Pt's daughter and son at bedside. EEG leads removed. Pt reports feeling fine, yesterday morning around the time of her seizures she had an aura with a strange sensation in her arms.    ADDITIONAL REVIEW OF SYSTEMS:    MEDICATIONS  (STANDING):  enoxaparin Injectable 40 milliGRAM(s) SubCutaneous every 24 hours  lacosamide IVPB 100 milliGRAM(s) IV Intermittent every 12 hours  lamoTRIgine 25 milliGRAM(s) Oral two times a day  levETIRAcetam  IVPB 1500 milliGRAM(s) IV Intermittent every 12 hours  losartan 25 milliGRAM(s) Oral daily  polyethylene glycol 3350 17 Gram(s) Oral at bedtime    MEDICATIONS  (PRN):  acetaminophen     Tablet .. 650 milliGRAM(s) Oral every 6 hours PRN Temp greater or equal to 38C (100.4F), Mild Pain (1 - 3)  aluminum hydroxide/magnesium hydroxide/simethicone Suspension 30 milliLiter(s) Oral every 4 hours PRN Dyspepsia  LORazepam     Tablet 1 milliGRAM(s) Oral daily PRN Anxiety/AGITATION  LORazepam   Injectable 1 milliGRAM(s) IV Push every 6 hours PRN seizure activity  melatonin 3 milliGRAM(s) Oral at bedtime PRN Insomnia  ondansetron Injectable 4 milliGRAM(s) IV Push every 8 hours PRN Nausea and/or Vomiting  senna 2 Tablet(s) Oral at bedtime PRN Constipation      CAPILLARY BLOOD GLUCOSE        I&O's Summary    15 Braxton 2023 07:01  -  16 Jun 2023 07:00  --------------------------------------------------------  IN: 720 mL / OUT: 0 mL / NET: 720 mL        PHYSICAL EXAM:    Vital Signs Last 24 Hrs  T(C): 36.7 (16 Jun 2023 10:55), Max: 37 (16 Jun 2023 02:05)  T(F): 98 (16 Jun 2023 10:55), Max: 98.6 (16 Jun 2023 02:05)  HR: 69 (16 Jun 2023 10:55) (69 - 100)  BP: 149/62 (16 Jun 2023 10:55) (129/74 - 149/62)  BP(mean): --  RR: 18 (16 Jun 2023 10:55) (18 - 19)  SpO2: 95% (16 Jun 2023 10:55) (95% - 98%)    Parameters below as of 16 Jun 2023 10:55  Patient On (Oxygen Delivery Method): room air      CONSTITUTIONAL: NAD, well-developed, well-groomed  EYES: Conjunctiva and sclera clear  ENMT: Moist oral mucosa  RESPIRATORY: Normal respiratory effort; lungs are clear to auscultation bilaterally  CARDIOVASCULAR: Regular rate and rhythm, normal S1 and S2, no murmur/rub/gallop; No lower extremity edema  ABDOMEN: Soft, nontender to palpation, normoactive bowel sounds  PSYCH: A+O to person, place, and time; affect appropriate  NEUROLOGY: No focal deficits  SKIN: No rashes; no palpable lesions    LABS:                        11.6   5.94  )-----------( 186      ( 16 Jun 2023 06:27 )             34.3     06-16    139  |  103  |  12  ----------------------------<  90  4.1   |  23  |  0.92    Ca    9.1      16 Jun 2023 06:27  Phos  3.7     06-16  Mg     2.20     06-16      RADIOLOGY & ADDITIONAL TESTS: No new imaging  Results Reviewed: Yes  Imaging Personally Reviewed:  Electrocardiogram Personally Reviewed:    COORDINATION OF CARE:  Care Discussed with Consultants/Other Providers [Y/N]: Yes, neurology resident- continue current AEDs, pending MRI head  Prior or Outpatient Records Reviewed [Y/N]:

## 2023-06-16 NOTE — PROGRESS NOTE ADULT - ASSESSMENT
63y (1960) woman, Sky speaking, PMH HTN on losartan, epilepsy on Keppra, who presents to [facility] ED for seizure. Had 60 seconds GTC at home with urinary incontinence. Takes home Keppra inconsistently, supposed to take 500 mg bid but has been taking 500 mg in AM. Sees Dr. Mara Clancy at outpatient neurology. Exam normal.     Impression:  Rfractory status epilepticus is due to the subtherapeutic antiepileptic medication      Recommendation:   [] Continue vEEG monitoring  [] MRI brain w/wo epilepsy protocol once patient is stable and remains seizure free  [x] On 6/15/23 s/p: Ativan 4 mg IV, Vimpat 200 mg load once, Keppra 1500 mg load  [] Continue vimpat 100 mg BID   [] Continue Keppra 1500 mg BID   [] Continue Lamictal 25 mg bid   [] Neurocheck and vital q4  [] if patient has a convulsion, please document accurately the length of the episode, and specifically what the patient was doing paying attention to eye opening vs closure, gaze deviation, shaking of extremities, tongue bite, urinary incontinence, any derangement of vital signs  [] If patient has a generalized tonic clonic episode for >3 minutes or significant derangement of vital signs may administer Ativan 1mg IV and call [phone] for further reocmmendations  [] advise patient to not drive, operate heavy machinery, avoid heights, pools, bathtubs, locked doors.  [] will f/u outpatient upon discharge with Dr. Clancy    To be seen and discussed with attending.  63y (1960) woman, Sky speaking, PMH HTN on losartan, epilepsy on Keppra, who presents to [facility] ED for seizure. Had 60 seconds GTC at home with urinary incontinence. Takes home Keppra inconsistently, supposed to take 500 mg bid but has been taking 500 mg in AM. Sees Dr. Mara Clancy at outpatient neurology. Exam normal. Prelim EEG reports 2 electrographic seizures from the posterior parietal head region (left > right) captured as described, Last clear seizure at 08:04 06/16/23. Evidence for left posterior temporal cerebral dysfunction. Multiple push button events that are non-epileptic.       Impression:  Rfractory status epilepticus is due to the subtherapeutic antiepileptic medication      Recommendation:   [] Continue vEEG monitoring  [] MRI brain w/wo epilepsy protocol once patient is stable and remains seizure free  [x] On 6/15/23 s/p: Ativan 4 mg IV, Vimpat 200 mg load once, Keppra 1500 mg load  [] Continue vimpat 100 mg BID   [] Continue Keppra 1500 mg BID   [] Continue Lamictal 25 mg bid   [] Neurocheck and vital q4  [] if patient has a convulsion, please document accurately the length of the episode, and specifically what the patient was doing paying attention to eye opening vs closure, gaze deviation, shaking of extremities, tongue bite, urinary incontinence, any derangement of vital signs  [] If patient has a generalized tonic clonic episode for >3 minutes or significant derangement of vital signs may administer Ativan 1mg IV and call [phone] for further reocmmendations  [] advise patient to not drive, operate heavy machinery, avoid heights, pools, bathtubs, locked doors.  [] will f/u outpatient upon discharge with Dr. Clancy    To be seen and discussed with attending.  63y (1960) woman, Sky speaking, PMH HTN on losartan, epilepsy on Keppra, who presents to [facility] ED for seizure. Had 60 seconds GTC at home with urinary incontinence. Takes home Keppra inconsistently, supposed to take 500 mg bid but has been taking 500 mg in AM. Sees Dr. Mara Clancy at outpatient neurology. Exam normal. Prelim EEG reports 2 electrographic seizures from the posterior parietal head region (left > right) captured as described, Last clear seizure at 08:04 06/16/23. Evidence for left posterior temporal cerebral dysfunction. Multiple push button events that are non-epileptic.       Impression:  Refractory status epilepticus is due to the subtherapeutic antiepileptic medication      Recommendation:   [] Give extra dose Vimpat 100 mg IV now  [] Continue vEEG monitoring  [] MRI brain w/wo epilepsy protocol once patient is stable and remains seizure free  [x] On 6/15/23 s/p: Ativan 4 mg IV, Vimpat 200 mg load once, Keppra 1500 mg load  [] Continue vimpat 100 mg BID   [] Continue Keppra 1500 mg BID   [] Continue Lamictal 25 mg bid   [] Neurocheck and vital q4  [] if patient has a convulsion, please document accurately the length of the episode, and specifically what the patient was doing paying attention to eye opening vs closure, gaze deviation, shaking of extremities, tongue bite, urinary incontinence, any derangement of vital signs  [] If patient has a generalized tonic clonic episode for >3 minutes or significant derangement of vital signs may administer Ativan 1mg IV and call [phone] for further reocmmendations  [] advise patient to not drive, operate heavy machinery, avoid heights, pools, bathtubs, locked doors.  [] will f/u outpatient upon discharge with Dr. Clancy    To be seen and discussed with attending.  63y (1960) woman, Sky speaking, PMH HTN on losartan, epilepsy on Keppra, who presents to [facility] ED for seizure. Had 60 seconds GTC at home with urinary incontinence. Takes home Keppra inconsistently, supposed to take 500 mg bid but has been taking 500 mg in AM. Sees Dr. Mara Clancy at outpatient neurology. Exam normal. Prelim EEG reports 2 electrographic seizures from the posterior parietal head region (left > right) captured as described, Last clear seizure at 08:04 06/15/23. Evidence for left posterior temporal cerebral dysfunction. Multiple push button events that are non-epileptic.       Impression:  Refractory status epilepticus is due to the subtherapeutic antiepileptic medication      Recommendation:   [x] Give extra dose Vimpat 100 mg IV now  [] Discontinue vEEG monitoring: correction made to original EEG report, no seizures since 8 am 6/15 (over 24 hours)  [] MRI brain w/wo epilepsy protocol   [x] On 6/15/23 s/p: Ativan 4 mg IV, Vimpat 200 mg load once, Keppra 1500 mg load  [] Continue vimpat 100 mg BID   [] Continue Keppra 1500 mg BID   [] Continue Lamictal 25 mg bid   [] Neurocheck and vital q4  [] if patient has a convulsion, please document accurately the length of the episode, and specifically what the patient was doing paying attention to eye opening vs closure, gaze deviation, shaking of extremities, tongue bite, urinary incontinence, any derangement of vital signs  [] If patient has a generalized tonic clonic episode for >3 minutes or significant derangement of vital signs may administer Ativan 1mg IV and call [phone] for further reocmmendations  [] advise patient to not drive, operate heavy machinery, avoid heights, pools, bathtubs, locked doors.  [] Follow outpatient upon discharge at [address] [place] [address] with Dr. Flavio Vasquez     Seen and discussed with attending.  63y (1960) woman, Sky speaking, PMH HTN on losartan, epilepsy on Keppra, who presents to [facility] ED for seizure. Had 60 seconds GTC at home with urinary incontinence. Takes home Keppra inconsistently, supposed to take 500 mg bid but has been taking 500 mg in AM. Sees Dr. Mara Clancy at outpatient neurology. Exam normal. Prelim EEG reports 2 electrographic seizures from the posterior parietal head region (left > right) captured as described, Last clear seizure at 08:04 06/15/23. Evidence for left posterior temporal cerebral dysfunction. Multiple push button events that are non-epileptic.       Impression:  Refractory status epilepticus is due to the subtherapeutic antiepileptic medication      Recommendation:   [x] Give extra dose Vimpat 100 mg IV now  [x] Discontinue vEEG monitoring: correction made to original EEG report, no seizures since 8 am 6/15 (over 24 hours)  [] MRI brain w/wo epilepsy protocol   [x] On 6/15/23 s/p: Ativan 4 mg IV, Vimpat 200 mg load once, Keppra 1500 mg load  [] Continue vimpat 100 mg BID   [] Continue Keppra 1500 mg BID   [] Continue Lamictal 25 mg bid   [] Neurocheck and vital q4  [] PT/OT  [] if patient has a convulsion, please document accurately the length of the episode, and specifically what the patient was doing paying attention to eye opening vs closure, gaze deviation, shaking of extremities, tongue bite, urinary incontinence, any derangement of vital signs  [] If patient has a generalized tonic clonic episode for >3 minutes or significant derangement of vital signs may administer Ativan 1mg IV and call [phone] for further reocmmendations  [] advise patient to not drive, operate heavy machinery, avoid heights, pools, bathtubs, locked doors.  [] Follow outpatient upon discharge at [address] [place] [address] with Dr. Flavio Vasquez     Seen and discussed with attending.

## 2023-06-16 NOTE — PROGRESS NOTE ADULT - PROBLEM SELECTOR PLAN 1
- presents with seizure activity in ED triage for > 1 min, was being weaned off Keppra as outpatient as outpatient, taking 500mg qhs  - neuro following  - c/w Keppra 1500mg bid, Lamictal 25mg PO BID, Vimpat 100mg PO BID  - vEEG + seizures, last 2 seizures on 6/15 at 8 AM  - lactate 12.8 on admission, now improved to 1.6, likely elevated in setting of seizure  - CTH negative for stroke, hemorrhage with no masses or lesions   - check MRI brain r/o structural seizure focus  - aspiration, seizure precautions   -Patient says stress is causing depression and is ok with seeing a psychiatrist about it. Psychiatry consulted

## 2023-06-16 NOTE — PROGRESS NOTE ADULT - SUBJECTIVE AND OBJECTIVE BOX
Neurology - Progress Note    -  Spectra: [phone] ([facility]), [phone] ([facility])  -    HPI: Patient SUSHMA MAINI is a 63y (1960) Sky speaking woman wiht PMH HTN on losartan, epilepsy on Keppra, who presents to [facility] ED for seizure. Patient was feeling as though she was about to have a seizure all day. Daughter brought her to hospital. Patient had 60 second GTC in triage. She had a post ictal period. Last seizure was 4 years ago per family.     Interval History: Patient resting comfortably in bed. Daughter at bedside updated.     Review of Systems:   All other review of systems is negative unless indicated above.    Allergies:  penicillin (Unknown)      PMHx/PSHx/Family Hx: As above, otherwise see below   Seizure  Hypertension        Social Hx:  No current use of tobacco, alcohol, or illicit drugs    Medications:  MEDICATIONS  (STANDING):  enoxaparin Injectable 40 milliGRAM(s) SubCutaneous every 24 hours  lacosamide IVPB 100 milliGRAM(s) IV Intermittent every 12 hours  lamoTRIgine 25 milliGRAM(s) Oral two times a day  levETIRAcetam  IVPB 1500 milliGRAM(s) IV Intermittent every 12 hours  losartan 25 milliGRAM(s) Oral daily  polyethylene glycol 3350 17 Gram(s) Oral at bedtime    MEDICATIONS  (PRN):  acetaminophen     Tablet .. 650 milliGRAM(s) Oral every 6 hours PRN Temp greater or equal to 38C (100.4F), Mild Pain (1 - 3)  aluminum hydroxide/magnesium hydroxide/simethicone Suspension 30 milliLiter(s) Oral every 4 hours PRN Dyspepsia  LORazepam     Tablet 1 milliGRAM(s) Oral daily PRN Anxiety/AGITATION  LORazepam   Injectable 1 milliGRAM(s) IV Push every 6 hours PRN seizure activity  melatonin 3 milliGRAM(s) Oral at bedtime PRN Insomnia  ondansetron Injectable 4 milliGRAM(s) IV Push every 8 hours PRN Nausea and/or Vomiting  senna 2 Tablet(s) Oral at bedtime PRN Constipation      Vitals:  T(C): 37 (06-16-23 @ 02:05), Max: 37 (06-16-23 @ 02:05)  HR: 100 (06-16-23 @ 02:05) (80 - 100)  BP: 129/74 (06-16-23 @ 02:05) (129/74 - 139/89)  RR: 18 (06-16-23 @ 02:05) (18 - 19)  SpO2: 96% (06-16-23 @ 02:05) (95% - 98%)    Physical Examination:  General - NAD    Neurologic Exam:  Mental status - Awake, Alert, Oriented to person, place, and time). Speech clear, fluent. Follows simple and complex commands    Cranial nerves - PERRL, VFF, EOMI, facial strength intact without asymmetry b/l, palate with symmetric elevation, sternocleidomastiod 5/5 strength b/l, tongue midline on protrusion with full lateral movement    Motor - Normal bulk and tone throughout. No pronator drift.  Strength testing                Biceps      Triceps              R            5                 5               5                                                   L             5                 5               5                                                           Hip Flexion    Hip Extension        Dorsiflexion    Plantar Flexion  R              5                           5                       5                           5                           L              5                           5                        5                           5                               Sensation - Light touch intact throughout    DTR's -             Biceps      Triceps     Brachioradialis      Patellar    Ankle    Toes/plantar response  R             2+             2+                  2+                       2+            2+                 Down  L              2+             2+                 2+                        2+           2+                 Down    Coordination - Finger to Nose intact b/l. No tremors appreciated    Gait and station -Deffered due to fall/safety risk     Labs:                        11.5   6.09  )-----------( 174      ( 15 Braxton 2023 06:25 )             34.7     06-15    138  |  105  |  13  ----------------------------<  96  3.8   |  20<L>  |  0.92    Ca    8.8      15 Braxton 2023 06:25  Phos  3.6     06-15  Mg     2.20     06-15      Radiology:  CT Head No Cont:  (13 Jun 2023 01:23)  No hydrocephalus,mass effect, midline shift, acute intracranial   hemorrhage, or brain edema.      EEG 6/14:  Clinical Impression:  At least 11 poorly lateralized electrographic seizures from the posterior head region captured as described, Last clear seizure at 4:46 06/14/23.   Evidence for left posterior temporal cerebral dysfunction    EEG 6/15  At least 7 electrographic seizures from the posterior parietal head region (possibly left > right) captured as described, Last clear seizure at 07:58 06/15/23. Neurology made aware.   Evidence for left posterior temporal cerebral dysfunction   Neurology - Progress Note    -  Spectra: [phone] ([facility]), [phone] ([facility])  -    HPI: Patient SUSHMA MAINI is a 63y (1960) Sky speaking woman wiht PMH HTN on losartan, epilepsy on Keppra, who presents to [facility] ED for seizure. Patient was feeling as though she was about to have a seizure all day. Daughter brought her to hospital. Patient had 60 second GTC in triage. She had a post ictal period. Last seizure was 4 years ago per family.     Interval History: Patient resting comfortably in bed. Daughter at bedside updated.     Review of Systems:   All other review of systems is negative unless indicated above.    Allergies:  penicillin (Unknown)      PMHx/PSHx/Family Hx: As above, otherwise see below   Seizure  Hypertension        Social Hx:  No current use of tobacco, alcohol, or illicit drugs    Medications:  MEDICATIONS  (STANDING):  enoxaparin Injectable 40 milliGRAM(s) SubCutaneous every 24 hours  lacosamide IVPB 100 milliGRAM(s) IV Intermittent every 12 hours  lamoTRIgine 25 milliGRAM(s) Oral two times a day  levETIRAcetam  IVPB 1500 milliGRAM(s) IV Intermittent every 12 hours  losartan 25 milliGRAM(s) Oral daily  polyethylene glycol 3350 17 Gram(s) Oral at bedtime    MEDICATIONS  (PRN):  acetaminophen     Tablet .. 650 milliGRAM(s) Oral every 6 hours PRN Temp greater or equal to 38C (100.4F), Mild Pain (1 - 3)  aluminum hydroxide/magnesium hydroxide/simethicone Suspension 30 milliLiter(s) Oral every 4 hours PRN Dyspepsia  LORazepam     Tablet 1 milliGRAM(s) Oral daily PRN Anxiety/AGITATION  LORazepam   Injectable 1 milliGRAM(s) IV Push every 6 hours PRN seizure activity  melatonin 3 milliGRAM(s) Oral at bedtime PRN Insomnia  ondansetron Injectable 4 milliGRAM(s) IV Push every 8 hours PRN Nausea and/or Vomiting  senna 2 Tablet(s) Oral at bedtime PRN Constipation      Vitals:  T(C): 37 (06-16-23 @ 02:05), Max: 37 (06-16-23 @ 02:05)  HR: 100 (06-16-23 @ 02:05) (80 - 100)  BP: 129/74 (06-16-23 @ 02:05) (129/74 - 139/89)  RR: 18 (06-16-23 @ 02:05) (18 - 19)  SpO2: 96% (06-16-23 @ 02:05) (95% - 98%)    Physical Examination:  General - NAD    Neurologic Exam:  Mental status - Awake, Alert, Oriented to person, place, and time). Speech clear, fluent. Follows simple and complex commands    Cranial nerves - PERRL, VFF, EOMI, facial strength intact without asymmetry b/l, palate with symmetric elevation, sternocleidomastiod 5/5 strength b/l, tongue midline on protrusion with full lateral movement    Motor - Normal bulk and tone throughout. No pronator drift.  Strength testing                Biceps      Triceps              R            5                 5               5                                                   L             5                 5               5                                                           Hip Flexion    Hip Extension        Dorsiflexion    Plantar Flexion  R              5                           5                       5                           5                           L              5                           5                        5                           5                               Sensation - Light touch intact throughout    DTR's -             Biceps      Triceps     Brachioradialis      Patellar    Ankle    Toes/plantar response  R             2+             2+                  2+                       2+            2+                 Down  L              2+             2+                 2+                        2+           2+                 Down    Coordination - Finger to Nose intact b/l. No tremors appreciated    Gait and station -Deffered due to fall/safety risk     Labs:                        11.5   6.09  )-----------( 174      ( 15 Braxton 2023 06:25 )             34.7     06-15    138  |  105  |  13  ----------------------------<  96  3.8   |  20<L>  |  0.92    Ca    8.8      15 Braxton 2023 06:25  Phos  3.6     06-15  Mg     2.20     06-15      Radiology:  CT Head No Cont:  (13 Jun 2023 01:23)  No hydrocephalus,mass effect, midline shift, acute intracranial   hemorrhage, or brain edema.      EEG 6/14:  Clinical Impression:  At least 11 poorly lateralized electrographic seizures from the posterior head region captured as described, Last clear seizure at 4:46 06/14/23.   Evidence for left posterior temporal cerebral dysfunction    EEG 6/15  At least 7 electrographic seizures from the posterior parietal head region (possibly left > right) captured as described, Last clear seizure at 07:58 06/15/23. Neurology made aware.   Evidence for left posterior temporal cerebral dysfunction    Prelim EEG 6/16   2 electrographic seizures from the posterior parietal head region (left > right) captured as described, Last clear seizure at 08:04 06/16/23. Evidence for left posterior temporal cerebral dysfunction. Multiple push button events that are non-epileptic.

## 2023-06-16 NOTE — PROGRESS NOTE ADULT - ATTENDING COMMENTS
Sky  : By myself (Boni Parks MD).  Patient was seen and examined at bedside. During follow up visit, patient's daughter was present and I deeply appreciate that.   Non focal exam except asymmetric left pupil is bigger and non reactive and right pupil is reactive.     I  agree with the findings and plan as documented in the Resident's note except below.  Etiology of refractory status epilepticus is due to the subtherapeutic antiepileptic medication. She has been seizure free more than 24 hours.  Vimpat 100 mg BID as maintenance.    Continue Keppra 1500 mg BID. Lamictal 25 mg BID.   Discontinue VEEG.   MRI brain with and without contrast seizure protocol.  Continue medical management, neuro- check and fall precaution.  GI and DVT prophylaxis.  Seizure Precautions discussed:  1. No driving for 1 year from date of last seizure as per [place] law  2. No taking a bath alone or showering with standing water > 2 inches  3. No swimming unsupervised  4. No use of automatic or semi-automatic firearms and no using other firearms unsupervised  5. No use of heavy machinery unsupervised  6. No cooking over an open flame on the front burners (back burners OK)  7. For additional recommendations please discuss with neurology or PCP as outpatient  I discussed the diagnosis and treatment plan with the patient and daughter.  All questions and concerns were addressed. The patient demonstrated good understanding of the treatment plan.  If you have any further questions, please do not hesitate to contact our team.  Thank you for allowing us to participate in this patient care.

## 2023-06-17 LAB
ANION GAP SERPL CALC-SCNC: 12 MMOL/L — SIGNIFICANT CHANGE UP (ref 7–14)
BUN SERPL-MCNC: 12 MG/DL — SIGNIFICANT CHANGE UP (ref 7–23)
CALCIUM SERPL-MCNC: 9.2 MG/DL — SIGNIFICANT CHANGE UP (ref 8.4–10.5)
CHLORIDE SERPL-SCNC: 103 MMOL/L — SIGNIFICANT CHANGE UP (ref 98–107)
CO2 SERPL-SCNC: 25 MMOL/L — SIGNIFICANT CHANGE UP (ref 22–31)
CREAT SERPL-MCNC: 0.97 MG/DL — SIGNIFICANT CHANGE UP (ref 0.5–1.3)
EGFR: 66 ML/MIN/1.73M2 — SIGNIFICANT CHANGE UP
GLUCOSE SERPL-MCNC: 99 MG/DL — SIGNIFICANT CHANGE UP (ref 70–99)
HCT VFR BLD CALC: 35.1 % — SIGNIFICANT CHANGE UP (ref 34.5–45)
HGB BLD-MCNC: 11.8 G/DL — SIGNIFICANT CHANGE UP (ref 11.5–15.5)
MAGNESIUM SERPL-MCNC: 2.2 MG/DL — SIGNIFICANT CHANGE UP (ref 1.6–2.6)
MCHC RBC-ENTMCNC: 29 PG — SIGNIFICANT CHANGE UP (ref 27–34)
MCHC RBC-ENTMCNC: 33.6 GM/DL — SIGNIFICANT CHANGE UP (ref 32–36)
MCV RBC AUTO: 86.2 FL — SIGNIFICANT CHANGE UP (ref 80–100)
NRBC # BLD: 0 /100 WBCS — SIGNIFICANT CHANGE UP (ref 0–0)
NRBC # FLD: 0 K/UL — SIGNIFICANT CHANGE UP (ref 0–0)
PHOSPHATE SERPL-MCNC: 4.1 MG/DL — SIGNIFICANT CHANGE UP (ref 2.5–4.5)
PLATELET # BLD AUTO: 188 K/UL — SIGNIFICANT CHANGE UP (ref 150–400)
POTASSIUM SERPL-MCNC: 4 MMOL/L — SIGNIFICANT CHANGE UP (ref 3.5–5.3)
POTASSIUM SERPL-SCNC: 4 MMOL/L — SIGNIFICANT CHANGE UP (ref 3.5–5.3)
RBC # BLD: 4.07 M/UL — SIGNIFICANT CHANGE UP (ref 3.8–5.2)
RBC # FLD: 13 % — SIGNIFICANT CHANGE UP (ref 10.3–14.5)
SODIUM SERPL-SCNC: 140 MMOL/L — SIGNIFICANT CHANGE UP (ref 135–145)
WBC # BLD: 6.04 K/UL — SIGNIFICANT CHANGE UP (ref 3.8–10.5)
WBC # FLD AUTO: 6.04 K/UL — SIGNIFICANT CHANGE UP (ref 3.8–10.5)

## 2023-06-17 PROCEDURE — 99232 SBSQ HOSP IP/OBS MODERATE 35: CPT | Mod: GC

## 2023-06-17 PROCEDURE — 99232 SBSQ HOSP IP/OBS MODERATE 35: CPT

## 2023-06-17 RX ADMIN — LAMOTRIGINE 25 MILLIGRAM(S): 25 TABLET, ORALLY DISINTEGRATING ORAL at 06:27

## 2023-06-17 RX ADMIN — LACOSAMIDE 120 MILLIGRAM(S): 50 TABLET ORAL at 17:45

## 2023-06-17 RX ADMIN — LEVETIRACETAM 400 MILLIGRAM(S): 250 TABLET, FILM COATED ORAL at 17:22

## 2023-06-17 RX ADMIN — LOSARTAN POTASSIUM 50 MILLIGRAM(S): 100 TABLET, FILM COATED ORAL at 06:27

## 2023-06-17 RX ADMIN — LEVETIRACETAM 400 MILLIGRAM(S): 250 TABLET, FILM COATED ORAL at 06:27

## 2023-06-17 RX ADMIN — POLYETHYLENE GLYCOL 3350 17 GRAM(S): 17 POWDER, FOR SOLUTION ORAL at 22:59

## 2023-06-17 RX ADMIN — LACOSAMIDE 120 MILLIGRAM(S): 50 TABLET ORAL at 06:27

## 2023-06-17 RX ADMIN — ENOXAPARIN SODIUM 40 MILLIGRAM(S): 100 INJECTION SUBCUTANEOUS at 17:22

## 2023-06-17 RX ADMIN — LAMOTRIGINE 25 MILLIGRAM(S): 25 TABLET, ORALLY DISINTEGRATING ORAL at 17:22

## 2023-06-17 NOTE — PROGRESS NOTE ADULT - SUBJECTIVE AND OBJECTIVE BOX
Macy Lee  [facility]  Pager #[phone]  Available on [employer] Teams    Patient is a 63y old  Female who presents with a chief complaint of seizures (16 Jun 2023 12:44)      SUBJECTIVE / OVERNIGHT EVENTS: Patient seen and examined at bedside. Patient's daughter and son at bedside. Pt reports feeling well, no dizziness, headache, auras.    ADDITIONAL REVIEW OF SYSTEMS:    MEDICATIONS  (STANDING):  enoxaparin Injectable 40 milliGRAM(s) SubCutaneous every 24 hours  lacosamide IVPB 100 milliGRAM(s) IV Intermittent every 12 hours  lamoTRIgine 25 milliGRAM(s) Oral two times a day  levETIRAcetam  IVPB 1500 milliGRAM(s) IV Intermittent every 12 hours  losartan 50 milliGRAM(s) Oral daily  polyethylene glycol 3350 17 Gram(s) Oral at bedtime    MEDICATIONS  (PRN):  acetaminophen     Tablet .. 650 milliGRAM(s) Oral every 6 hours PRN Temp greater or equal to 38C (100.4F), Mild Pain (1 - 3)  aluminum hydroxide/magnesium hydroxide/simethicone Suspension 30 milliLiter(s) Oral every 4 hours PRN Dyspepsia  LORazepam     Tablet 1 milliGRAM(s) Oral daily PRN Anxiety/AGITATION  LORazepam   Injectable 1 milliGRAM(s) IV Push every 6 hours PRN seizure activity  melatonin 3 milliGRAM(s) Oral at bedtime PRN Insomnia  ondansetron Injectable 4 milliGRAM(s) IV Push every 8 hours PRN Nausea and/or Vomiting  senna 2 Tablet(s) Oral at bedtime PRN Constipation      CAPILLARY BLOOD GLUCOSE        I&O's Summary    16 Jun 2023 07:01  -  17 Jun 2023 07:00  --------------------------------------------------------  IN: 320 mL / OUT: 2 mL / NET: 318 mL        PHYSICAL EXAM:    Vital Signs Last 24 Hrs  T(C): 36.7 (17 Jun 2023 10:15), Max: 37.2 (16 Jun 2023 21:30)  T(F): 98 (17 Jun 2023 10:15), Max: 98.9 (16 Jun 2023 21:30)  HR: 96 (17 Jun 2023 10:15) (82 - 96)  BP: 138/72 (17 Jun 2023 10:15) (130/76 - 147/80)  BP(mean): --  RR: 18 (17 Jun 2023 10:15) (18 - 18)  SpO2: 98% (17 Jun 2023 10:15) (95% - 98%)    Parameters below as of 17 Jun 2023 10:15  Patient On (Oxygen Delivery Method): room air    CONSTITUTIONAL: NAD, well-developed, well-groomed  EYES: Conjunctiva and sclera clear  ENMT: Moist oral mucosa  RESPIRATORY: Normal respiratory effort; lungs are clear to auscultation bilaterally  CARDIOVASCULAR: Regular rate and rhythm, normal S1 and S2, no murmur/rub/gallop; No lower extremity edema  ABDOMEN: Soft, nontender to palpation, normoactive bowel sounds  PSYCH: A+O to person, place, and time; affect appropriate  NEUROLOGY: No focal deficits  SKIN: No rashes; no palpable lesions    LABS:                        11.8   6.04  )-----------( 188      ( 17 Jun 2023 06:17 )             35.1     06-17    140  |  103  |  12  ----------------------------<  99  4.0   |  25  |  0.97    Ca    9.2      17 Jun 2023 06:17  Phos  4.1     06-17  Mg     2.20     06-17    RADIOLOGY & ADDITIONAL TESTS: No new imaging  Results Reviewed: Yes  Imaging Personally Reviewed:  Electrocardiogram Personally Reviewed:    COORDINATION OF CARE:  Care Discussed with Consultants/Other Providers [Y/N]:  Prior or Outpatient Records Reviewed [Y/N]:
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
## 2023-06-17 NOTE — PROGRESS NOTE ADULT - ASSESSMENT
63y (1960) woman, Sky speaking, PMH HTN on losartan, epilepsy on Keppra, who presents to [facility] ED for seizure. Had 60 seconds GTC at home with urinary incontinence. Takes home Keppra inconsistently, supposed to take 500 mg bid but has been taking 500 mg in AM. Sees Dr. Mara Clancy at outpatient neurology. Exam normal. Prelim EEG reports 2 electrographic seizures from the posterior parietal head region (left > right) captured as described, Last clear seizure at 08:04 06/15/23. Evidence for left posterior temporal cerebral dysfunction. Multiple push button events that are non-epileptic.     Impression:  Refractory status epilepticus (now resolved) due to the subtherapeutic antiepileptic medication    Recommendation:   [] c/w Vimpat 100 BID   [] Continue Keppra 1500 mg BID   [] Continue Lamictal 25 mg bid   [x] Discontinued 6/16 vEEG monitoring: no seizures since 8 am 6/15 (over 24 hours)  [] MRI brain w/wo epilepsy protocol   [x] On 6/15/23 s/p: Ativan 4 mg IV, Vimpat 200 mg load once, Keppra 1500 mg load  [] Neurocheck and vital q4  [] PT/OT  [] if patient has a convulsion, please document accurately the length of the episode, and specifically what the patient was doing paying attention to eye opening vs closure, gaze deviation, shaking of extremities, tongue bite, urinary incontinence, any derangement of vital signs  [] If patient has a generalized tonic clonic episode for >3 minutes or significant derangement of vital signs may administer Ativan 1mg IV and call [phone] for further reocmmendations  [] advise patient to not drive, operate heavy machinery, avoid heights, pools, bathtubs, locked doors.  [] Follow outpatient upon discharge at [address] [place] [address] with Dr. Flavio Vasquez     To be discussed with attending.   Case and plan not final until attested by attending.   63y (1960) woman, Sky speaking, PMH HTN on losartan, epilepsy on Keppra, who presents to [facility] ED for seizure. Had 60 seconds GTC at home with urinary incontinence. Takes home Keppra inconsistently, supposed to take 500 mg bid but has been taking 500 mg in AM. Sees Dr. Mara Clancy at outpatient neurology. Exam normal. Prelim EEG reports 2 electrographic seizures from the posterior parietal head region (left > right) captured as described, Last clear seizure at 08:04 06/15/23. Evidence for left posterior temporal cerebral dysfunction.     Impression:  Refractory status epilepticus (now resolved) due to the subtherapeutic antiepileptic medication    Recommendation:   [] c/w Vimpat 100 BID   [] Continue Keppra 1500 mg BID   [] Continue Lamictal 25 mg bid   [x] Discontinued 6/16 vEEG monitoring: no seizures since 8 am 6/15 (over 24 hours)  [] MRI brain w/wo epilepsy protocol   [x] On 6/15/23 s/p: Ativan 4 mg IV, Vimpat 200 mg load once, Keppra 1500 mg load  [] Neurocheck and vital q4  [] PT/OT  [] if patient has a convulsion, please document accurately the length of the episode, and specifically what the patient was doing paying attention to eye opening vs closure, gaze deviation, shaking of extremities, tongue bite, urinary incontinence, any derangement of vital signs  [] If patient has a generalized tonic clonic episode for >3 minutes or significant derangement of vital signs may administer Ativan 1mg IV and call [phone] for further reocmmendations  [] advise patient to not drive, operate heavy machinery, avoid heights, pools, bathtubs, locked doors.  [] Follow outpatient upon discharge at [address] [place] [address] with Dr. Flavio Vasquez     To be discussed with attending.   Case and plan not final until attested by attending.
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
## 2023-06-17 NOTE — PHYSICAL THERAPY INITIAL EVALUATION ADULT - PERTINENT HX OF CURRENT PROBLEM, REHAB EVAL
Pt is a 63 year old female with history of seizure disorder, hypertension, who presents after seizure. Admitted for seizure treatment and management.

## 2023-06-17 NOTE — PROGRESS NOTE ADULT - SUBJECTIVE AND OBJECTIVE BOX
Neurology Progress Note    SUBJECTIVE/OBJECTIVE/INTERVAL EVENTS: Patient seen and examined at bedside.     INTERVAL HISTORY: No new overnight events reported by patient.     EEG 6/16 showed 2 electrographic seizures from the posterior parietal head region (left > right) captured as described, Last clear seizure at 08:04 06/15/23.    REVIEW OF SYSTEMS: Otherwise denies new fever, chills, headaches, vision changes, blurry vision, double vision, nausea, vomiting, hearing change, focal weakness, focal numbness, parasthesias, bowel/ bladder incontinence.    VITALS & EXAMINATION:  Vital Signs Last 24 Hrs  T(C): 36.7 (17 Jun 2023 10:15), Max: 37.2 (16 Jun 2023 21:30)  T(F): 98 (17 Jun 2023 10:15), Max: 98.9 (16 Jun 2023 21:30)  HR: 96 (17 Jun 2023 10:15) (82 - 96)  BP: 138/72 (17 Jun 2023 10:15) (130/76 - 147/80)  BP(mean): --  RR: 18 (17 Jun 2023 10:15) (18 - 18)  SpO2: 98% (17 Jun 2023 10:15) (95% - 98%)    Parameters below as of 17 Jun 2023 10:15  Patient On (Oxygen Delivery Method): room air        Neurologic Exam:  Mental status - Awake, Alert, Oriented to person, place, and time). Speech clear, fluent. Follows simple and complex commands    Cranial nerves - PERRL, VFF, EOMI, facial strength intact without asymmetry b/l, palate with symmetric elevation, sternocleidomastiod 5/5 strength b/l, tongue midline on protrusion with full lateral movement    Motor - Normal bulk and tone throughout. No pronator drift.  Strength testing                Biceps      Triceps              R            5                 5               5                                                   L             5                 5               5                                                           Hip Flexion    Hip Extension        Dorsiflexion    Plantar Flexion  R              5                           5                       5                           5                           L              5                           5                        5                           5                               Sensation - Light touch intact throughout    DTR's -             Biceps      Triceps     Brachioradialis      Patellar    Ankle    Toes/plantar response  R             2+             2+                  2+                       2+            2+                 Down  L              2+             2+                 2+                        2+           2+                 Down    Coordination - Finger to Nose intact b/l. No tremors appreciated    LABORATORY:  CBC                       11.8   6.04  )-----------( 188      ( 17 Jun 2023 06:17 )             35.1     Chem 06-17    140  |  103  |  12  ----------------------------<  99  4.0   |  25  |  0.97    Ca    9.2      17 Jun 2023 06:17  Phos  4.1     06-17  Mg     2.20     06-17      LFTs   Coagulopathy   Lipid Panel 06-14 Chol 171 LDL -- HDL 39<L> Trig 178<H>  A1c   Cardiac enzymes     U/A   CSF  Immunological  Other    STUDIES & IMAGING: (EEG, CT, MR, U/S, TTE/KIERAN):    EEG report 6/16  EEG Classification / Summary:  Abnormal  EEG in the awake / drowsy / asleep state(s).    2 electrographic seizures from the posterior parietal head region  (left > right) captured as described, Last clear seizure at 08:04 06/15/23.   Intermittent left posterior temporal slowing  Multiple push button events without EEG ictal change    Clinical Impression:    2 electrographic seizures from the posterior parietal head region (left > right) captured as described, Last clear seizure at 08:04 06/15/23.  Evidence for left posterior temporal cerebral dysfunction  Multiple push button events that are non-epileptic            ACC: [id number] EXAM: CT BRAIN ORDERED BY: KATELIN BALLADR    PROCEDURE DATE: 06/13/2023        INTERPRETATION: INDICATION: Seizures  TECHNIQUE: Multiple axial CT images were performed through the head without IV contrast using standard protocol. Coronal and sagittal images are reformatted from the axial data.    COMPARISON EXAMINATION: CT head 10/18/2021    FINDINGS:    CT HEAD:    HEMISPHERES: No mass or space occupying lesion. No brain edema or hemorrhagic foci are suggested. Small vessel white matter ischemic changes are noted.  VENTRICLES: No hydrocephalus. No midline shift.  POSTERIOR FOSSA: The brain stem and cerebellum are unremarkable.  EXTRACEREBRAL SPACES: No subdural or epidural collections are noted.  SKULL BASE AND CALVARIUM: Intact.  SINUSES AND MASTOIDS: Mild patchy mucosal thickening of the right maxillary sinus. The mastoid air cells are clear.  MISCELLANEOUS: No orbital or suprasellar abnormality noted.      IMPRESSION:    No hydrocephalus, mass effect, midline shift, acute intracranial hemorrhage, or brain edema.    --- End of Report ---   Neurology Progress Note    SUBJECTIVE/OBJECTIVE/INTERVAL EVENTS: Patient seen and examined at bedside.     INTERVAL HISTORY: No new overnight events reported by patient. Per patient's son, she had 15 seconds of arm shaking and mouth opening while she slept last night.    EEG 6/16 showed 2 electrographic seizures from the posterior parietal head region (left > right) captured as described, Last clear seizure at 08:04 06/15/23.    REVIEW OF SYSTEMS: Otherwise denies new fever, chills, headaches, vision changes, blurry vision, double vision, nausea, vomiting, hearing change, focal weakness, focal numbness, parasthesias, bowel/ bladder incontinence.    VITALS & EXAMINATION:  Vital Signs Last 24 Hrs  T(C): 36.7 (17 Jun 2023 10:15), Max: 37.2 (16 Jun 2023 21:30)  T(F): 98 (17 Jun 2023 10:15), Max: 98.9 (16 Jun 2023 21:30)  HR: 96 (17 Jun 2023 10:15) (82 - 96)  BP: 138/72 (17 Jun 2023 10:15) (130/76 - 147/80)  BP(mean): --  RR: 18 (17 Jun 2023 10:15) (18 - 18)  SpO2: 98% (17 Jun 2023 10:15) (95% - 98%)    Parameters below as of 17 Jun 2023 10:15  Patient On (Oxygen Delivery Method): room air        Neurologic Exam:  Mental status - Awake, Alert, Oriented to person, place, and time). Speech clear, fluent. Follows simple and complex commands    Cranial nerves - PERRL, VFF, EOMI, facial strength intact without asymmetry b/l, palate with symmetric elevation, sternocleidomastiod 5/5 strength b/l, tongue midline on protrusion with full lateral movement    Motor - Normal bulk and tone throughout. No pronator drift.  Strength testing                Biceps      Triceps              R            5                 5               5                                                   L             5                 5               5                                                           Hip Flexion    Hip Extension        Dorsiflexion    Plantar Flexion  R              5                           5                       5                           5                           L              5                           5                        5                           5                               Sensation - Light touch intact throughout    DTR's -             Biceps      Triceps     Brachioradialis      Patellar    Ankle    Toes/plantar response  R             2+             2+                  2+                       2+            2+                 Down  L              2+             2+                 2+                        2+           2+                 Down    Coordination - Finger to Nose intact b/l. No tremors appreciated    LABORATORY:  CBC                       11.8   6.04  )-----------( 188      ( 17 Jun 2023 06:17 )             35.1     Chem 06-17    140  |  103  |  12  ----------------------------<  99  4.0   |  25  |  0.97    Ca    9.2      17 Jun 2023 06:17  Phos  4.1     06-17  Mg     2.20     06-17      LFTs   Coagulopathy   Lipid Panel 06-14 Chol 171 LDL -- HDL 39<L> Trig 178<H>  A1c   Cardiac enzymes     U/A   CSF  Immunological  Other    STUDIES & IMAGING: (EEG, CT, MR, U/S, TTE/KIERAN):    EEG report 6/16  EEG Classification / Summary:  Abnormal  EEG in the awake / drowsy / asleep state(s).    2 electrographic seizures from the posterior parietal head region  (left > right) captured as described, Last clear seizure at 08:04 06/15/23.   Intermittent left posterior temporal slowing  Multiple push button events without EEG ictal change    Clinical Impression:    2 electrographic seizures from the posterior parietal head region (left > right) captured as described, Last clear seizure at 08:04 06/15/23.  Evidence for left posterior temporal cerebral dysfunction  Multiple push button events that are non-epileptic            ACC: [id number] EXAM: CT BRAIN ORDERED BY: KATELIN BALLARD    PROCEDURE DATE: 06/13/2023        INTERPRETATION: INDICATION: Seizures  TECHNIQUE: Multiple axial CT images were performed through the head without IV contrast using standard protocol. Coronal and sagittal images are reformatted from the axial data.    COMPARISON EXAMINATION: CT head 10/18/2021    FINDINGS:    CT HEAD:    HEMISPHERES: No mass or space occupying lesion. No brain edema or hemorrhagic foci are suggested. Small vessel white matter ischemic changes are noted.  VENTRICLES: No hydrocephalus. No midline shift.  POSTERIOR FOSSA: The brain stem and cerebellum are unremarkable.  EXTRACEREBRAL SPACES: No subdural or epidural collections are noted.  SKULL BASE AND CALVARIUM: Intact.  SINUSES AND MASTOIDS: Mild patchy mucosal thickening of the right maxillary sinus. The mastoid air cells are clear.  MISCELLANEOUS: No orbital or suprasellar abnormality noted.      IMPRESSION:    No hydrocephalus, mass effect, midline shift, acute intracranial hemorrhage, or brain edema.    --- End of Report ---   Neurology Progress Note    SUBJECTIVE/OBJECTIVE/INTERVAL EVENTS: Patient seen and examined at bedside.     INTERVAL HISTORY: No new overnight events reported by patient. Per patient's son, she had 15 seconds of arm shaking and mouth opening while she slept last night.    EEG 6/16 showed 2 electrographic seizures from the posterior parietal head region (left > right) captured as described, Last clear seizure at 08:04 06/15/23.    REVIEW OF SYSTEMS: Otherwise denies new fever, chills, headaches, vision changes, blurry vision, double vision, nausea, vomiting, hearing change, focal weakness, focal numbness, parasthesias, bowel/ bladder incontinence.    VITALS & EXAMINATION:  Vital Signs Last 24 Hrs  T(C): 36.7 (17 Jun 2023 10:15), Max: 37.2 (16 Jun 2023 21:30)  T(F): 98 (17 Jun 2023 10:15), Max: 98.9 (16 Jun 2023 21:30)  HR: 96 (17 Jun 2023 10:15) (82 - 96)  BP: 138/72 (17 Jun 2023 10:15) (130/76 - 147/80)  BP(mean): --  RR: 18 (17 Jun 2023 10:15) (18 - 18)  SpO2: 98% (17 Jun 2023 10:15) (95% - 98%)    Parameters below as of 17 Jun 2023 10:15  Patient On (Oxygen Delivery Method): room air        Neurologic Exam:  Mental status - Awake, Alert, Oriented to person, place, and time). Speech clear, fluent. Follows simple and complex commands    Cranial nerves - PERRL, VFF, EOMI, facial strength intact without asymmetry b/l, palate with symmetric elevation, sternocleidomastiod 5/5 strength b/l, tongue midline on protrusion with full lateral movement    Motor - Normal bulk and tone throughout. No pronator drift.  Strength testing                Biceps      Triceps              R            5                 5               5                                                   L             5                 5               5                                                           Hip Flexion    Hip Extension        Dorsiflexion    Plantar Flexion  R              5                           5                       5                           5                           L              5                           5                        5                           5                               Sensation - Light touch intact throughout    DTR's -             Biceps      Triceps     Brachioradialis      Patellar    Ankle    Toes/plantar response  R             2+             2+                  2+                       2+            2+                 Down  L              2+             2+                 2+                        2+           2+                 Down    Coordination - Finger to Nose intact b/l. No tremors appreciated    LABORATORY:  CBC                       11.8   6.04  )-----------( 188      ( 17 Jun 2023 06:17 )             35.1     Chem 06-17    140  |  103  |  12  ----------------------------<  99  4.0   |  25  |  0.97    Ca    9.2      17 Jun 2023 06:17  Phos  4.1     06-17  Mg     2.20     06-17      LFTs   Coagulopathy   Lipid Panel 06-14 Chol 171 LDL -- HDL 39<L> Trig 178<H>  A1c   Cardiac enzymes     U/A   CSF  Immunological  Other    STUDIES & IMAGING: (EEG, CT, MR, U/S, TTE/KIERAN):    EEG report 6/16  EEG Classification / Summary:  Abnormal  EEG in the awake / drowsy / asleep state(s).    2 electrographic seizures from the posterior parietal head region  (left > right) captured as described, Last clear seizure at 08:04 06/15/23.   Intermittent left posterior temporal slowing    Clinical Impression:    2 electrographic seizures from the posterior parietal head region (left > right) captured as described, Last clear seizure at 08:04 06/15/23.  Evidence for left posterior temporal cerebral dysfunction              ACC: [id number] EXAM: CT BRAIN ORDERED BY: KATELIN BALLARD    PROCEDURE DATE: 06/13/2023        INTERPRETATION: INDICATION: Seizures  TECHNIQUE: Multiple axial CT images were performed through the head without IV contrast using standard protocol. Coronal and sagittal images are reformatted from the axial data.    COMPARISON EXAMINATION: CT head 10/18/2021    FINDINGS:    CT HEAD:    HEMISPHERES: No mass or space occupying lesion. No brain edema or hemorrhagic foci are suggested. Small vessel white matter ischemic changes are noted.  VENTRICLES: No hydrocephalus. No midline shift.  POSTERIOR FOSSA: The brain stem and cerebellum are unremarkable.  EXTRACEREBRAL SPACES: No subdural or epidural collections are noted.  SKULL BASE AND CALVARIUM: Intact.  SINUSES AND MASTOIDS: Mild patchy mucosal thickening of the right maxillary sinus. The mastoid air cells are clear.  MISCELLANEOUS: No orbital or suprasellar abnormality noted.      IMPRESSION:    No hydrocephalus, mass effect, midline shift, acute intracranial hemorrhage, or brain edema.    --- End of Report ---

## 2023-06-17 NOTE — PROGRESS NOTE ADULT - PROBLEM SELECTOR PLAN 1
Presents with seizure activity in ED triage for > 1 min, was being weaned off Keppra as outpatient as outpatient, taking 500mg qhs  - neuro following  - c/w Keppra 1500mg bid, Lamictal 25mg PO BID, Vimpat 100mg PO BID  - vEEG + seizures, last 2 seizures on 6/15 at 8 AM  - lactate 12.8 on admission, now improved to 1.6, likely elevated in setting of seizure  - CTH negative for stroke, hemorrhage with no masses or lesions   - check MRI brain r/o structural seizure focus  - aspiration, seizure precautions   -Patient says stress is causing depression and is ok with seeing a psychiatrist about it. Psychiatry consulted

## 2023-06-17 NOTE — OCCUPATIONAL THERAPY INITIAL EVALUATION ADULT - LIVES WITH, PROFILE
Patient instructed to return to the ED or call 911 if patient experiences SI, HI, hopelessness, worsening of symptoms or has any other concerns. Patient lives in a private home with her son with 4 steps to enter. Denies son 12 year-old self-referred

## 2023-06-17 NOTE — PROGRESS NOTE ADULT - ATTENDING COMMENTS
Sky  : By myself (Boni Parks MD).  Patient was seen and examined at bedside. During follow up visit, patient's son was present and I deeply appreciate that.   Non focal exam except asymmetric left pupil is bigger and non reactive and right pupil is reactive.     I agree with the findings and plan as documented in the Resident's note except below.  Etiology of refractory status epilepticus is due to the subtherapeutic antiepileptic medication. Clinically she has been stable  Vimpat 100 mg BID as maintenance.    Continue Keppra 1500 mg BID. Lamictal 25 mg BID.   Repeat VEEG 2 hours tomorrow.   MRI brain with and without contrast seizure protocol is pending schedule for today 7:00 pm.  Continue medical management, neuro- check and fall precaution.  GI and DVT prophylaxis.  I discussed the diagnosis and treatment plan with the patient and son.  All questions and concerns were addressed. The patient demonstrated good understanding of the treatment plan.  If you have any further questions, please do not hesitate to contact our team.  Thank you for allowing us to participate in this patient care.

## 2023-06-17 NOTE — OCCUPATIONAL THERAPY INITIAL EVALUATION ADULT - GENERAL OBSERVATIONS, REHAB EVAL
Patient received semisupine in bed in NAD; agreeable to participate in OT evaluation. BP: 138/71 mmHg.  bpm.

## 2023-06-17 NOTE — OCCUPATIONAL THERAPY INITIAL EVALUATION ADULT - PERTINENT HX OF CURRENT PROBLEM, REHAB EVAL
63 year old female with history of seizure disorder, HTN who presents after seizure. Admitted for seizure treatment and management.

## 2023-06-18 PROCEDURE — 99232 SBSQ HOSP IP/OBS MODERATE 35: CPT | Mod: GC

## 2023-06-18 PROCEDURE — 95720 EEG PHY/QHP EA INCR W/VEEG: CPT

## 2023-06-18 PROCEDURE — 99232 SBSQ HOSP IP/OBS MODERATE 35: CPT

## 2023-06-18 RX ORDER — LOSARTAN POTASSIUM 100 MG/1
100 TABLET, FILM COATED ORAL DAILY
Refills: 0 | Status: DISCONTINUED | OUTPATIENT
Start: 2023-06-19 | End: 2023-06-20

## 2023-06-18 RX ADMIN — ENOXAPARIN SODIUM 40 MILLIGRAM(S): 100 INJECTION SUBCUTANEOUS at 18:53

## 2023-06-18 RX ADMIN — LEVETIRACETAM 400 MILLIGRAM(S): 250 TABLET, FILM COATED ORAL at 18:52

## 2023-06-18 RX ADMIN — LAMOTRIGINE 25 MILLIGRAM(S): 25 TABLET, ORALLY DISINTEGRATING ORAL at 18:53

## 2023-06-18 RX ADMIN — LAMOTRIGINE 25 MILLIGRAM(S): 25 TABLET, ORALLY DISINTEGRATING ORAL at 06:43

## 2023-06-18 RX ADMIN — LACOSAMIDE 120 MILLIGRAM(S): 50 TABLET ORAL at 07:07

## 2023-06-18 RX ADMIN — LACOSAMIDE 120 MILLIGRAM(S): 50 TABLET ORAL at 19:05

## 2023-06-18 RX ADMIN — LEVETIRACETAM 400 MILLIGRAM(S): 250 TABLET, FILM COATED ORAL at 06:45

## 2023-06-18 RX ADMIN — LOSARTAN POTASSIUM 50 MILLIGRAM(S): 100 TABLET, FILM COATED ORAL at 06:43

## 2023-06-18 NOTE — PROGRESS NOTE ADULT - PROBLEM SELECTOR PLAN 1
Presents with seizure activity in ED triage for > 1 min, was being weaned off Keppra as outpatient as outpatient, taking 500mg qhs  - neuro following  - c/w Keppra 1500mg bid, Lamictal 25mg PO BID, Vimpat 100mg PO BID  - vEEG + seizures, last 2 seizures on 6/15 at 8 AM  - neuro recommending repeat vEEG for 2 hours due to episode of right arm shaking reported by son on 6/17  - lactate 12.8 on admission, now improved to 1.6, likely elevated in setting of seizure  - CTH negative for stroke, hemorrhage with no masses or lesions   - check MRI brain r/o structural seizure focus- called to expedite today  - aspiration, seizure precautions   -Patient says stress is causing depression and is ok with seeing a psychiatrist about it. Psychiatry consulted

## 2023-06-18 NOTE — PROGRESS NOTE ADULT - SUBJECTIVE AND OBJECTIVE BOX
Neurology Progress Note    Patient SUSHMA MAINI is a 63y (1960) Sky speaking woman with HTN, epilepsy on Keppra admitted for seizure. Patient was feeling as though she was about to have a seizure all day. Daughter brought her to hospital. Patient had 60 second GTC in triage. She had a post ictal period. Last seizure was 4 years ago per family.     Interval History: Patient in bed, no complaints this morning. Daughter at bedside updated. No overnight events.     ROS: As above    Medications:  MEDICATIONS  (STANDING):  enoxaparin Injectable 40 milliGRAM(s) SubCutaneous every 24 hours  lacosamide IVPB 100 milliGRAM(s) IV Intermittent every 12 hours  lamoTRIgine 25 milliGRAM(s) Oral two times a day  levETIRAcetam  IVPB 1500 milliGRAM(s) IV Intermittent every 12 hours  losartan 50 milliGRAM(s) Oral daily  polyethylene glycol 3350 17 Gram(s) Oral at bedtime    MEDICATIONS  (PRN):  acetaminophen     Tablet .. 650 milliGRAM(s) Oral every 6 hours PRN Temp greater or equal to 38C (100.4F), Mild Pain (1 - 3)  aluminum hydroxide/magnesium hydroxide/simethicone Suspension 30 milliLiter(s) Oral every 4 hours PRN Dyspepsia  LORazepam     Tablet 1 milliGRAM(s) Oral daily PRN Anxiety/AGITATION  LORazepam   Injectable 1 milliGRAM(s) IV Push every 6 hours PRN seizure activity  melatonin 3 milliGRAM(s) Oral at bedtime PRN Insomnia  ondansetron Injectable 4 milliGRAM(s) IV Push every 8 hours PRN Nausea and/or Vomiting  senna 2 Tablet(s) Oral at bedtime PRN Constipation      Vitals:  T(C): 36.7 (06-18-23 @ 06:43), Max: 37.1 (06-17-23 @ 17:36)  HR: 85 (06-18-23 @ 06:43) (85 - 96)  BP: 138/73 (06-18-23 @ 06:43) (125/69 - 138/73)  RR: 18 (06-18-23 @ 06:43) (18 - 18)  SpO2: 100% (06-18-23 @ 06:43) (96% - 100%)    Physical Examination:  General - NAD    Neurologic Exam:  Mental status - Awake, Alert, Oriented to person, place, and time). Speech clear, fluent. Follows simple and complex commands    Cranial nerves - PERRL, VFF, EOMI, facial strength intact without asymmetry b/l, palate with symmetric elevation, sternocleidomastiod 5/5 strength b/l, tongue midline on protrusion with full lateral movement    Motor - Normal bulk and tone throughout. No pronator drift.  Strength testing                Biceps      Triceps              R            5                 5               5                                                   L             5                 5               5                                                           Hip Flexion    Hip Extension        Dorsiflexion    Plantar Flexion  R              5                           5                       5                           5                           L              5                           5                        5                           5                               Sensation - Light touch intact throughout    Coordination - No tremors appreciated    Gait and station -Deffered due to fall/safety risk       Labs:                        11.8   6.04  )-----------( 188      ( 17 Jun 2023 06:17 )             35.1     06-17    140  |  103  |  12  ----------------------------<  99  4.0   |  25  |  0.97    Ca    9.2      17 Jun 2023 06:17  Phos  4.1     06-17  Mg     2.20     06-17      Radiology:  < from: CT Head No Cont (06.13.23 @ 01:23) >    IMPRESSION:    No hydrocephalus,mass effect, midline shift, acute intracranial   hemorrhage, or brain edema.      EEG 6/14:  Clinical Impression:  At least 11 poorly lateralized electrographic seizures from the posterior head region captured as described, Last clear seizure at 4:46 06/14/23.   Evidence for left posterior temporal cerebral dysfunction    EEG 6/15  At least 7 electrographic seizures from the posterior parietal head region (possibly left > right) captured as described, Last clear seizure at 07:58 06/15/23. Neurology made aware.   Evidence for left posterior temporal cerebral dysfunction    Prelim EEG 6/16   2 electrographic seizures from the posterior parietal head region (left > right) captured as described, Last clear seizure at 08:04 06/16/23. Evidence for left posterior temporal cerebral dysfunction. Multiple push button events that are non-epileptic.    Neurology Progress Note    Patient SUSHMA MAINI is a 63y (1960) Sky speaking woman with HTN, epilepsy on Keppra admitted for seizure. Patient was feeling as though she was about to have a seizure all day. Daughter brought her to hospital. Patient had 60 second GTC in triage. She had a post ictal period. Last seizure was 4 years ago per family.     Interval History: Patient in bed, no complaints this morning. Daughter at bedside updated. No overnight events.     ROS: As above    Medications:  MEDICATIONS  (STANDING):  enoxaparin Injectable 40 milliGRAM(s) SubCutaneous every 24 hours  lacosamide IVPB 100 milliGRAM(s) IV Intermittent every 12 hours  lamoTRIgine 25 milliGRAM(s) Oral two times a day  levETIRAcetam  IVPB 1500 milliGRAM(s) IV Intermittent every 12 hours  losartan 50 milliGRAM(s) Oral daily  polyethylene glycol 3350 17 Gram(s) Oral at bedtime    MEDICATIONS  (PRN):  acetaminophen     Tablet .. 650 milliGRAM(s) Oral every 6 hours PRN Temp greater or equal to 38C (100.4F), Mild Pain (1 - 3)  aluminum hydroxide/magnesium hydroxide/simethicone Suspension 30 milliLiter(s) Oral every 4 hours PRN Dyspepsia  LORazepam     Tablet 1 milliGRAM(s) Oral daily PRN Anxiety/AGITATION  LORazepam   Injectable 1 milliGRAM(s) IV Push every 6 hours PRN seizure activity  melatonin 3 milliGRAM(s) Oral at bedtime PRN Insomnia  ondansetron Injectable 4 milliGRAM(s) IV Push every 8 hours PRN Nausea and/or Vomiting  senna 2 Tablet(s) Oral at bedtime PRN Constipation      Vitals:  T(C): 36.7 (06-18-23 @ 06:43), Max: 37.1 (06-17-23 @ 17:36)  HR: 85 (06-18-23 @ 06:43) (85 - 96)  BP: 138/73 (06-18-23 @ 06:43) (125/69 - 138/73)  RR: 18 (06-18-23 @ 06:43) (18 - 18)  SpO2: 100% (06-18-23 @ 06:43) (96% - 100%)    Physical Examination:  General - NAD    Neurologic Exam:  Mental status - Awake, Alert, Oriented to person, place, and time). Speech clear, fluent. Follows simple and complex commands    Cranial nerves - PERRL, VFF, EOMI, facial strength intact without asymmetry b/l, palate with symmetric elevation, sternocleidomastiod 5/5 strength b/l, tongue midline on protrusion with full lateral movement    Motor - Normal bulk and tone throughout. No pronator drift.  Strength testing                Biceps      Triceps              R            5                 5               5                                                   L             5                 5               5                                                           Hip Flexion    Hip Extension        Dorsiflexion    Plantar Flexion  R              5                           5                       5                           5                           L              5                           5                        5                           5                               Sensation - Light touch intact throughout    Coordination - No tremors appreciated    Gait and station -Deffered due to fall/safety risk       Labs:                        11.8   6.04  )-----------( 188      ( 17 Jun 2023 06:17 )             35.1     06-17    140  |  103  |  12  ----------------------------<  99  4.0   |  25  |  0.97    Ca    9.2      17 Jun 2023 06:17  Phos  4.1     06-17  Mg     2.20     06-17      Radiology:  < from: CT Head No Cont (06.13.23 @ 01:23) >    IMPRESSION:    No hydrocephalus,mass effect, midline shift, acute intracranial   hemorrhage, or brain edema.      EEG 6/14:  Clinical Impression:  At least 11 poorly lateralized electrographic seizures from the posterior head region captured as described, Last clear seizure at 4:46 06/14/23.   Evidence for left posterior temporal cerebral dysfunction    EEG 6/15  At least 7 electrographic seizures from the posterior parietal head region (possibly left > right) captured as described, Last clear seizure at 07:58 06/15/23. Neurology made aware.   Evidence for left posterior temporal cerebral dysfunction    Prelim EEG 6/16   2 electrographic seizures from the posterior parietal head region (left > right) captured as described, Last clear seizure at 08:04 06/15/23. Evidence for left posterior temporal cerebral dysfunction. Multiple push button events that are non-epileptic.

## 2023-06-18 NOTE — PROGRESS NOTE ADULT - ASSESSMENT
63y (1960) woman, Sky speaking, PMH HTN on losartan, epilepsy on Keppra, who presents to [facility] ED for seizure. Had 60 seconds GTC at home with urinary incontinence. Takes home Keppra inconsistently, supposed to take 500 mg bid but has been taking 500 mg in AM. Sees Dr. Mara Clancy at outpatient neurology. Exam normal. Prelim EEG reports 2 electrographic seizures from the posterior parietal head region (left > right) captured as described, Last clear seizure at 08:04 06/15/23. Evidence for left posterior temporal cerebral dysfunction. Multiple push button events that are non-epileptic.       Impression:  Refractory status epilepticus is due to the subtherapeutic antiepileptic medication      Recommendation:   [] Repeat vEEG for 2 hours   [] MRI brain w/wo epilepsy protocol   [x] vEEG monitoring: no seizures since 8 am 6/15 (over 24 hours)  [x] On 6/15/23 s/p: Ativan 4 mg IV, Vimpat 200 mg load once, Keppra 1500 mg load  [] Continue vimpat 100 mg BID   [] Continue Keppra 1500 mg BID   [] Continue Lamictal 25 mg bid   [] Neurocheck and vital q4  [] PT/OT  [] if patient has a convulsion, please document accurately the length of the episode, and specifically what the patient was doing paying attention to eye opening vs closure, gaze deviation, shaking of extremities, tongue bite, urinary incontinence, any derangement of vital signs  [] If patient has a generalized tonic clonic episode for >3 minutes or significant derangement of vital signs may administer Ativan 1mg IV and call [phone] for further reocmmendations  [] advise patient to not drive, operate heavy machinery, avoid heights, pools, bathtubs, locked doors.  [] Follow outpatient upon discharge at [address] [place] [address] with Dr. Flavio Vasquez     To be seen and discussed with attending.  63y (1960) woman, Sky speaking, PMH HTN on losartan, epilepsy on Keppra, who presents to [facility] ED for seizure. Had 60 seconds GTC at home with urinary incontinence. Takes home Keppra inconsistently, supposed to take 500 mg bid but has been taking 500 mg in AM. Sees Dr. Mara Clancy at outpatient neurology. Exam normal.       Impression:  Refractory status epilepticus is due to the subtherapeutic antiepileptic medication      Recommendation:   [] Repeat vEEG for 2 hours   [] MRI brain w/wo epilepsy protocol   [x] vEEG monitoring: no seizures since 8 am 6/15 (over 24 hours)  [x] On 6/15/23 s/p: Ativan 4 mg IV, Vimpat 200 mg load once, Keppra 1500 mg load  [] Continue vimpat 100 mg BID   [] Continue Keppra 1500 mg BID   [] Continue Lamictal 25 mg bid   [] Neurocheck and vital q4  [] PT/OT  [] if patient has a convulsion, please document accurately the length of the episode, and specifically what the patient was doing paying attention to eye opening vs closure, gaze deviation, shaking of extremities, tongue bite, urinary incontinence, any derangement of vital signs  [] If patient has a generalized tonic clonic episode for >3 minutes or significant derangement of vital signs may administer Ativan 1mg IV and call [phone] for further reocmmendations  [] advise patient to not drive, operate heavy machinery, avoid heights, pools, bathtubs, locked doors.  [] Follow outpatient upon discharge at [address] [place] [address] with Dr. Flavio Vasquez     To be seen and discussed with attending.

## 2023-06-18 NOTE — PROGRESS NOTE ADULT - SUBJECTIVE AND OBJECTIVE BOX
Macy Lee  [facility] Medicine  Pager #[phone]  Available on [employer] Teams    Patient is a 63y old  Female who presents with a chief complaint of seizures (18 Jun 2023 08:01)      SUBJECTIVE / OVERNIGHT EVENTS: Patient seen and examined at bedside. Patient's daughter at bedside. Pt sitting in chair, states she felt a little dizzy while walking today since she has been lying in bed for so long. Otherwise denies other symptoms.    ADDITIONAL REVIEW OF SYSTEMS:    MEDICATIONS  (STANDING):  enoxaparin Injectable 40 milliGRAM(s) SubCutaneous every 24 hours  lacosamide IVPB 100 milliGRAM(s) IV Intermittent every 12 hours  lamoTRIgine 25 milliGRAM(s) Oral two times a day  levETIRAcetam  IVPB 1500 milliGRAM(s) IV Intermittent every 12 hours  losartan 50 milliGRAM(s) Oral daily  polyethylene glycol 3350 17 Gram(s) Oral at bedtime    MEDICATIONS  (PRN):  acetaminophen     Tablet .. 650 milliGRAM(s) Oral every 6 hours PRN Temp greater or equal to 38C (100.4F), Mild Pain (1 - 3)  aluminum hydroxide/magnesium hydroxide/simethicone Suspension 30 milliLiter(s) Oral every 4 hours PRN Dyspepsia  LORazepam     Tablet 1 milliGRAM(s) Oral daily PRN Anxiety/AGITATION  LORazepam   Injectable 1 milliGRAM(s) IV Push every 6 hours PRN seizure activity  melatonin 3 milliGRAM(s) Oral at bedtime PRN Insomnia  ondansetron Injectable 4 milliGRAM(s) IV Push every 8 hours PRN Nausea and/or Vomiting  senna 2 Tablet(s) Oral at bedtime PRN Constipation      CAPILLARY BLOOD GLUCOSE        I&O's Summary    17 Jun 2023 07:01  -  18 Jun 2023 07:00  --------------------------------------------------------  IN: 630 mL / OUT: 0 mL / NET: 630 mL        PHYSICAL EXAM:    Vital Signs Last 24 Hrs  T(C): 36.8 (18 Jun 2023 10:00), Max: 37.1 (17 Jun 2023 17:36)  T(F): 98.3 (18 Jun 2023 10:00), Max: 98.8 (17 Jun 2023 23:02)  HR: 90 (18 Jun 2023 10:00) (85 - 91)  BP: 147/81 (18 Jun 2023 10:00) (125/69 - 147/81)  BP(mean): --  RR: 18 (18 Jun 2023 10:00) (18 - 18)  SpO2: 99% (18 Jun 2023 10:00) (96% - 100%)    Parameters below as of 18 Jun 2023 10:00  Patient On (Oxygen Delivery Method): room air    CONSTITUTIONAL: NAD, well-developed, well-groomed  EYES: Conjunctiva and sclera clear  ENMT: Moist oral mucosa  RESPIRATORY: Normal respiratory effort; lungs are clear to auscultation bilaterally  CARDIOVASCULAR: Regular rate and rhythm, normal S1 and S2, no murmur/rub/gallop; No lower extremity edema  ABDOMEN: Soft, nontender to palpation, normoactive bowel sounds  PSYCH: A+O to person, place, and time; affect appropriate  NEUROLOGY: No focal deficits  SKIN: No rashes; no palpable lesions    LABS:                        11.8   6.04  )-----------( 188      ( 17 Jun 2023 06:17 )             35.1     06-17    140  |  103  |  12  ----------------------------<  99  4.0   |  25  |  0.97    Ca    9.2      17 Jun 2023 06:17  Phos  4.1     06-17  Mg     2.20     06-17    RADIOLOGY & ADDITIONAL TESTS: No new imaging  Results Reviewed: Yes  Imaging Personally Reviewed:  Electrocardiogram Personally Reviewed:    COORDINATION OF CARE:  Care Discussed with Consultants/Other Providers [Y/N]:  Prior or Outpatient Records Reviewed [Y/N]:

## 2023-06-18 NOTE — CHART NOTE - NSCHARTNOTEFT_GEN_A_CORE
EEG preliminary read (not final) on the initial recording hour(s) = x 2 hrs     No seizures or epileptiform discharges recorded.  Final report to follow tomorrow morning after completion of study.    [facility] EEG Reading Room Ph#: [phone]  Epilepsy Answering Service after 5PM and before 8:30AM: Ph#: [phone]

## 2023-06-18 NOTE — PROGRESS NOTE ADULT - ATTENDING COMMENTS
Sky  : By myself (Boni Parks MD).  Patient was seen and examined at bedside. During follow up visit, patient's daughter was present and I deeply appreciate that. As per patient and daughter, she is doing fine and slept well. No new seizure or neurologic symptoms were reported.   Non focal exam except asymmetric left pupil is bigger and non reactive and right pupil is reactive.     I agree with the findings and plan as documented in the Resident's note except below.  Etiology of refractory status epilepticus is due to the subtherapeutic antiepileptic medication. Throughout admission clinically she remains stable.   Patient will benefit from case manger or  consult for home head air since patient is lives alone and cooking byself. Advise go to the open flames like cooking in kitchen without supervision.     Vimpat 100 mg BID.     Continue Keppra 1500 mg BID.   Lamictal 25 mg BID after one week she will benefit from increase Lamictal 50 mg BID. Goal is 100 mg BID then Keppra dose gradually titrated down (since patient has history of anxiety and stress)  if patient remains seizure free.   Repeat VEEG 2 hours today.   MRI brain with and without contrast seizure protocol is pending.  Continue medical management, neuro- check and fall precaution.  GI and DVT prophylaxis.    She will benefit from outpatient neurologist at [address] [place] [address] with epilepsy team.     Seizure Precautions reviewed with patient (please reiterate these with the patient upon discharge):  1) No driving for 1 year [place] per [place] law, or for 6 months if approved by physician (patient is not currently driving)  2) Avoid activities that may cause self-harm if there is sudden loss of consciousness for at least 6 months, including but not limited to:  Operating heavy machinery, Standing at heights or near open flames like cooking in kitchen without supervision, Bathing or Swimming alone (patient states he does not participate in these activities).  Patient is counseled about the risk for sudden unexpected death in epilepsy (SUDEP) in the setting of antiepileptic medication noncompliance.    I discussed the diagnosis and treatment plan with the patient and daughter. All questions and concerns were addressed. The patient demonstrated good understanding of the treatment plan.    If you have any further questions, please do not hesitate to contact our team.  Thank you for allowing us to participate in this patient care. Sky  : By myself (Boni Parks MD).  Patient was seen and examined at bedside. During follow up visit, patient's daughter was present and I deeply appreciate that. As per patient and daughter, she is doing fine and slept well. No new seizure or neurologic symptoms were reported.   Non focal exam except asymmetric left pupil is bigger and non reactive and right pupil is reactive.     I agree with the findings and plan as documented in the Resident's note except below.  Etiology of refractory status epilepticus is due to the subtherapeutic antiepileptic medication. Throughout admission clinically she remains stable.   Patient will benefit from case manger or  consult for Home Health Aid since patient lives alone and advise not go to the open flames like cooking in kitchen without supervision.     Vimpat 100 mg BID.     Continue Keppra 1500 mg BID.   Lamictal 25 mg BID after one week she will benefit from increase Lamictal 50 mg BID. Goal is 100 mg BID then Keppra dose gradually titrated down (since patient has history of anxiety and stress) if patient remains seizure free.   Repeat VEEG 2 hours today.   MRI brain with and without contrast seizure protocol is pending.  Continue medical management, neuro- check and fall precaution.  GI and DVT prophylaxis.    She will benefit from outpatient neurologist at [address] [place] [address] with epilepsy team.     Seizure Precautions reviewed with patient (please reiterate these with the patient upon discharge):  1) No driving for 1 year [place] per [place] law, or for 6 months if approved by physician (patient is not currently driving)  2) Avoid activities that may cause self-harm if there is sudden loss of consciousness for at least 6 months, including but not limited to:  Operating heavy machinery, Standing at heights or near open flames like cooking in kitchen without supervision, Bathing or Swimming alone (patient states he does not participate in these activities).  Patient is counseled about the risk for sudden unexpected death in epilepsy (SUDEP) in the setting of antiepileptic medication noncompliance.    I discussed the diagnosis and treatment plan with the patient and daughter. All questions and concerns were addressed. The patient demonstrated good understanding of the treatment plan.    If you have any further questions, please do not hesitate to contact our team.  Thank you for allowing us to participate in this patient care.

## 2023-06-19 LAB
ANION GAP SERPL CALC-SCNC: 14 MMOL/L — SIGNIFICANT CHANGE UP (ref 7–14)
BUN SERPL-MCNC: 16 MG/DL — SIGNIFICANT CHANGE UP (ref 7–23)
CALCIUM SERPL-MCNC: 9.4 MG/DL — SIGNIFICANT CHANGE UP (ref 8.4–10.5)
CHLORIDE SERPL-SCNC: 108 MMOL/L — HIGH (ref 98–107)
CO2 SERPL-SCNC: 20 MMOL/L — LOW (ref 22–31)
CREAT SERPL-MCNC: 1.01 MG/DL — SIGNIFICANT CHANGE UP (ref 0.5–1.3)
EGFR: 63 ML/MIN/1.73M2 — SIGNIFICANT CHANGE UP
GLUCOSE SERPL-MCNC: 99 MG/DL — SIGNIFICANT CHANGE UP (ref 70–99)
HCT VFR BLD CALC: 36.5 % — SIGNIFICANT CHANGE UP (ref 34.5–45)
HGB BLD-MCNC: 11.8 G/DL — SIGNIFICANT CHANGE UP (ref 11.5–15.5)
MAGNESIUM SERPL-MCNC: 2.4 MG/DL — SIGNIFICANT CHANGE UP (ref 1.6–2.6)
MCHC RBC-ENTMCNC: 28.7 PG — SIGNIFICANT CHANGE UP (ref 27–34)
MCHC RBC-ENTMCNC: 32.3 GM/DL — SIGNIFICANT CHANGE UP (ref 32–36)
MCV RBC AUTO: 88.8 FL — SIGNIFICANT CHANGE UP (ref 80–100)
NRBC # BLD: 0 /100 WBCS — SIGNIFICANT CHANGE UP (ref 0–0)
NRBC # FLD: 0 K/UL — SIGNIFICANT CHANGE UP (ref 0–0)
PHOSPHATE SERPL-MCNC: 4.2 MG/DL — SIGNIFICANT CHANGE UP (ref 2.5–4.5)
PLATELET # BLD AUTO: 202 K/UL — SIGNIFICANT CHANGE UP (ref 150–400)
POTASSIUM SERPL-MCNC: 4.3 MMOL/L — SIGNIFICANT CHANGE UP (ref 3.5–5.3)
POTASSIUM SERPL-SCNC: 4.3 MMOL/L — SIGNIFICANT CHANGE UP (ref 3.5–5.3)
RBC # BLD: 4.11 M/UL — SIGNIFICANT CHANGE UP (ref 3.8–5.2)
RBC # FLD: 13.2 % — SIGNIFICANT CHANGE UP (ref 10.3–14.5)
SODIUM SERPL-SCNC: 142 MMOL/L — SIGNIFICANT CHANGE UP (ref 135–145)
WBC # BLD: 6.91 K/UL — SIGNIFICANT CHANGE UP (ref 3.8–10.5)
WBC # FLD AUTO: 6.91 K/UL — SIGNIFICANT CHANGE UP (ref 3.8–10.5)

## 2023-06-19 PROCEDURE — 99233 SBSQ HOSP IP/OBS HIGH 50: CPT

## 2023-06-19 PROCEDURE — 99232 SBSQ HOSP IP/OBS MODERATE 35: CPT

## 2023-06-19 PROCEDURE — 70553 MRI BRAIN STEM W/O & W/DYE: CPT | Mod: 26

## 2023-06-19 RX ADMIN — LACOSAMIDE 120 MILLIGRAM(S): 50 TABLET ORAL at 06:56

## 2023-06-19 RX ADMIN — LEVETIRACETAM 400 MILLIGRAM(S): 250 TABLET, FILM COATED ORAL at 06:24

## 2023-06-19 RX ADMIN — LAMOTRIGINE 25 MILLIGRAM(S): 25 TABLET, ORALLY DISINTEGRATING ORAL at 17:49

## 2023-06-19 RX ADMIN — LAMOTRIGINE 25 MILLIGRAM(S): 25 TABLET, ORALLY DISINTEGRATING ORAL at 06:24

## 2023-06-19 RX ADMIN — POLYETHYLENE GLYCOL 3350 17 GRAM(S): 17 POWDER, FOR SOLUTION ORAL at 22:15

## 2023-06-19 RX ADMIN — LOSARTAN POTASSIUM 100 MILLIGRAM(S): 100 TABLET, FILM COATED ORAL at 06:24

## 2023-06-19 RX ADMIN — LEVETIRACETAM 400 MILLIGRAM(S): 250 TABLET, FILM COATED ORAL at 17:39

## 2023-06-19 RX ADMIN — LACOSAMIDE 120 MILLIGRAM(S): 50 TABLET ORAL at 17:36

## 2023-06-19 NOTE — PROGRESS NOTE ADULT - ATTENDING COMMENTS
Ms. Doty is a 62 yo woman with a h/o epilepsy admitted with status epilepticus - now seizures are well controlled.   I agree with work up and management as above.   F/U with epileptologist as outpatient.  Slowly titrate lamotrigine up as outpatient - increase to 50 mg BID after 2 weeks on 25 BID.   Thank you.

## 2023-06-19 NOTE — BH CONSULTATION LIAISON PROGRESS NOTE - NSBHASSESSMENTFT_PSY_ALL_CORE
SM is a 64 yo woman admitted from [facility] ED for seizures presenting to psychiatry for depression and anxiety. Given that patient was asleep for encounter and history was gathered via daughter, a repeat encounter is required to fully characterize her presentation.     It is not uncommon for patients with epilepsy to suffer from comorbid anxiety/depression. Per daughter's history, patient has a history of anxiety surrounding her son and his future that has impacted her ability to function. She is reportedly more relaxed in the hospital. She appears to be low acute psychiatric risk, but may benefit from continued assessment for anxiety vs. depression vs. panic disorder given her reported history of panic episodes. Further, patient's daughter states that although the patient speaks and understands English, her native language is [place] and she may be more effective communicating via Sky when sedated/medicated. As patient is Anabaptism, can consider offering  services.     Plan  -consider offering  services ([place])   -consider offering language services (Sky)   -consider offering Ativan PRN 1mg for anxiety/agitation  -medical mgmt as you are; will reassess role for psychopharmacological intervention after resolution of seizures/delirium  Dispo: does not meet criteria for inpt psych care; can be given outpt psych resources such as [facility] [place] outpatient clinic or the [facility] on discharge ([phone]). There are no psych contraindications to discharge when medically cleared. Will see on Monday 6/19 if still here.  
62 yo woman admitted from [facility] ED for seizures presenting to psychiatry for depression and anxiety. Given that patient was asleep for encounter and history was gathered via daughter, a repeat encounter is required to fully characterize her presentation.     It is not uncommon for patients with epilepsy to suffer from comorbid anxiety/depression. Per daughter's history, patient has a history of anxiety surrounding her son and his future that has impacted her ability to function. She is reportedly more relaxed in the hospital. She appears to be low acute psychiatric risk, but may benefit from continued assessment for anxiety vs. depression vs. panic disorder given her reported history of panic episodes. Further, patient's daughter states that although the patient speaks and understands English, her native language is [place] and she may be more effective communicating via Sky when sedated/medicated. As patient is Rastafarian, can consider offering  services.     6/19: improved mentation, appears to have concrete/perseverative issues with son (enmeshment, codependence)    Plan:  -no role for standing psychopharmacological intervention at this time (appears to be mostly subsyndromal stress caused/perpetuated by family situation)  -C/W Ativan PRN 1mg for anxiety/agitation  Dispo: does not meet criteria for inpt psych care; can be given outpt psych resources such as [facility] outpatient clinic or the [facility] on discharge ([phone]). There are no psych contraindications to discharge when medically cleared.  - SW/CM consult to evaluate for possible home aid as well as referrals for family therapy.

## 2023-06-19 NOTE — PROGRESS NOTE ADULT - SUBJECTIVE AND OBJECTIVE BOX
SUBJECTIVE/INTERVAL HISTORY:  - Sitting up in bed today, more alert than usual. Feeling well  - Updated daughter at bedside. Pt believes medication from her local pharmacy was not working, which could have contributed to breakthrough seizure episodes    VITALS & EXAMINATION:  Vital Signs Last 24 Hrs  T(C): 36.7 (19 Jun 2023 10:57), Max: 36.9 (18 Jun 2023 19:47)  T(F): 98.1 (19 Jun 2023 10:57), Max: 98.5 (18 Jun 2023 22:00)  HR: 97 (19 Jun 2023 10:57) (80 - 97)  BP: 135/73 (19 Jun 2023 10:57) (127/82 - 138/74)  BP(mean): --  RR: 18 (19 Jun 2023 10:57) (18 - 18)  SpO2: 100% (19 Jun 2023 10:57) (98% - 100%)    Parameters below as of 19 Jun 2023 10:57  Patient On (Oxygen Delivery Method): room air        General:  Constitutional: Obese Female, appears stated age, in no apparent distress including pain  Head: Normocephalic & atraumatic.    Neurological (>12):  Mental status - Awake, Alert, Oriented to location. Speech clear, fluent. Follows simple and complex commands    Cranial nerves - VFF, EOMI, facial strength intact without asymmetry b/l, tongue midline on protrusion with full lateral movement    Motor - Normal bulk and tone throughout. No pronator drift.  Strength testing                Biceps      Triceps              R            5                 5               5                                                   L             5                 5               5                                                           Hip Flexion    Hip Extension        Dorsiflexion    Plantar Flexion  R              5                           5                       5                           5                           L              5                           5                        5                           5                               Sensation - Light touch intact throughout    DTR's -             Biceps      Triceps     Brachioradialis      Patellar    Ankle    Toes/plantar response  R             2+             2+                  2+                       2+            2+                 Down  L              2+             2+                 2+                        2+           2+                 Down    Coordination - Finger to Nose intact b/l. No tremors appreciated    LABORATORY:  CBC                       11.8   6.91  )-----------( 202      ( 19 Jun 2023 06:00 )             36.5     Chem 06-19    142  |  108<H>  |  16  ----------------------------<  99  4.3   |  20<L>  |  1.01    Ca    9.4      19 Jun 2023 06:00  Phos  4.2     06-19  Mg     2.40     06-19      LFTs   Coagulopathy   Lipid Panel 06-14 Chol 171 LDL -- HDL 39<L> Trig 178<H>    STUDIES & IMAGING:  Studies (EKG, EEG, EMG, etc):   EEG 6/18:  Clinical Impression:  Risk of seizures from the left posterior temporal region, none recorded    EEG 6/16:  Clinical Impression:  2 electrographic seizures from the posterior parietal head region (left > right) captured as described, Last clear seizure at 08:04 06/15/23.  Evidence for left posterior temporal cerebral dysfunction  Multiple push button events that are non-epileptic    EEG 6/15:  Clinical Impression:  At least 7 electrographic seizures from the posterior parietal head region (possibly left > right) captured as described, Last clear seizure at 07:58 06/15/23. Neurology made aware.   Evidence for left posterior temporal cerebral dysfunction

## 2023-06-19 NOTE — BH CONSULTATION LIAISON PROGRESS NOTE - MSE UNSTRUCTURED FT
Much improved in terms of mentation, AA O X 3, calm, with no depressed mood/constricted affect. However is concretely perseverative about her son/stressors at home. Denies SI, HI, AVH, or safety concerns.
Pt was somnolent, though oriented, cooperative, appropriate. Daughter translated. Pt denied any severe depression, SI, safety concerns or paranoia at the moment. Understood she needed further medical care.

## 2023-06-19 NOTE — PROGRESS NOTE ADULT - PROBLEM SELECTOR PLAN 3
DVT ppx: Lovenox   Diet: DASH diet   Updated pt's daughter at bedside on 6/18
DVT ppx: Lovenox   Diet: DASH diet   Updated pt's son and daughter at bedside on 6/17
DVT ppx: Lovenox   Diet: DASH diet   Dispo: admitted
DVT ppx: Lovenox   Diet: DASH diet   Updated pt's son and daughter at bedside on 6/16
DVT ppx: Lovenox   Diet: DASH diet   DISPO: Likely home . PT recs outpatient PT.
DVT ppx: Lovenox   Diet: DASH diet   Dispo: admitted

## 2023-06-19 NOTE — BH CONSULTATION LIAISON PROGRESS NOTE - NSBHATTESTBILLING_PSY_A_CORE
05760-Vhgaccvsqt OBS or IP - moderate complexity OR 35-49 mins
39957-Eypvbwkszq OBS or IP - moderate complexity OR 35-49 mins

## 2023-06-19 NOTE — PROGRESS NOTE ADULT - ASSESSMENT
63 y.o. Female  w/ hx  seizure disorder, HTN who presents after seizure. Found to have >1 minute seizure in ED triage. Admitted for seizure treatment and management..

## 2023-06-19 NOTE — EEG REPORT - NS EEG TEXT BOX
MAINI, SUSHMA [id number] 63y (1960)F  Admitting MD: Dr. Macy Gilmore    Study Date: 06-18 13:55-08:00  6-19-23  x17:51hrs  --------------------------------------------------------------------------------------------------  History:  CC/ HPI Patient is a 63y old  Female who presents with a chief complaint of seizures (18 Jun 2023 15:09)    enoxaparin Injectable 40 milliGRAM(s) SubCutaneous every 24 hours  lacosamide IVPB 100 milliGRAM(s) IV Intermittent every 12 hours  lamoTRIgine 25 milliGRAM(s) Oral two times a day  levETIRAcetam  IVPB 1500 milliGRAM(s) IV Intermittent every 12 hours  losartan 100 milliGRAM(s) Oral daily  polyethylene glycol 3350 17 Gram(s) Oral at bedtime    --------------------------------------------------------------------------------------------------  Study Interpretation:    [[[Abbreviation Key:  PDR=alpha rhythm/posterior dominant rhythm. A-P=anterior posterior gradient.  Amplitude: ‘very low’:<20; ‘low’:20-50; ‘medium’:; ‘high’:>200uV.  Persistence for periodic/rhythmic patterns (% of epoch) ‘rare’:<1%; ‘occasional’:1-10%; ‘frequent’:10-50%; ‘abundant’:50-90%; ‘continuous’:>90%.  Persistence for sporadic discharges: ‘rare’:<1/hr; ‘occasional’:1/min-1/hr; ‘frequent’:>1/min; ‘abundant’:>1/10 sec.  GRDA=generalized rhythmic delta activity; FIRDA=frontal intermittent GRDA; LRDA=lateralized rhythmic delta activity; TIRDA=temporal intermittent rhythmic delta activity;  LPD=PLED=lateralized periodic discharges; GPD=generalized periodic discharges; BiPDs=BiPLEDs=bilateral independent periodic epileptiform discharges; SIRPID=stimulus induced rhythmic, periodic, or ictal appearing discharges; BIRDs=brief potentially ictal rhythmic discharges >4 Hz, lasting .5-10s; PFA (paroxysmal bursts >13 Hz or =8 Hz).  Modifiers: +F=with fast component; +S=with spike component; +R=with rhythmic component.  S-B=burst suppression pattern.  Max=maximal. N1-drowsy; N2-stage II sleep; N3-slow wave sleep. SSS/BETS=small sharp spikes/benign epileptiform transients of sleep. HV=hyperventilation; PS=photic stimulation]]]    FINDINGS:  The background was continuous, spontaneously variable and reactive.  During wakefulness, the posteriorly dominant rhythm consisted of symmetric, well modulated 10 Hz activity, with an amplitude to 40 uV, that attenuated to eye opening.  Low amplitude central beta was noted in wakefulness.    Background Slowing:  Generalized slowing: none was present.  Focal slowing: none was present.    Sleep Background:  -Drowsiness was characterized by fragmentation, attenuation, and slowing of the background activity.    -N2 was characterized by the presence of vertex waves, symmetric spindles, and K-complexes.    Epileptiform Activity:   Rare left posterior temporal P7 max sharp wave interictal epileptiform discharges were present.    Events:  No clinical events were recorded.  No seizures were recorded.    Activation Procedures:   -Hyperventilation was not performed.    -Photic stimulation was performed and did not elicit any abnormalities.      Artifacts:  Intermittent myogenic and external motion artifacts were noted.    ECG:  The heart rate on single channel ECG at baseline was predominantly near BPM = 70-80  -----------------------------------------------------------------------------------------------------    EEG Classification / Summary:  Abnormal EEG study, awake / drowsy / asleep    -Rare left posterior temporal P7 max sharp waves   -----------------------------------------------------------------------------------------------------    Clinical Impression:  Risk of seizures from the left posterior temporal region, none recorded    -------------------------------------------------------------------------------------------------------  [facility] EEG Reading Room Ph#: [phone]  Epilepsy Answering Service after 5PM and before 8:30AM: Ph#: [phone]    Ambrosio Shepard M.D.   of Neurology, [facility]ile[facility] [place]	   MAINI, SUSHMA [id number] 63y (1960)F  Admitting MD: Dr. Macy Gilmore    Study Date: 06-18 13:55-10:10  6-19-23  x20hrs  --------------------------------------------------------------------------------------------------  History:  CC/ HPI Patient is a 63y old  Female who presents with a chief complaint of seizures (18 Jun 2023 15:09)    enoxaparin Injectable 40 milliGRAM(s) SubCutaneous every 24 hours  lacosamide IVPB 100 milliGRAM(s) IV Intermittent every 12 hours  lamoTRIgine 25 milliGRAM(s) Oral two times a day  levETIRAcetam  IVPB 1500 milliGRAM(s) IV Intermittent every 12 hours  losartan 100 milliGRAM(s) Oral daily  polyethylene glycol 3350 17 Gram(s) Oral at bedtime    --------------------------------------------------------------------------------------------------  Study Interpretation:    [[[Abbreviation Key:  PDR=alpha rhythm/posterior dominant rhythm. A-P=anterior posterior gradient.  Amplitude: ‘very low’:<20; ‘low’:20-50; ‘medium’:; ‘high’:>200uV.  Persistence for periodic/rhythmic patterns (% of epoch) ‘rare’:<1%; ‘occasional’:1-10%; ‘frequent’:10-50%; ‘abundant’:50-90%; ‘continuous’:>90%.  Persistence for sporadic discharges: ‘rare’:<1/hr; ‘occasional’:1/min-1/hr; ‘frequent’:>1/min; ‘abundant’:>1/10 sec.  GRDA=generalized rhythmic delta activity; FIRDA=frontal intermittent GRDA; LRDA=lateralized rhythmic delta activity; TIRDA=temporal intermittent rhythmic delta activity;  LPD=PLED=lateralized periodic discharges; GPD=generalized periodic discharges; BiPDs=BiPLEDs=bilateral independent periodic epileptiform discharges; SIRPID=stimulus induced rhythmic, periodic, or ictal appearing discharges; BIRDs=brief potentially ictal rhythmic discharges >4 Hz, lasting .5-10s; PFA (paroxysmal bursts >13 Hz or =8 Hz).  Modifiers: +F=with fast component; +S=with spike component; +R=with rhythmic component.  S-B=burst suppression pattern.  Max=maximal. N1-drowsy; N2-stage II sleep; N3-slow wave sleep. SSS/BETS=small sharp spikes/benign epileptiform transients of sleep. HV=hyperventilation; PS=photic stimulation]]]    FINDINGS:  The background was continuous, spontaneously variable and reactive.  During wakefulness, the posteriorly dominant rhythm consisted of symmetric, well modulated 10 Hz activity, with an amplitude to 40 uV, that attenuated to eye opening.  Low amplitude central beta was noted in wakefulness.    Background Slowing:  Generalized slowing: none was present.  Focal slowing: none was present.    Sleep Background:  -Drowsiness was characterized by fragmentation, attenuation, and slowing of the background activity.    -N2 was characterized by the presence of vertex waves, symmetric spindles, and K-complexes.    Epileptiform Activity:   Rare left posterior temporal P7 max sharp wave interictal epileptiform discharges were present.    Events:  No clinical events were recorded.  No seizures were recorded.    Activation Procedures:   -Hyperventilation was not performed.    -Photic stimulation was performed and did not elicit any abnormalities.      Artifacts:  Intermittent myogenic and external motion artifacts were noted.    ECG:  The heart rate on single channel ECG at baseline was predominantly near BPM = 70-80  -----------------------------------------------------------------------------------------------------    EEG Classification / Summary:  Abnormal EEG study, awake / drowsy / asleep    -Rare left posterior temporal P7 max sharp waves   -----------------------------------------------------------------------------------------------------    Clinical Impression:  Risk of seizures from the left posterior temporal region, none recorded    -------------------------------------------------------------------------------------------------------  [facility] EEG Reading Room Ph#: [phone]  Epilepsy Answering Service after 5PM and before 8:30AM: Ph#: [phone]    Ambrosio Shepard M.D.   of Neurology, [facility] Epilepsy [place]

## 2023-06-19 NOTE — PROGRESS NOTE ADULT - PROBLEM SELECTOR PLAN 1
Presents with seizure activity in ED triage for > 1 min, was being weaned off Keppra as outpatient   - neuro following  - c/w Keppra 1500mg bid, Lamictal 25mg PO BID, Vimpat 100mg PO BID  - vEEG + seizures, last 2 seizures on 6/15 at 8 AM  - Repeat EEG w/ Risk of seizures from the left posterior temporal region, though none recorded  - CTH negative for stroke, hemorrhage with no masses or lesions   - MR Brain w/ Scattered nonspecific nonenhancing foci of T2 and FLAIR hyperintense   signal in the cerebral white matter.  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   -Diff includes demyelinating, infectious vs. inflammatory process   -Will discuss w/ neuro  -aspiration, seizure precautions

## 2023-06-19 NOTE — BH CONSULTATION LIAISON PROGRESS NOTE - NSBHCHARTREVIEWVS_PSY_A_CORE FT
Vital Signs Last 24 Hrs  T(C): 37.1 (16 Jun 2023 17:28), Max: 37.1 (16 Jun 2023 17:28)  T(F): 98.7 (16 Jun 2023 17:28), Max: 98.7 (16 Jun 2023 17:28)  HR: 85 (16 Jun 2023 17:28) (69 - 100)  BP: 147/80 (16 Jun 2023 17:28) (129/74 - 149/62)  BP(mean): --  RR: 18 (16 Jun 2023 17:28) (18 - 18)  SpO2: 95% (16 Jun 2023 17:28) (95% - 98%)    Parameters below as of 16 Jun 2023 17:28  Patient On (Oxygen Delivery Method): room air    
Vital Signs Last 24 Hrs  T(C): 36.7 (19 Jun 2023 10:57), Max: 36.9 (18 Jun 2023 19:47)  T(F): 98.1 (19 Jun 2023 10:57), Max: 98.5 (18 Jun 2023 22:00)  HR: 97 (19 Jun 2023 10:57) (80 - 97)  BP: 135/73 (19 Jun 2023 10:57) (127/82 - 138/74)  BP(mean): --  RR: 18 (19 Jun 2023 10:57) (18 - 18)  SpO2: 100% (19 Jun 2023 10:57) (98% - 100%)    Parameters below as of 19 Jun 2023 10:57  Patient On (Oxygen Delivery Method): room air

## 2023-06-19 NOTE — PROGRESS NOTE ADULT - PROBLEM SELECTOR PLAN 2
- c/w losartan 25 mg qd
- c/w losartan 25 mg qd
BP slightly above goal  - increased losartan to 50mg PO qd
BP slightly above goal  - increase losartan to 50mg PO qd
BP slightly above goal  - increased losartan to 100mg PO qd
-c/w losartan 100mg PO qd

## 2023-06-19 NOTE — BH CONSULTATION LIAISON PROGRESS NOTE - NSBHFUPINTERVALHXFT_PSY_A_CORE
Chart reviewed. Pt seen with daughter. Much improved in terms of mentation, AA O X 3, calm, with no depressed mood/constricted affect. However is concretely perseverative about her son/stressors at home. Denies SI, HI, AVH, or safety concerns.
Chart reviewed. Pt seen, daughter at bedside. Pt was somnolent, though oriented, cooperative, appropriate. Daughter translated. Pt denied any severe depression, SI, safety concerns or paranoia at the moment. Understood she needed further medical care. Discussed with daughter that we would hold off on any formal psychiatric interventions until after pt was done with medical w/u, with which they agreed.

## 2023-06-19 NOTE — PROGRESS NOTE ADULT - SUBJECTIVE AND OBJECTIVE BOX
Vicky Holland        Patient is a 63y old  Female who presents with a chief complaint of seizures (19 Jun 2023 11:10)      SUBJECTIVE / OVERNIGHT EVENTS: No acute overnight events. This morning pt w/o complaints    MEDICATIONS  (STANDING):  enoxaparin Injectable 40 milliGRAM(s) SubCutaneous every 24 hours  lacosamide IVPB 100 milliGRAM(s) IV Intermittent every 12 hours  lamoTRIgine 25 milliGRAM(s) Oral two times a day  levETIRAcetam  IVPB 1500 milliGRAM(s) IV Intermittent every 12 hours  losartan 100 milliGRAM(s) Oral daily  polyethylene glycol 3350 17 Gram(s) Oral at bedtime    MEDICATIONS  (PRN):  acetaminophen     Tablet .. 650 milliGRAM(s) Oral every 6 hours PRN Temp greater or equal to 38C (100.4F), Mild Pain (1 - 3)  aluminum hydroxide/magnesium hydroxide/simethicone Suspension 30 milliLiter(s) Oral every 4 hours PRN Dyspepsia  LORazepam     Tablet 1 milliGRAM(s) Oral daily PRN Anxiety/AGITATION  LORazepam   Injectable 1 milliGRAM(s) IV Push every 6 hours PRN seizure activity  melatonin 3 milliGRAM(s) Oral at bedtime PRN Insomnia  ondansetron Injectable 4 milliGRAM(s) IV Push every 8 hours PRN Nausea and/or Vomiting  senna 2 Tablet(s) Oral at bedtime PRN Constipation    Allergies    penicillin (Unknown)    Intolerances        Vital Signs Last 24 Hrs  T(C): 36.7 (19 Jun 2023 10:57), Max: 36.9 (18 Jun 2023 19:47)  T(F): 98.1 (19 Jun 2023 10:57), Max: 98.5 (18 Jun 2023 22:00)  HR: 97 (19 Jun 2023 10:57) (80 - 97)  BP: 135/73 (19 Jun 2023 10:57) (127/82 - 138/74)  BP(mean): --  RR: 18 (19 Jun 2023 10:57) (18 - 18)  SpO2: 100% (19 Jun 2023 10:57) (98% - 100%)    Parameters below as of 19 Jun 2023 10:57  Patient On (Oxygen Delivery Method): room air      Daily     Daily   CAPILLARY BLOOD GLUCOSE        I&O's Summary      PHYSICAL EXAM:  GENERAL: NAD  HEAD:  Atraumatic, Normocephalic  EYES: EOMI, conjunctiva and sclera clear  NECK: Supple  CHEST/LUNG: Clear to auscultation bilaterally; No wheeze, rhonchi, crackles or rales  HEART: Regular rate and rhythm; No murmurs, rubs, or gallops  ABDOMEN: Soft, Nontender, Nondistended; Bowel sounds present  EXTREMITIES:  2+ Peripheral Pulses, No edema  PSYCH: AAOx3  NEUROLOGY: non-focal  SKIN: Warm and dry      LABS:                        11.8   6.91  )-----------( 202      ( 19 Jun 2023 06:00 )             36.5     Hgb Trend: 11.8<--, 11.8<--, 11.6<--, 11.5<--, 10.7<--  06-19    142  |  108<H>  |  16  ----------------------------<  99  4.3   |  20<L>  |  1.01    Ca    9.4      19 Jun 2023 06:00  Phos  4.2     06-19  Mg     2.40     06-19      Creatinine Trend: 1.01<--, 0.97<--, 0.92<--, 0.92<--, 0.92<--, 0.99<--              RADIOLOGY & ADDITIONAL TESTS:    Imaging Personally Reviewed.    Consultant(s) Notes Reviewed.    Care Discussed with Consultants/Other Providers.

## 2023-06-19 NOTE — BH CONSULTATION LIAISON PROGRESS NOTE - CURRENT MEDICATION
MEDICATIONS  (STANDING):  enoxaparin Injectable 40 milliGRAM(s) SubCutaneous every 24 hours  lacosamide IVPB 100 milliGRAM(s) IV Intermittent every 12 hours  lamoTRIgine 25 milliGRAM(s) Oral two times a day  levETIRAcetam  IVPB 1500 milliGRAM(s) IV Intermittent every 12 hours  polyethylene glycol 3350 17 Gram(s) Oral at bedtime    MEDICATIONS  (PRN):  acetaminophen     Tablet .. 650 milliGRAM(s) Oral every 6 hours PRN Temp greater or equal to 38C (100.4F), Mild Pain (1 - 3)  aluminum hydroxide/magnesium hydroxide/simethicone Suspension 30 milliLiter(s) Oral every 4 hours PRN Dyspepsia  LORazepam     Tablet 1 milliGRAM(s) Oral daily PRN Anxiety/AGITATION  LORazepam   Injectable 1 milliGRAM(s) IV Push every 6 hours PRN seizure activity  melatonin 3 milliGRAM(s) Oral at bedtime PRN Insomnia  ondansetron Injectable 4 milliGRAM(s) IV Push every 8 hours PRN Nausea and/or Vomiting  senna 2 Tablet(s) Oral at bedtime PRN Constipation  
MEDICATIONS  (STANDING):  enoxaparin Injectable 40 milliGRAM(s) SubCutaneous every 24 hours  lacosamide IVPB 100 milliGRAM(s) IV Intermittent every 12 hours  lamoTRIgine 25 milliGRAM(s) Oral two times a day  levETIRAcetam  IVPB 1500 milliGRAM(s) IV Intermittent every 12 hours  losartan 100 milliGRAM(s) Oral daily  polyethylene glycol 3350 17 Gram(s) Oral at bedtime    MEDICATIONS  (PRN):  acetaminophen     Tablet .. 650 milliGRAM(s) Oral every 6 hours PRN Temp greater or equal to 38C (100.4F), Mild Pain (1 - 3)  aluminum hydroxide/magnesium hydroxide/simethicone Suspension 30 milliLiter(s) Oral every 4 hours PRN Dyspepsia  LORazepam     Tablet 1 milliGRAM(s) Oral daily PRN Anxiety/AGITATION  LORazepam   Injectable 1 milliGRAM(s) IV Push every 6 hours PRN seizure activity  melatonin 3 milliGRAM(s) Oral at bedtime PRN Insomnia  ondansetron Injectable 4 milliGRAM(s) IV Push every 8 hours PRN Nausea and/or Vomiting  senna 2 Tablet(s) Oral at bedtime PRN Constipation

## 2023-06-19 NOTE — PROGRESS NOTE ADULT - ASSESSMENT
63y (1960) woman, Sky speaking, PMH HTN on losartan, epilepsy on Keppra, who presents to [facility] ED for seizure. Had 60 seconds GTC at home with urinary incontinence. Takes home Keppra inconsistently, supposed to take 500 mg bid but has been taking 500 mg in AM. Sees Dr. Mara Clancy at outpatient neurology. Exam normal. Prelim EEG reports 2 electrographic seizures from the posterior parietal head region (left > right) captured as described, Last clear seizure at 08:04 06/15/23. Evidence for left posterior temporal cerebral dysfunction.     Impression:  Refractory status epilepticus (now resolved) due to the subtherapeutic antiepileptic medication    Recommendation:   [] c/w Vimpat 100 BID, Keppra 1500 mg BID, Lamictal 25 mg bid   [x] DC'ed 6/19   [] MRI brain w/wo epilepsy protocol   [x] On 6/15/23 s/p: Ativan 4 mg IV, Vimpat 200 mg load once, Keppra 1500 mg load  [] Neurocheck and vital q4  [] PT/OT  [] if patient has a convulsion, please document accurately the length of the episode, and specifically what the patient was doing paying attention to eye opening vs closure, gaze deviation, shaking of extremities, tongue bite, urinary incontinence, any derangement of vital signs  [] If patient has a generalized tonic clonic episode for >3 minutes or significant derangement of vital signs may administer Ativan 1mg IV and call [phone] for further reocmmendations  [] advise patient to not drive, operate heavy machinery, avoid heights, pools, bathtubs, locked doors.  [] Follow outpatient upon discharge at [address] [place] [address] with Dr. Flavio Vasquez   [] Consider asking pt for new pharmacy that she would like to use to help with medication adherence    Case seen and discussed with general neurology attending Dr. Austin

## 2023-06-20 ENCOUNTER — TRANSCRIPTION ENCOUNTER (OUTPATIENT)
Age: 63
End: 2023-06-20

## 2023-06-20 VITALS — WEIGHT: 119.93 LBS

## 2023-06-20 PROCEDURE — 99239 HOSP IP/OBS DSCHRG MGMT >30: CPT

## 2023-06-20 PROCEDURE — 99233 SBSQ HOSP IP/OBS HIGH 50: CPT

## 2023-06-20 RX ORDER — LOSARTAN POTASSIUM 100 MG/1
0 TABLET, FILM COATED ORAL
Qty: 0 | Refills: 0 | DISCHARGE

## 2023-06-20 RX ORDER — LEVETIRACETAM 250 MG/1
0 TABLET, FILM COATED ORAL
Qty: 0 | Refills: 0 | DISCHARGE

## 2023-06-20 RX ORDER — LACOSAMIDE 50 MG/1
150 TABLET ORAL
Refills: 0 | Status: DISCONTINUED | OUTPATIENT
Start: 2023-06-20 | End: 2023-06-20

## 2023-06-20 RX ORDER — SENNA PLUS 8.6 MG/1
2 TABLET ORAL
Qty: 0 | Refills: 0 | DISCHARGE
Start: 2023-06-20

## 2023-06-20 RX ORDER — LEVETIRACETAM 250 MG/1
2 TABLET, FILM COATED ORAL
Qty: 120 | Refills: 0
Start: 2023-06-20 | End: 2023-07-19

## 2023-06-20 RX ORDER — LACOSAMIDE 50 MG/1
1 TABLET ORAL
Qty: 60 | Refills: 0
Start: 2023-06-20 | End: 2023-07-19

## 2023-06-20 RX ORDER — LACOSAMIDE 50 MG/1
50 TABLET ORAL ONCE
Refills: 0 | Status: DISCONTINUED | OUTPATIENT
Start: 2023-06-20 | End: 2023-06-20

## 2023-06-20 RX ORDER — LANOLIN ALCOHOL/MO/W.PET/CERES
1 CREAM (GRAM) TOPICAL
Qty: 0 | Refills: 0 | DISCHARGE
Start: 2023-06-20

## 2023-06-20 RX ORDER — LOSARTAN POTASSIUM 100 MG/1
1 TABLET, FILM COATED ORAL
Qty: 30 | Refills: 0
Start: 2023-06-20 | End: 2023-07-19

## 2023-06-20 RX ORDER — LAMOTRIGINE 25 MG/1
1 TABLET, ORALLY DISINTEGRATING ORAL
Qty: 60 | Refills: 0
Start: 2023-06-20 | End: 2023-07-19

## 2023-06-20 RX ORDER — POLYETHYLENE GLYCOL 3350 17 G/17G
17 POWDER, FOR SOLUTION ORAL
Qty: 0 | Refills: 0 | DISCHARGE
Start: 2023-06-20

## 2023-06-20 RX ADMIN — LEVETIRACETAM 400 MILLIGRAM(S): 250 TABLET, FILM COATED ORAL at 07:23

## 2023-06-20 RX ADMIN — LACOSAMIDE 50 MILLIGRAM(S): 50 TABLET ORAL at 11:19

## 2023-06-20 RX ADMIN — LAMOTRIGINE 25 MILLIGRAM(S): 25 TABLET, ORALLY DISINTEGRATING ORAL at 07:23

## 2023-06-20 RX ADMIN — LOSARTAN POTASSIUM 100 MILLIGRAM(S): 100 TABLET, FILM COATED ORAL at 07:22

## 2023-06-20 RX ADMIN — LACOSAMIDE 120 MILLIGRAM(S): 50 TABLET ORAL at 08:35

## 2023-06-20 NOTE — DIETITIAN INITIAL EVALUATION ADULT - NS FNS DIET ORDER
Diet, DASH/TLC:   Sodium & Cholesterol Restricted  Lacto Veg (Accepts Milk & Milk Products) (06-13-23 @ 11:25)

## 2023-06-20 NOTE — DIETITIAN INITIAL EVALUATION ADULT - ADD RECOMMEND
1) Monitor weights, PO intake/diet tolerance, skin integrity, pertinent labs.   2) Please consistently document % PO intake in nursing flowsheets to assess adequacy of nutritional intake/monitor need for further nutritional intervention(s).   3) Consider multivitamin tablet daily if medically appropriate.

## 2023-06-20 NOTE — DISCHARGE NOTE NURSING/CASE MANAGEMENT/SOCIAL WORK - NSDCPEFALRISK_GEN_ALL_CORE
For information on Fall & Injury Prevention, visit: [email or web address] OR  [email or web address] OR  https://www.cdc.gov/steadi/patient.html

## 2023-06-20 NOTE — DIETITIAN INITIAL EVALUATION ADULT - OTHER INFO
RD visited with patient for length of stay.  Patient declined  services. She stated she wanted her son to translate if needed (patient was communicating primarily in English); son was present at time of visit, as well as pt's daughter.  Patient endorses a good appetite. Denies having any chewing or swallowing difficulties. No reported GI distress i.e. nausea, vomiting, diarrhea.  Patient and son had several questions above various foods and health related effects on the body. Reviewed DASH/TLC (cholesterol and sodium restricted) diet with patient and family members; patient vegetarian and accepts dairy products.  Reviewed individualized nutritional recommendations with cultural considerations regarding prescribed diet; reviewed nutritional recommendations to ensure patient gets enough protein in diet given she is a vegetarian.  Son then further asked about a "special diet" i.e. Ketogenic diet, to prevent seizures which RD explained would have to be deferred to the discretion of a neurologist in the outpatient setting and that same would not be recommended unless under physician discretion/recommendation.     No reported weight changes PTA. Patient reported usual body weight ~120-124 pounds PTA.  Patient indicated she was mindful of sodium intake, as well as concentrated sweets consumption.

## 2023-06-20 NOTE — PROGRESS NOTE ADULT - ATTENDING COMMENTS
Ms. Doty is a 64 yo woman with a h/o epilepsy admitted with status epilepticus - now seizures are well controlled but may have had a brief seizure last night.   I agree with management as above.   F/U with epileptologist as outpatient.  I counselled the patient regarding avoiding driving, operating heavy machinery, working at heights or doing any other activity which would harm the patient or others if she were to have a seizure.  I discussed SUDEP with the patient and family.   I discussed her diagnosis, prognosis and treatment plan with the patient and 2 children in detail and answered all of their questions.   I counselled the patient regarding the importance of compliance with antiseizure medications and f/u with an epileptologist.   Neurology signing off. Please reconsult PRN or call [facility] [phone] with any questions.  Thank you.

## 2023-06-20 NOTE — PROGRESS NOTE ADULT - ASSESSMENT
63y (1960) woman, Sky speaking, PMH HTN on losartan, epilepsy on Keppra, who presents to [facility] ED for seizure. Had 60 seconds GTC at home with urinary incontinence. Takes home Keppra inconsistently, supposed to take 500 mg bid but has been taking 500 mg in AM; possibly taking Advil instead of Keppra. Saw Dr. Mara Clancy at outpatient neurology. Exam normal. EEG reports several electrographic seizures from the posterior parietal head region (left > right) captured as described, Last clear seizure at 08:04 06/15/23. Evidence for left posterior temporal cerebral dysfunction.     Impression:  Refractory status epilepticus (now resolved) due to the subtherapeutic antiepileptic medication    Recommendation:   [] increase Vimpat 100 BID to 150mg BID; discharge on vimpat 150mg BID  [] c/w Keppra 1500 mg BID, Lamictal 25 mg BID and ok to discharge on these doses  [x] KS'ed 6/19   [x] MRI brain w/wo epilepsy protocol - mild white matter changes  [x] On 6/15/23 s/p: Ativan 4 mg IV, Vimpat 200 mg load once, Keppra 1500 mg load  [x] advised patient to not drive, operate heavy machinery, avoid heights, pools, bathtubs, locked doors. SUDEP discussed with pt.   [] Follow outpatient upon discharge at [address] [place] [address] with Dr. Flavio Vasquez   [] Consider asking pt for new pharmacy that she would like to use to help with medication adherence    Case seen and discussed with general neurology attending Dr. Austin     63y (1960) woman, Sky speaking, PMH HTN on losartan, epilepsy on Keppra, who presents to [facility] ED for seizure. Had 60 seconds GTC at home with urinary incontinence. Takes home Keppra inconsistently, supposed to take 500 mg bid but has been taking 500 mg in AM; possibly taking Advil instead of Keppra. Saw Dr. Mara Clancy at outpatient neurology. Exam normal. EEG reports several electrographic seizures from the posterior parietal head region (left > right) captured as described, Last clear seizure at 08:04 06/15/23. Evidence for left posterior temporal cerebral dysfunction.     Impression:  Refractory status epilepticus (now resolved) due to the subtherapeutic antiepileptic medication    Recommendation:   [] increase Vimpat 100 BID to 150mg BID; discharge on vimpat 150mg BID  [] c/w Keppra 1500 mg BID, Lamictal 25 mg BID and ok to discharge on these doses. Slowly titrate lamotrigine up as outpatient - increase to 50 mg BID after 2 weeks on 25 BID.   [x] DC'ed 6/19   [x] MRI brain w/wo epilepsy protocol - mild white matter changes  [x] On 6/15/23 s/p: Ativan 4 mg IV, Vimpat 200 mg load once, Keppra 1500 mg load  [x] advised patient to not drive, operate heavy machinery, avoid heights, pools, bathtubs, locked doors. SUDEP discussed with pt.   [] Follow outpatient upon discharge at [address] [place] [address] with Dr. Flavio Vasquez   [] Consider asking pt for new pharmacy that she would like to use to help with medication adherence    Case seen and discussed with general neurology attending Dr. Austin

## 2023-06-20 NOTE — DISCHARGE NOTE NURSING/CASE MANAGEMENT/SOCIAL WORK - NSDCFUADDAPPT_GEN_ALL_CORE_FT
Follow up [facility] Neurologist Trenton Mora or Ambrosio Shepard.    [facility] outpatient clinic or the Crisis [facility] on discharge ([phone]).

## 2023-06-20 NOTE — DIETITIAN INITIAL EVALUATION ADULT - PERTINENT MEDS FT
MEDICATIONS  (STANDING):  enoxaparin Injectable 40 milliGRAM(s) SubCutaneous every 24 hours  lacosamide 150 milliGRAM(s) Oral two times a day  lamoTRIgine 25 milliGRAM(s) Oral two times a day  levETIRAcetam  IVPB 1500 milliGRAM(s) IV Intermittent every 12 hours  losartan 100 milliGRAM(s) Oral daily  polyethylene glycol 3350 17 Gram(s) Oral at bedtime    MEDICATIONS  (PRN):  acetaminophen     Tablet .. 650 milliGRAM(s) Oral every 6 hours PRN Temp greater or equal to 38C (100.4F), Mild Pain (1 - 3)  aluminum hydroxide/magnesium hydroxide/simethicone Suspension 30 milliLiter(s) Oral every 4 hours PRN Dyspepsia  LORazepam     Tablet 1 milliGRAM(s) Oral daily PRN Anxiety/AGITATION  LORazepam   Injectable 1 milliGRAM(s) IV Push every 6 hours PRN seizure activity  melatonin 3 milliGRAM(s) Oral at bedtime PRN Insomnia  ondansetron Injectable 4 milliGRAM(s) IV Push every 8 hours PRN Nausea and/or Vomiting  senna 2 Tablet(s) Oral at bedtime PRN Constipation

## 2023-06-20 NOTE — DISCHARGE NOTE NURSING/CASE MANAGEMENT/SOCIAL WORK - NSDCVIVACCINE_GEN_ALL_CORE_FT
Tdap; 18-Oct-2021 03:29; Nancy Guy (RN); [employer]; [id number] (Exp. Date: 09-Sep-2023); IntraMuscular; Deltoid Right.; 0.5 milliLiter(s); VIS (VIS Published: 09-May-2013, VIS Presented: 18-Oct-2021);

## 2023-06-20 NOTE — PROGRESS NOTE ADULT - REASON FOR ADMISSION
seizures

## 2023-06-20 NOTE — PROGRESS NOTE ADULT - PROVIDER SPECIALTY LIST ADULT
Neurology
Hospitalist
Neurology
Hospitalist
Internal Medicine
Hospitalist

## 2023-06-20 NOTE — PROGRESS NOTE ADULT - SUBJECTIVE AND OBJECTIVE BOX
SUBJECTIVE/INTERVAL HISTORY:  - Sitting up in bed today, more alert than usual. Feeling well  - Updated daughter and son at bedside. Pt reported an episode of possible aura (abdominal sensation, unable to describe further) to daughter yesterday. No breakthrough tonic/clonic seizures noted  - Pt reports to primary team that she was taking Advil instead of keppra    PAST MEDICAL & SURGICAL HISTORY:  Seizure      Hypertension      History of cholecystectomy        FAMILY HISTORY:  No pertinent family history in first degree relatives        MEDICATIONS (HOME):  Home Medications:  melatonin 3 mg oral tablet: 1 tab(s) orally once a day (at bedtime) As needed Insomnia (20 Jun 2023 10:45)  polyethylene glycol 3350 oral powder for reconstitution: 17 gram(s) orally once a day (at bedtime) (20 Jun 2023 10:45)  senna leaf extract oral tablet: 2 tab(s) orally once a day (at bedtime) As needed Constipation (20 Jun 2023 10:45)    MEDICATIONS  (STANDING):  enoxaparin Injectable 40 milliGRAM(s) SubCutaneous every 24 hours  lacosamide 50 milliGRAM(s) Oral once  lacosamide 150 milliGRAM(s) Oral two times a day  lamoTRIgine 25 milliGRAM(s) Oral two times a day  levETIRAcetam  IVPB 1500 milliGRAM(s) IV Intermittent every 12 hours  losartan 100 milliGRAM(s) Oral daily  polyethylene glycol 3350 17 Gram(s) Oral at bedtime    MEDICATIONS  (PRN):  acetaminophen     Tablet .. 650 milliGRAM(s) Oral every 6 hours PRN Temp greater or equal to 38C (100.4F), Mild Pain (1 - 3)  aluminum hydroxide/magnesium hydroxide/simethicone Suspension 30 milliLiter(s) Oral every 4 hours PRN Dyspepsia  LORazepam     Tablet 1 milliGRAM(s) Oral daily PRN Anxiety/AGITATION  LORazepam   Injectable 1 milliGRAM(s) IV Push every 6 hours PRN seizure activity  melatonin 3 milliGRAM(s) Oral at bedtime PRN Insomnia  ondansetron Injectable 4 milliGRAM(s) IV Push every 8 hours PRN Nausea and/or Vomiting  senna 2 Tablet(s) Oral at bedtime PRN Constipation    ALLERGIES/INTOLERANCES:  Allergies  penicillin (Unknown)    Intolerances    VITALS & EXAMINATION:  Vital Signs Last 24 Hrs  T(C): 36.6 (20 Jun 2023 09:55), Max: 36.7 (20 Jun 2023 02:02)  T(F): 97.9 (20 Jun 2023 09:55), Max: 98 (20 Jun 2023 02:02)  HR: 80 (20 Jun 2023 09:55) (75 - 89)  BP: 146/66 (20 Jun 2023 09:55) (115/65 - 146/66)  BP(mean): --  RR: 18 (20 Jun 2023 09:55) (17 - 18)  SpO2: 100% (20 Jun 2023 09:55) (99% - 100%)    Parameters below as of 20 Jun 2023 09:55  Patient On (Oxygen Delivery Method): room air          General:  Constitutional: Obese Female, appears stated age, in no apparent distress including pain  Head: Normocephalic & atraumatic.    Neurological (>12):  Mental status - Awake, Alert, Oriented to location. Speech clear, fluent. Follows simple and complex commands    Cranial nerves - VFF, EOMI, facial strength intact without asymmetry b/l, tongue midline on protrusion with full lateral movement    Motor - Normal bulk and tone throughout. No pronator drift    Sensation - Light touch intact throughout    Coordination - Finger to Nose intact b/l. No tremors appreciated    LABORATORY:  CBC                       11.8   6.91  )-----------( 202      ( 19 Jun 2023 06:00 )             36.5     Chem 06-19    142  |  108<H>  |  16  ----------------------------<  99  4.3   |  20<L>  |  1.01    Ca    9.4      19 Jun 2023 06:00  Phos  4.2     06-19  Mg     2.40     06-19     Lipid Panel 06-14 Chol 171 LDL -- HDL 39<L> Trig 178<H>     STUDIES & IMAGING:  Studies (EKG, EEG, EMG, etc):   EEG 6/18:  Clinical Impression:  Risk of seizures from the left posterior temporal region, none recorded    EEG 6/16:  Clinical Impression:  2 electrographic seizures from the posterior parietal head region (left > right) captured as described, Last clear seizure at 08:04 06/15/23.  Evidence for left posterior temporal cerebral dysfunction  Multiple push button events that are non-epileptic    EEG 6/15:  Clinical Impression:  At least 7 electrographic seizures from the posterior parietal head region (possibly left > right) captured as described, Last clear seizure at 07:58 06/15/23. Neurology made aware.   Evidence for left posterior temporal cerebral dysfunction    < from: MR Brain-Seizure, Epilepsy w/wo IV Cont (06.19.23 @ 12:39) >  IMPRESSION:    No hydrocephalus, mass effect, acute intracranial hemorrhage, vasogenic   edema, or acute territorial infarct.    Scattered nonspecific nonenhancing foci of T2 and FLAIR hyperintense   signal in the cerebral white matter.  Statistically, white matter   microvascular ischemic disease is favored. Differential diagnosis   includes demyelinating, infectious, inflammatory, and post   infectious/inflammatory processes.    No abnormal parenchymal or leptomeningeal enhancement.    No evidence for mesial temporal sclerosis, gray matter heterotopia, or   cortical dysplasia.    < end of copied text >   SUBJECTIVE/INTERVAL HISTORY:  - Sitting up in bed today, more alert than usual. Feeling well  - Updated daughter and son at bedside. Pt reported an episode of possible aura (abdominal sensation, unable to describe further) to daughter yesterday. No breakthrough tonic/clonic seizures noted  - Pt reports to primary team that she was taking Advil instead of keppra  son says she had a few seconds of arm shaking.     PAST MEDICAL & SURGICAL HISTORY:  Seizure      Hypertension      History of cholecystectomy        FAMILY HISTORY:  No pertinent family history in first degree relatives        MEDICATIONS (HOME):  Home Medications:  melatonin 3 mg oral tablet: 1 tab(s) orally once a day (at bedtime) As needed Insomnia (20 Jun 2023 10:45)  polyethylene glycol 3350 oral powder for reconstitution: 17 gram(s) orally once a day (at bedtime) (20 Jun 2023 10:45)  senna leaf extract oral tablet: 2 tab(s) orally once a day (at bedtime) As needed Constipation (20 Jun 2023 10:45)    MEDICATIONS  (STANDING):  enoxaparin Injectable 40 milliGRAM(s) SubCutaneous every 24 hours  lacosamide 50 milliGRAM(s) Oral once  lacosamide 150 milliGRAM(s) Oral two times a day  lamoTRIgine 25 milliGRAM(s) Oral two times a day  levETIRAcetam  IVPB 1500 milliGRAM(s) IV Intermittent every 12 hours  losartan 100 milliGRAM(s) Oral daily  polyethylene glycol 3350 17 Gram(s) Oral at bedtime    MEDICATIONS  (PRN):  acetaminophen     Tablet .. 650 milliGRAM(s) Oral every 6 hours PRN Temp greater or equal to 38C (100.4F), Mild Pain (1 - 3)  aluminum hydroxide/magnesium hydroxide/simethicone Suspension 30 milliLiter(s) Oral every 4 hours PRN Dyspepsia  LORazepam     Tablet 1 milliGRAM(s) Oral daily PRN Anxiety/AGITATION  LORazepam   Injectable 1 milliGRAM(s) IV Push every 6 hours PRN seizure activity  melatonin 3 milliGRAM(s) Oral at bedtime PRN Insomnia  ondansetron Injectable 4 milliGRAM(s) IV Push every 8 hours PRN Nausea and/or Vomiting  senna 2 Tablet(s) Oral at bedtime PRN Constipation    ALLERGIES/INTOLERANCES:  Allergies  penicillin (Unknown)    Intolerances    VITALS & EXAMINATION:  Vital Signs Last 24 Hrs  T(C): 36.6 (20 Jun 2023 09:55), Max: 36.7 (20 Jun 2023 02:02)  T(F): 97.9 (20 Jun 2023 09:55), Max: 98 (20 Jun 2023 02:02)  HR: 80 (20 Jun 2023 09:55) (75 - 89)  BP: 146/66 (20 Jun 2023 09:55) (115/65 - 146/66)  BP(mean): --  RR: 18 (20 Jun 2023 09:55) (17 - 18)  SpO2: 100% (20 Jun 2023 09:55) (99% - 100%)    Parameters below as of 20 Jun 2023 09:55  Patient On (Oxygen Delivery Method): room air          General:  Constitutional: Obese Female, appears stated age, in no apparent distress including pain  Head: Normocephalic & atraumatic.    Neurological (>12):  Mental status - Awake, Alert, Oriented to location. Speech clear, fluent. Follows simple and complex commands    Cranial nerves - VFF, EOMI, facial strength intact without asymmetry b/l, tongue midline on protrusion with full lateral movement    Motor - Normal bulk and tone throughout. No pronator drift    Sensation - Light touch intact throughout    Coordination - Finger to Nose intact b/l. No tremors appreciated    LABORATORY:  CBC                       11.8   6.91  )-----------( 202      ( 19 Jun 2023 06:00 )             36.5     Chem 06-19    142  |  108<H>  |  16  ----------------------------<  99  4.3   |  20<L>  |  1.01    Ca    9.4      19 Jun 2023 06:00  Phos  4.2     06-19  Mg     2.40     06-19     Lipid Panel 06-14 Chol 171 LDL -- HDL 39<L> Trig 178<H>     STUDIES & IMAGING:  Studies (EKG, EEG, EMG, etc):   EEG 6/18:  Clinical Impression:  Risk of seizures from the left posterior temporal region, none recorded    EEG 6/16:  Clinical Impression:  2 electrographic seizures from the posterior parietal head region (left > right) captured as described, Last clear seizure at 08:04 06/15/23.  Evidence for left posterior temporal cerebral dysfunction  Multiple push button events that are non-epileptic    EEG 6/15:  Clinical Impression:  At least 7 electrographic seizures from the posterior parietal head region (possibly left > right) captured as described, Last clear seizure at 07:58 06/15/23. Neurology made aware.   Evidence for left posterior temporal cerebral dysfunction    < from: MR Brain-Seizure, Epilepsy w/wo IV Cont (06.19.23 @ 12:39) >  IMPRESSION:    No hydrocephalus, mass effect, acute intracranial hemorrhage, vasogenic   edema, or acute territorial infarct.    Scattered nonspecific nonenhancing foci of T2 and FLAIR hyperintense   signal in the cerebral white matter.  Statistically, white matter   microvascular ischemic disease is favored. Differential diagnosis   includes demyelinating, infectious, inflammatory, and post   infectious/inflammatory processes.    No abnormal parenchymal or leptomeningeal enhancement.    No evidence for mesial temporal sclerosis, gray matter heterotopia, or   cortical dysplasia.    < end of copied text >

## 2023-06-20 NOTE — DIETITIAN INITIAL EVALUATION ADULT - PERTINENT LABORATORY DATA
06-19    142  |  108<H>  |  16  ----------------------------<  99  4.3   |  20<L>  |  1.01    Ca    9.4      19 Jun 2023 06:00  Phos  4.2     06-19  Mg     2.40     06-19    A1C with Estimated Average Glucose Result: 5.6 % (06-14-23 @ 06:35)   normal...

## 2023-06-21 PROBLEM — I10 ESSENTIAL (PRIMARY) HYPERTENSION: Chronic | Status: ACTIVE | Noted: 2023-06-13

## 2023-06-27 ENCOUNTER — APPOINTMENT (OUTPATIENT)
Dept: NEUROLOGY | Facility: CLINIC | Age: 63
End: 2023-06-27
Payer: MEDICAID

## 2023-06-27 VITALS — OXYGEN SATURATION: 98 % | SYSTOLIC BLOOD PRESSURE: 126 MMHG | HEART RATE: 94 BPM | DIASTOLIC BLOOD PRESSURE: 76 MMHG

## 2023-06-27 VITALS
HEIGHT: 60 IN | SYSTOLIC BLOOD PRESSURE: 150 MMHG | WEIGHT: 122 LBS | OXYGEN SATURATION: 99 % | BODY MASS INDEX: 23.95 KG/M2 | TEMPERATURE: 98 F | HEART RATE: 98 BPM | DIASTOLIC BLOOD PRESSURE: 76 MMHG

## 2023-06-27 PROCEDURE — 99214 OFFICE O/P EST MOD 30 MIN: CPT

## 2023-07-03 ENCOUNTER — NON-APPOINTMENT (OUTPATIENT)
Age: 63
End: 2023-07-03

## 2023-07-03 LAB
ALBUMIN SERPL ELPH-MCNC: 4.4 G/DL
ALP BLD-CCNC: 124 U/L
ALT SERPL-CCNC: 21 U/L
ANION GAP SERPL CALC-SCNC: 11 MMOL/L
AST SERPL-CCNC: 17 U/L
BILIRUB SERPL-MCNC: 0.3 MG/DL
BUN SERPL-MCNC: 17 MG/DL
CALCIUM SERPL-MCNC: 9.7 MG/DL
CHLORIDE SERPL-SCNC: 109 MMOL/L
CO2 SERPL-SCNC: 26 MMOL/L
CREAT SERPL-MCNC: 1.1 MG/DL
EGFR: 57 ML/MIN/1.73M2
GLUCOSE SERPL-MCNC: 103 MG/DL
POTASSIUM SERPL-SCNC: 4.9 MMOL/L
PROT SERPL-MCNC: 7.5 G/DL
SODIUM SERPL-SCNC: 145 MMOL/L

## 2023-07-07 LAB
LAMOTRIGINE SERPL-MCNC: 2.3 UG/ML
LEVETIRACETAM SERPL-MCNC: 70.3 UG/ML

## 2023-07-08 ENCOUNTER — EMERGENCY (EMERGENCY)
Facility: HOSPITAL | Age: 63
LOS: 1 days | Discharge: HOME | End: 2023-07-08
Admitting: EMERGENCY MEDICINE
Payer: MEDICAID

## 2023-07-08 VITALS
TEMPERATURE: 98 F | RESPIRATION RATE: 16 BRPM | DIASTOLIC BLOOD PRESSURE: 78 MMHG | SYSTOLIC BLOOD PRESSURE: 148 MMHG | OXYGEN SATURATION: 100 % | HEART RATE: 89 BPM

## 2023-07-08 VITALS
OXYGEN SATURATION: 100 % | HEIGHT: 61 IN | DIASTOLIC BLOOD PRESSURE: 80 MMHG | SYSTOLIC BLOOD PRESSURE: 149 MMHG | TEMPERATURE: 98 F | RESPIRATION RATE: 17 BRPM | HEART RATE: 90 BPM

## 2023-07-08 DIAGNOSIS — Z90.49 ACQUIRED ABSENCE OF OTHER SPECIFIED PARTS OF DIGESTIVE TRACT: Chronic | ICD-10-CM

## 2023-07-08 LAB
A1C WITH ESTIMATED AVERAGE GLUCOSE RESULT: 5.8 % — HIGH (ref 4–5.6)
ALBUMIN SERPL ELPH-MCNC: 4.6 G/DL — SIGNIFICANT CHANGE UP (ref 3.3–5)
ALP SERPL-CCNC: 119 U/L — SIGNIFICANT CHANGE UP (ref 40–120)
ALT FLD-CCNC: 22 U/L — SIGNIFICANT CHANGE UP (ref 4–33)
ANION GAP SERPL CALC-SCNC: 11 MMOL/L — SIGNIFICANT CHANGE UP (ref 7–14)
APPEARANCE UR: CLEAR — SIGNIFICANT CHANGE UP
AST SERPL-CCNC: 18 U/L — SIGNIFICANT CHANGE UP (ref 4–32)
BASOPHILS # BLD AUTO: 0.03 K/UL — SIGNIFICANT CHANGE UP (ref 0–0.2)
BASOPHILS NFR BLD AUTO: 0.4 % — SIGNIFICANT CHANGE UP (ref 0–2)
BILIRUB SERPL-MCNC: <0.2 MG/DL — SIGNIFICANT CHANGE UP (ref 0.2–1.2)
BILIRUB UR-MCNC: NEGATIVE — SIGNIFICANT CHANGE UP
BUN SERPL-MCNC: 20 MG/DL — SIGNIFICANT CHANGE UP (ref 7–23)
CALCIUM SERPL-MCNC: 9.3 MG/DL — SIGNIFICANT CHANGE UP (ref 8.4–10.5)
CHLORIDE SERPL-SCNC: 102 MMOL/L — SIGNIFICANT CHANGE UP (ref 98–107)
CO2 SERPL-SCNC: 25 MMOL/L — SIGNIFICANT CHANGE UP (ref 22–31)
COLOR SPEC: YELLOW — SIGNIFICANT CHANGE UP
CREAT SERPL-MCNC: 0.99 MG/DL — SIGNIFICANT CHANGE UP (ref 0.5–1.3)
DIFF PNL FLD: NEGATIVE — SIGNIFICANT CHANGE UP
EGFR: 64 ML/MIN/1.73M2 — SIGNIFICANT CHANGE UP
EOSINOPHIL # BLD AUTO: 0.08 K/UL — SIGNIFICANT CHANGE UP (ref 0–0.5)
EOSINOPHIL NFR BLD AUTO: 1.2 % — SIGNIFICANT CHANGE UP (ref 0–6)
ESTIMATED AVERAGE GLUCOSE: 120 — SIGNIFICANT CHANGE UP
FOLATE SERPL-MCNC: 15.6 NG/ML — SIGNIFICANT CHANGE UP (ref 3.1–17.5)
GLUCOSE SERPL-MCNC: 122 MG/DL — HIGH (ref 70–99)
GLUCOSE UR QL: NEGATIVE MG/DL — SIGNIFICANT CHANGE UP
HCT VFR BLD CALC: 38.1 % — SIGNIFICANT CHANGE UP (ref 34.5–45)
HGB BLD-MCNC: 12.5 G/DL — SIGNIFICANT CHANGE UP (ref 11.5–15.5)
IANC: 4.35 K/UL — SIGNIFICANT CHANGE UP (ref 1.8–7.4)
IMM GRANULOCYTES NFR BLD AUTO: 0.3 % — SIGNIFICANT CHANGE UP (ref 0–0.9)
KETONES UR-MCNC: NEGATIVE MG/DL — SIGNIFICANT CHANGE UP
LEUKOCYTE ESTERASE UR-ACNC: NEGATIVE — SIGNIFICANT CHANGE UP
LYMPHOCYTES # BLD AUTO: 1.73 K/UL — SIGNIFICANT CHANGE UP (ref 1–3.3)
LYMPHOCYTES # BLD AUTO: 25.3 % — SIGNIFICANT CHANGE UP (ref 13–44)
MCHC RBC-ENTMCNC: 28.9 PG — SIGNIFICANT CHANGE UP (ref 27–34)
MCHC RBC-ENTMCNC: 32.8 GM/DL — SIGNIFICANT CHANGE UP (ref 32–36)
MCV RBC AUTO: 88 FL — SIGNIFICANT CHANGE UP (ref 80–100)
MONOCYTES # BLD AUTO: 0.63 K/UL — SIGNIFICANT CHANGE UP (ref 0–0.9)
MONOCYTES NFR BLD AUTO: 9.2 % — SIGNIFICANT CHANGE UP (ref 2–14)
NEUTROPHILS # BLD AUTO: 4.35 K/UL — SIGNIFICANT CHANGE UP (ref 1.8–7.4)
NEUTROPHILS NFR BLD AUTO: 63.6 % — SIGNIFICANT CHANGE UP (ref 43–77)
NITRITE UR-MCNC: NEGATIVE — SIGNIFICANT CHANGE UP
NRBC # BLD: 0 /100 WBCS — SIGNIFICANT CHANGE UP (ref 0–0)
NRBC # FLD: 0 K/UL — SIGNIFICANT CHANGE UP (ref 0–0)
PH UR: 6.5 — SIGNIFICANT CHANGE UP (ref 5–8)
PLATELET # BLD AUTO: 205 K/UL — SIGNIFICANT CHANGE UP (ref 150–400)
POTASSIUM SERPL-MCNC: 4.3 MMOL/L — SIGNIFICANT CHANGE UP (ref 3.5–5.3)
POTASSIUM SERPL-SCNC: 4.3 MMOL/L — SIGNIFICANT CHANGE UP (ref 3.5–5.3)
PROT SERPL-MCNC: 8 G/DL — SIGNIFICANT CHANGE UP (ref 6–8.3)
PROT UR-MCNC: NEGATIVE MG/DL — SIGNIFICANT CHANGE UP
RBC # BLD: 4.33 M/UL — SIGNIFICANT CHANGE UP (ref 3.8–5.2)
RBC # FLD: 12.8 % — SIGNIFICANT CHANGE UP (ref 10.3–14.5)
SODIUM SERPL-SCNC: 138 MMOL/L — SIGNIFICANT CHANGE UP (ref 135–145)
SP GR SPEC: 1.01 — SIGNIFICANT CHANGE UP (ref 1–1.03)
TSH SERPL-MCNC: 4.94 UIU/ML — HIGH (ref 0.27–4.2)
UROBILINOGEN FLD QL: 0.2 MG/DL — SIGNIFICANT CHANGE UP (ref 0.2–1)
VIT B12 SERPL-MCNC: 399 PG/ML — SIGNIFICANT CHANGE UP (ref 200–900)
WBC # BLD: 6.84 K/UL — SIGNIFICANT CHANGE UP (ref 3.8–10.5)
WBC # FLD AUTO: 6.84 K/UL — SIGNIFICANT CHANGE UP (ref 3.8–10.5)

## 2023-07-08 PROCEDURE — 70450 CT HEAD/BRAIN W/O DYE: CPT | Mod: 26,MA

## 2023-07-08 PROCEDURE — 93010 ELECTROCARDIOGRAM REPORT: CPT

## 2023-07-08 PROCEDURE — 99285 EMERGENCY DEPT VISIT HI MDM: CPT

## 2023-07-08 NOTE — ED PROVIDER NOTE - HIV OFFER
Pt called nurse line requesting a letter for work, she stated she was seen yesterday and did not go to work today because she doesn't feel well so she wanted a note excusing her for today  Previously Declined (within the last year)

## 2023-07-08 NOTE — ED PROVIDER NOTE - NSFOLLOWUPINSTRUCTIONS_ED_ALL_ED_FT
Follow up with your Neurologist within the week.  Rest, increase your fluids.   Take all of your other medications as previously prescribed.  Worsening, continued or ANY new concerning symptoms return to the Emergency Department.

## 2023-07-08 NOTE — ED PROVIDER NOTE - OBJECTIVE STATEMENT
62 y/o F with h/o HTN, epilepsy recently admitted in June for seizures with medication adjustments (increase fo Keppra to 1500mg bid, lamictal 25mg bid, Vimpat 150mg bid) pw generalized weakness and numbness to her B/L arms, hands and feet since DC from the hospital significantly exacerbated today. Denies f/c, HA, dizziness, visual changes, CP, SOB, ab pain, dysuria. Had Keppra levels drawn on 7/3 and was told they were elevated at 40 and had her dose of Keppra decreased to 1125 bid x 2 days. Also states blood pressure has been very labile at home. Well appearing, no distress.   Allergy: PCN

## 2023-07-08 NOTE — ED ADULT NURSE NOTE - PRO INTERPRETER NEED 2
Chief complaint  Patient is a 85y old  Male who presents with a chief complaint of Mechanical fall (10 Nov 2022 15:39)   Review of systems  Patient in bed, looks comfortable, no hypoglycemic episodes.    Labs and Fingersticks  CAPILLARY BLOOD GLUCOSE      POCT Blood Glucose.: 154 mg/dL (11 Nov 2022 08:37)  POCT Blood Glucose.: 134 mg/dL (11 Nov 2022 06:08)  POCT Blood Glucose.: 107 mg/dL (10 Nov 2022 23:25)  POCT Blood Glucose.: 121 mg/dL (10 Nov 2022 21:43)  POCT Blood Glucose.: 148 mg/dL (10 Nov 2022 17:11)  POCT Blood Glucose.: 186 mg/dL (10 Nov 2022 13:11)      Anion Gap, Serum: 7 (11-11 @ 07:16)  Anion Gap, Serum: 7 (11-10 @ 06:41)  Anion Gap, Serum: 5 (11-09 @ 13:35)      Calcium, Total Serum: 9.0 (11-11 @ 07:16)  Calcium, Total Serum: 8.9 (11-10 @ 06:41)  Calcium, Total Serum: 9.3 (11-09 @ 13:35)  Albumin, Serum: 2.7 *L* (11-11 @ 07:16)  Albumin, Serum: 2.7 *L* (11-10 @ 06:41)  Albumin, Serum: 2.9 *L* (11-09 @ 13:35)    Alanine Aminotransferase (ALT/SGPT): 16 (11-11 @ 07:16)  Alanine Aminotransferase (ALT/SGPT): 14 (11-10 @ 06:41)  Alanine Aminotransferase (ALT/SGPT): 16 (11-09 @ 13:35)  Alkaline Phosphatase, Serum: 154 *H* (11-11 @ 07:16)  Alkaline Phosphatase, Serum: 162 *H* (11-10 @ 06:41)  Alkaline Phosphatase, Serum: 158 *H* (11-09 @ 13:35)  Aspartate Aminotransferase (AST/SGOT): 21 (11-11 @ 07:16)  Aspartate Aminotransferase (AST/SGOT): 19 (11-10 @ 06:41)  Aspartate Aminotransferase (AST/SGOT): 22 (11-09 @ 13:35)        11-11    139  |  103  |  18  ----------------------------<  138<H>  4.0   |  29  |  0.98    Ca    9.0      11 Nov 2022 07:16  Phos  2.6     11-11  Mg     1.6     11-11    TPro  5.9<L>  /  Alb  2.7<L>  /  TBili  0.4  /  DBili  x   /  AST  21  /  ALT  16  /  AlkPhos  154<H>  11-11                        10.8   3.15  )-----------( 196      ( 11 Nov 2022 07:18 )             34.1     Medications  MEDICATIONS  (STANDING):  chlorhexidine 2% Cloths 1 Application(s) Topical daily  dextrose 5%. 1000 milliLiter(s) (50 mL/Hr) IV Continuous <Continuous>  dextrose 5%. 1000 milliLiter(s) (100 mL/Hr) IV Continuous <Continuous>  dextrose 50% Injectable 25 Gram(s) IV Push once  dextrose 50% Injectable 12.5 Gram(s) IV Push once  dextrose 50% Injectable 25 Gram(s) IV Push once  ertapenem  IVPB      ertapenem  IVPB 1000 milliGRAM(s) IV Intermittent every 24 hours  finasteride 5 milliGRAM(s) Oral daily  glucagon  Injectable 1 milliGRAM(s) IntraMuscular once  heparin   Injectable 5000 Unit(s) SubCutaneous every 8 hours  influenza  Vaccine (HIGH DOSE) 0.7 milliLiter(s) IntraMuscular once  insulin glargine Injectable (LANTUS) 14 Unit(s) SubCutaneous at bedtime  insulin lispro (ADMELOG) corrective regimen sliding scale   SubCutaneous Before meals and at bedtime  insulin lispro Injectable (ADMELOG) 6 Unit(s) SubCutaneous three times a day before meals  lidocaine   4% Patch 1 Patch Transdermal every 24 hours  melatonin 3 milliGRAM(s) Oral at bedtime  metoprolol succinate ER 25 milliGRAM(s) Oral daily  pantoprazole    Tablet 40 milliGRAM(s) Oral before breakfast  polyethylene glycol 3350 17 Gram(s) Oral daily  senna 2 Tablet(s) Oral at bedtime  tamsulosin 0.8 milliGRAM(s) Oral at bedtime      Physical Exam  General: Patient comfortable in bed  Vital Signs Last 12 Hrs  T(F): 97.6 (11-11-22 @ 06:16), Max: 97.6 (11-11-22 @ 06:16)  HR: 70 (11-11-22 @ 06:16) (70 - 70)  BP: 118/72 (11-11-22 @ 06:16) (118/72 - 118/72)  BP(mean): --  RR: 18 (11-11-22 @ 06:16) (18 - 18)  SpO2: 98% (11-11-22 @ 06:16) (98% - 98%)    Diagnostics    Free Thyroxine, Serum: AM Sched. Collection: 21-Oct-2022 06:00 (10-20 @ 13:18)  Thyroid Stimulating Hormone, Serum: AM Sched. Collection: 21-Oct-2022 06:00 (10-20 @ 13:18)  A1C with Estimated Average Glucose: Routine (10-20 @ 13:17)         English   Chief complaint  Patient is a 85y old  Male who presents with a chief complaint of Mechanical fall (10 Nov 2022 15:39)   Review of systems  Patient in bed, looks comfortable, no hypoglycemic episodes.    Labs and Fingersticks  CAPILLARY BLOOD GLUCOSE      POCT Blood Glucose.: 154 mg/dL (11 Nov 2022 08:37)  POCT Blood Glucose.: 134 mg/dL (11 Nov 2022 06:08)  POCT Blood Glucose.: 107 mg/dL (10 Nov 2022 23:25)  POCT Blood Glucose.: 121 mg/dL (10 Nov 2022 21:43)  POCT Blood Glucose.: 148 mg/dL (10 Nov 2022 17:11)  POCT Blood Glucose.: 186 mg/dL (10 Nov 2022 13:11)      Anion Gap, Serum: 7 (11-11 @ 07:16)  Anion Gap, Serum: 7 (11-10 @ 06:41)  Anion Gap, Serum: 5 (11-09 @ 13:35)      Calcium, Total Serum: 9.0 (11-11 @ 07:16)  Calcium, Total Serum: 8.9 (11-10 @ 06:41)  Calcium, Total Serum: 9.3 (11-09 @ 13:35)  Albumin, Serum: 2.7 *L* (11-11 @ 07:16)  Albumin, Serum: 2.7 *L* (11-10 @ 06:41)  Albumin, Serum: 2.9 *L* (11-09 @ 13:35)    Alanine Aminotransferase (ALT/SGPT): 16 (11-11 @ 07:16)  Alanine Aminotransferase (ALT/SGPT): 14 (11-10 @ 06:41)  Alanine Aminotransferase (ALT/SGPT): 16 (11-09 @ 13:35)  Alkaline Phosphatase, Serum: 154 *H* (11-11 @ 07:16)  Alkaline Phosphatase, Serum: 162 *H* (11-10 @ 06:41)  Alkaline Phosphatase, Serum: 158 *H* (11-09 @ 13:35)  Aspartate Aminotransferase (AST/SGOT): 21 (11-11 @ 07:16)  Aspartate Aminotransferase (AST/SGOT): 19 (11-10 @ 06:41)  Aspartate Aminotransferase (AST/SGOT): 22 (11-09 @ 13:35)        11-11    139  |  103  |  18  ----------------------------<  138<H>  4.0   |  29  |  0.98    Ca    9.0      11 Nov 2022 07:16  Phos  2.6     11-11  Mg     1.6     11-11    TPro  5.9<L>  /  Alb  2.7<L>  /  TBili  0.4  /  DBili  x   /  AST  21  /  ALT  16  /  AlkPhos  154<H>  11-11                        10.8   3.15  )-----------( 196      ( 11 Nov 2022 07:18 )             34.1     Medications  MEDICATIONS  (STANDING):  chlorhexidine 2% Cloths 1 Application(s) Topical daily  dextrose 5%. 1000 milliLiter(s) (50 mL/Hr) IV Continuous <Continuous>  dextrose 5%. 1000 milliLiter(s) (100 mL/Hr) IV Continuous <Continuous>  dextrose 50% Injectable 25 Gram(s) IV Push once  dextrose 50% Injectable 12.5 Gram(s) IV Push once  dextrose 50% Injectable 25 Gram(s) IV Push once  ertapenem  IVPB      ertapenem  IVPB 1000 milliGRAM(s) IV Intermittent every 24 hours  finasteride 5 milliGRAM(s) Oral daily  glucagon  Injectable 1 milliGRAM(s) IntraMuscular once  heparin   Injectable 5000 Unit(s) SubCutaneous every 8 hours  influenza  Vaccine (HIGH DOSE) 0.7 milliLiter(s) IntraMuscular once  insulin glargine Injectable (LANTUS) 14 Unit(s) SubCutaneous at bedtime  insulin lispro (ADMELOG) corrective regimen sliding scale   SubCutaneous Before meals and at bedtime  insulin lispro Injectable (ADMELOG) 6 Unit(s) SubCutaneous three times a day before meals  lidocaine   4% Patch 1 Patch Transdermal every 24 hours  melatonin 3 milliGRAM(s) Oral at bedtime  metoprolol succinate ER 25 milliGRAM(s) Oral daily  pantoprazole    Tablet 40 milliGRAM(s) Oral before breakfast  polyethylene glycol 3350 17 Gram(s) Oral daily  senna 2 Tablet(s) Oral at bedtime  tamsulosin 0.8 milliGRAM(s) Oral at bedtime      Physical Exam  General: Patient comfortable in bed  Vital Signs Last 12 Hrs  T(F): 97.6 (11-11-22 @ 06:16), Max: 97.6 (11-11-22 @ 06:16)  HR: 70 (11-11-22 @ 06:16) (70 - 70)  BP: 118/72 (11-11-22 @ 06:16) (118/72 - 118/72)  BP(mean): --  RR: 18 (11-11-22 @ 06:16) (18 - 18)  SpO2: 98% (11-11-22 @ 06:16) (98% - 98%)    Diagnostics    Free Thyroxine, Serum: AM Sched. Collection: 21-Oct-2022 06:00 (10-20 @ 13:18)  Thyroid Stimulating Hormone, Serum: AM Sched. Collection: 21-Oct-2022 06:00 (10-20 @ 13:18)  A1C with Estimated Average Glucose: Routine (10-20 @ 13:17)

## 2023-07-08 NOTE — CONSULT NOTE ADULT - ASSESSMENT
Assessment  63y (1960) woman, PMH HTN on losartan, epilepsy on Keppra/vimpat/lamictal who presents to [facility] ED for generalized weakness with recent discharge on adjusted anti-epileptic medications, recently downtitrated keppra 1125 mg BID in outpatient setting with outpatient neurologist presenting with new onset whole body weakness per patient. CBC within normal limits, fingerstick glucose 203. BMP otherwise within normal limits. Vimpat, keppra levels pending.     Impression: Metabolic abnormalities vs recurrence seizure (less likely on differential given patient is mentating/cognizant/ambulating without issue and without any focal deficits).    Recommendations  -metabolic workup including CBC/CMP, A1c, copper, thiamine, B6, B12, vitamin E, vitamin D, folate, TSH, urine analysis  -f/u vimpat and keppra levels  -continue current antiepileptic regimen and recommend close outpatient f/u with epileptologist  -f/u CT head to rule out any abnormalities  -consider physical and occupational therapy   -if above workup is negative, no further inpatient neurological workup recommended at this time  -remainder of care per primary team    Case discussed with neurology attending, Dr. Maria

## 2023-07-08 NOTE — ED ADULT NURSE NOTE - OBJECTIVE STATEMENT
received pt in room 2, 63 yr/o female A+Ox4, ambulatory at baseline. Past medical history DMII and seizures on kepra. pt presented to the ED C/O generalized weakness since 11AM. denies syncope, LOC, and headstrike. PERRLA noted, no weakness noted in B/L Lower and upper extremities. VSS, denies chest pain and SOB, RR even and unlabored. right AC 20g placed, labs drawn and sent. pt is stable at this time.

## 2023-07-08 NOTE — CONSULT NOTE ADULT - SUBJECTIVE AND OBJECTIVE BOX
Neurology - Consult Note    Spectra: [phone] ([facility]), [phone] ([facility])    HPI:  63y (1960) woman, Sky speaking (daughter at bedside interpeting), PMH HTN on losartan, epilepsy on Keppra/vimpat/lamictal who presents to [facility] ED for generalized ?numbness/weakness.     Of note had recently been discharged from the hospital on 6/20/23. During this hospitalization the patient had been taking advil instead of keppra. In the ED during this previous presentation, had 60 GTC at home with urinary incontinence. Per prior EMR notes: exam normal. EEG reports several electrographic seizures from the posterior parietal head region (left > right) captured as described, Last clear seizure at 08:04 06/15/23. Evidence for left posterior temporal cerebral dysfunction. Medications were adjusted for discharge on vimpat 150 BID, keppra 1500 BID, lamictal 25 BID. Patient recently was seen in the outpatient neurology setting. Two days ago, patient's keppra level was noted to be high, outpatient neurologist Dr. Shepard recommended decreasing keppra to 1125.    Patient's family members at bedside, daughter assisting with interpretation though patient is able to use limited English. Patient reports intermittent right handed weakness after recent discharge. However this morning around 10:55 am, patient reports feeling 'whole body weakness.' Clarified with patient and family at bedside whether this was 'weakness' or 'numbness,' patient denies numbness and reports 'weakness.' Patient's daughter reports blood pressure has been fluctuating more, but patient denies any symptoms with these changes in blood pressures. No changes in vision, no headaches, chest pain, shortness of breath, dizziness, abdominal pain. Of note, patient also describes since last hospital discharge having intermittent 'right handed weakness' which comes and goes, states she has difficulty typing on her phone and sometimes drops objects. Daughter at bedside reports a few days ago testing patient's strength at home and did not notice any deficits at this time.    Family member at bedside reports patient seizures generally present with : hands clenching + trembling/opening mouth/eyes rolling upwards/head tilting to one side + urinary incontinence. Patient does not usually remember events.    Patient presently states feeling better when seen in ED, but still reports LE weakness. Ambulates to restroom without issue. Reports last feeling well around 7 am on day of presentation, was making oatmeal.     Review of Systems:    All other review of systems is negative unless indicated above in HPI.    Allergies:  penicillin (Unknown)    PMHx/PSHx/Family Hx: As above, otherwise see below   Seizure  Hypertension    Social Hx:  Never smoker; no current use of tobacco, alcohol, or illicit drugs  Intact with ADLs at baseline.    Vitals:  T(C): 36.7 (07-08-23 @ 13:54), Max: 36.7 (07-08-23 @ 13:54)  HR: 90 (07-08-23 @ 13:54) (90 - 90)  BP: 149/80 (07-08-23 @ 13:54) (149/80 - 149/80)  RR: 17 (07-08-23 @ 13:54) (17 - 17)  SpO2: 100% (07-08-23 @ 13:54) (100% - 100%)    Physical Examination:   General - non-toxic appearing /female in no acute distress  Cardiovascular - peripheral pulses palpable, no edema  Respiratory - breathing comfortably with no increased work of breathing    Neurologic Exam:  Mental status - Awake, alert, oriented to person, place, and time. Speech fluent, repetition intact though limited by language barrier. Follows simple commands. Attention and concentration intact. Able to describe her own prior hospitalization course.    Cranial nerves - Right pupil reactive to light, L pupil > right pupil, L pupil not responsive to light (noted on prior physical exam 6/18/23 during prior admission), VFF, EOMI, face sensation (V1-V3) intact b/l, facial strength intact without asymmetry b/l, hearing intact b/l, palate with symmetric elevation, sternocleidomastoid 5/5 strength b/l, tongue midline on protrusion with full lateral movement    Motor - Normal bulk and tone throughout. No pronator drift.    Strength testing  *Limited by pain due to right IV in antecubital region            Deltoid            Biceps      Triceps     Wrist Extension    Wrist Flexion     Interossei         R            4+*              4+*            4+*                 5                              5                        5                 5  L             5                 5               5                     5                              5                        5                 5              Hip Flexion    Hip Extension    Knee Flexion    Knee Extension    Dorsiflexion    Plantar Flexion  R              5                         5                       5                           5                            5                          5  L              5                         5                        5                           5                            5                          5    Sensation - Light touch intact throughout    DTR's -             Biceps      Triceps     Brachioradialis      Patellar    Ankle      R             1+             1+                 0+                  1+           1+              L              1+            1+                 0+                   1+           1+                Coordination - Finger to Nose intact b/l. No tremors appreciated  Gait and station - Normal casual gait.    Labs:                        12.5   6.84  )-----------( 205      ( 08 Jul 2023 15:50 )             38.1     07-08    138  |  102  |  20  ----------------------------<  122<H>  4.3   |  25  |  0.99    Ca    9.3      08 Jul 2023 15:50    TPro  8.0  /  Alb  4.6  /  TBili  <0.2  /  DBili  x   /  AST  18  /  ALT  22  /  AlkPhos  119  07-08    CAPILLARY BLOOD GLUCOSE      POCT Blood Glucose.: 203 mg/dL (08 Jul 2023 13:53)    LIVER FUNCTIONS - ( 08 Jul 2023 15:50 )  Alb: 4.6 g/dL / Pro: 8.0 g/dL / ALK PHOS: 119 U/L / ALT: 22 U/L / AST: 18 U/L / GGT: x           Radiology:  MR brain seizure 6/19/23   No hydrocephalus, mass effect, acute intracranial hemorrhage, vasogenic   edema, or acute territorial infarct.    Scattered nonspecific nonenhancing foci of T2 and FLAIR hyperintense   signal in the cerebral white matter.  Statistically, white matter   microvascular ischemic disease is favored. Differential diagnosis   includes demyelinating, infectious, inflammatory, and post   infectious/inflammatory processes.    No abnormal parenchymal or leptomeningeal enhancement.    No evidence for mesial temporal sclerosis, gray matter heterotopia, or   cortical dysplasia.   Neurology - Consult Note    Spectra: [phone] ([facility]), [phone] ([facility])    HPI:  63y (1960) woman, Sky speaking (daughter at bedside interpeting), PMH HTN on losartan, epilepsy on Keppra/vimpat/lamictal who presents to [facility] ED for generalized ?numbness/weakness.     Of note had recently been discharged from the hospital on 6/20/23. During this hospitalization the patient had been taking advil instead of keppra. During this previous presentation, had 60 seconds of GTC at home with urinary incontinence. Per prior EMR notes: EEG reports several electrographic seizures from the posterior parietal head region (left > right) captured as described, Last clear seizure at 08:04 06/15/23. Evidence for left posterior temporal cerebral dysfunction. Medications were adjusted for discharge on vimpat 150 BID, keppra 1500 BID, lamictal 25 BID. Patient recently was seen in the outpatient neurology setting. Two days ago, patient's keppra level was noted to be high, outpatient neurologist Dr. Shepard recommended decreasing keppra to 1125.    Patient's family members at bedside, daughter assisting with interpretation though patient is able to use limited English. Patient reports intermittent right handed weakness after recent discharge. However this morning around 10:55 am, patient reports feeling 'whole body weakness.' Clarified with patient and family at bedside whether this was 'weakness' or 'numbness,' patient denies numbness and reports 'weakness.' Patient's daughter reports blood pressure has been fluctuating more, but patient denies any symptoms with these changes in blood pressures. No changes in vision, no headaches, chest pain, shortness of breath, dizziness, abdominal pain. Of note, patient also describes since last hospital discharge having intermittent 'right handed weakness' which comes and goes, states she has difficulty typing on her phone and sometimes drops objects. Daughter at bedside reports a few days ago testing patient's strength at home and did not notice any deficits at this time.    Family member at bedside reports patient seizures generally present with : hands clenching + trembling/opening mouth/eyes rolling upwards/head tilting to one side + urinary incontinence. Patient does not usually remember events.    Patient presently states feeling better when seen in ED, but still reports LE weakness. Ambulates to restroom without issue. Reports last feeling well around 7 am on day of presentation, was making oatmeal.     Review of Systems:    All other review of systems is negative unless indicated above in HPI.    Allergies:  penicillin (Unknown)    PMHx/PSHx/Family Hx: As above, otherwise see below   Seizure  Hypertension    Social Hx:  Never smoker; no current use of tobacco, alcohol, or illicit drugs  Intact with ADLs at baseline.    Vitals:  T(C): 36.7 (07-08-23 @ 13:54), Max: 36.7 (07-08-23 @ 13:54)  HR: 90 (07-08-23 @ 13:54) (90 - 90)  BP: 149/80 (07-08-23 @ 13:54) (149/80 - 149/80)  RR: 17 (07-08-23 @ 13:54) (17 - 17)  SpO2: 100% (07-08-23 @ 13:54) (100% - 100%)    Physical Examination:   General - non-toxic appearing /female in no acute distress  Cardiovascular - peripheral pulses palpable, no edema  Respiratory - breathing comfortably with no increased work of breathing    Neurologic Exam:  Mental status - Awake, alert, oriented to person, place, and time. Speech fluent, repetition intact though limited by language barrier. Follows simple commands. Attention and concentration intact. Able to describe her own prior hospitalization course.    Cranial nerves - Right pupil reactive to light, L pupil > right pupil, L pupil not responsive to light (noted on prior physical exam 6/18/23 during prior admission), VFF, EOMI, face sensation (V1-V3) intact b/l, facial strength intact without asymmetry b/l, hearing intact b/l, palate with symmetric elevation, sternocleidomastoid 5/5 strength b/l, tongue midline on protrusion with full lateral movement    Motor - Normal bulk and tone throughout. No pronator drift.    Strength testing  *Limited by pain due to right IV in antecubital region            Deltoid            Biceps      Triceps     Wrist Extension    Wrist Flexion     Interossei         R            4+*              4+*            4+*                 5                              5                        5                 5  L             5                 5               5                     5                              5                        5                 5              Hip Flexion    Hip Extension    Knee Flexion    Knee Extension    Dorsiflexion    Plantar Flexion  R              5                         5                       5                           5                            5                          5  L              5                         5                        5                           5                            5                          5    Sensation - Light touch intact throughout    DTR's -             Biceps      Triceps     Brachioradialis      Patellar    Ankle      R             1+             1+                 0+                  1+           1+              L              1+            1+                 0+                   1+           1+                Coordination - Finger to Nose intact b/l. No tremors appreciated  Gait and station - Normal casual gait.    Labs:                        12.5   6.84  )-----------( 205      ( 08 Jul 2023 15:50 )             38.1     07-08    138  |  102  |  20  ----------------------------<  122<H>  4.3   |  25  |  0.99    Ca    9.3      08 Jul 2023 15:50    TPro  8.0  /  Alb  4.6  /  TBili  <0.2  /  DBili  x   /  AST  18  /  ALT  22  /  AlkPhos  119  07-08    CAPILLARY BLOOD GLUCOSE      POCT Blood Glucose.: 203 mg/dL (08 Jul 2023 13:53)    LIVER FUNCTIONS - ( 08 Jul 2023 15:50 )  Alb: 4.6 g/dL / Pro: 8.0 g/dL / ALK PHOS: 119 U/L / ALT: 22 U/L / AST: 18 U/L / GGT: x           Radiology:  MR brain seizure 6/19/23   No hydrocephalus, mass effect, acute intracranial hemorrhage, vasogenic   edema, or acute territorial infarct.    Scattered nonspecific nonenhancing foci of T2 and FLAIR hyperintense   signal in the cerebral white matter.  Statistically, white matter   microvascular ischemic disease is favored. Differential diagnosis   includes demyelinating, infectious, inflammatory, and post   infectious/inflammatory processes.    No abnormal parenchymal or leptomeningeal enhancement.    No evidence for mesial temporal sclerosis, gray matter heterotopia, or   cortical dysplasia.

## 2023-07-08 NOTE — ED PROVIDER NOTE - CLINICAL SUMMARY MEDICAL DECISION MAKING FREE TEXT BOX
64 y/o F with h/o HTN, epilepsy recently admitted in June for seizures with medication adjustments (increase fo Keppra to 1500mg bid, lamictal 25mg bid, Vimpat 150mg bid) pw generalized weakness and numbness to her B/L arms, hands and feet since DC from the hospital significantly exacerbated today. Denies f/c, HA, dizziness, visual changes, CP, SOB, ab pain, dysuria. Had Keppra levels drawn on 7/3 and was told they were elevated at 40 and had her dose of Keppra decreased to 1125 bid x 2 days. Also states blood pressure has been very labile at home. Well appearing, no distress.   Allergy: PCN  PE: No neurological deficits  Plan: Will check basic labs, Vimpat and Keppra levels, CT head, give mild fluids and reassess 64 y/o F with h/o HTN, epilepsy recently admitted in June for seizures with medication adjustments (increase fo Keppra to 1500mg bid, lamictal 25mg bid, Vimpat 150mg bid) pw generalized weakness and numbness to her B/L arms, hands and feet since DC from the hospital significantly exacerbated today. Denies f/c, HA, dizziness, visual changes, CP, SOB, ab pain, dysuria. Had Keppra levels drawn on 7/3 and was told they were elevated at 40 and had her dose of Keppra decreased to 1125 bid x 2 days. Also states blood pressure has been very labile at home. Well appearing, no distress.   Allergy: PCN  PE: No neurological deficits  Plan: Will check basic labs, Vimpat and Keppra levels, CT head, give mild fluids, neuro consult, and reassess  Update: Basic labs stable. Seizure med levels running. Seen by Neuro who also recommended additional BW drawn (see consult). CT head negative. Will DC with Neuro follow up. Strict ED return precautions discussed. Patient understands and agrees.

## 2023-07-08 NOTE — ED ADULT TRIAGE NOTE - CODE STROKE ACTIVE YN
CERTIFICATE OF RETURN TO DAY CARE      This is to certify that Surjit Kline has been under my care from 11/14/2018 and can return to  on 11/15/2018.          RESTRICTIONS:  none        REMARKS:  Has sinus infection not contagious.          Sincerely,          Violetta Arzate M.D.  [facility]  [id number] [place], [place]  [id number] [phone]               No

## 2023-07-08 NOTE — ED ADULT TRIAGE NOTE - CHIEF COMPLAINT QUOTE
Pt coming to ER c/o of generalized weakness and numbness and tingling she has been experiencing for over two weeks. Pt denies chest pain, headache, dizziness. Ambulatory at baseline. Hx HTN and seizure disorder. Finger stick 201

## 2023-07-08 NOTE — ED PROVIDER NOTE - PATIENT PORTAL LINK FT
You can access the FollowMyHealth Patient Portal offered by [facility] by registering at the following website: [email or web address]. By joining [employer]’s FollowMyHealth portal, you will also be able to view your health information using other applications (apps) compatible with our system.

## 2023-07-08 NOTE — ED PROVIDER NOTE - NSICDXFAMILYHX_GEN_ALL_CORE_FT
Occupational/Physical Therapy  Marleny Rojas  PT currently at CT and transferring to ICU. OT/PT to check back as pt is medically appropriate.   Thanks,  Naheed Carter, OTR/L [id number] FAMILY HISTORY:  No pertinent family history in first degree relatives

## 2023-07-09 LAB — VIT D25+D1,25 OH+D1,25 PNL SERPL-MCNC: 49.2 PG/ML — SIGNIFICANT CHANGE UP (ref 19.9–79.3)

## 2023-07-09 NOTE — ED POST DISCHARGE NOTE - REASON FOR FOLLOW-UP
C/o dysuria   TSH 4.94 and Hgb A1C 5.8 discussed with patient's daughter values and to follow up with MD. Discussed with patient need to return to ED if symptoms don't continue to improve or recur or develops any new or worsening symptoms that are of concern. Other

## 2023-07-10 ENCOUNTER — APPOINTMENT (OUTPATIENT)
Dept: NEUROLOGY | Facility: CLINIC | Age: 63
End: 2023-07-10
Payer: MEDICAID

## 2023-07-10 VITALS
SYSTOLIC BLOOD PRESSURE: 161 MMHG | WEIGHT: 122 LBS | HEART RATE: 97 BPM | HEIGHT: 60 IN | BODY MASS INDEX: 23.95 KG/M2 | DIASTOLIC BLOOD PRESSURE: 91 MMHG

## 2023-07-10 PROCEDURE — 99214 OFFICE O/P EST MOD 30 MIN: CPT

## 2023-07-10 NOTE — ASSESSMENT
[FreeTextEntry1] :  63y  woman, Sky speaking, PMH HTN,  epilepsy on Keppra, who presented to [facility] ED for breakthrough seizures likely due to missed meds. She has been well controlled for many years on Keppra. EEG reported Rare left posterior temporal P7 max sharp waves. now on Vimpat 150mg BID and Keppra 1500mg BID, Lamictal 25mg BID  since hospitalization no recurrent events.   \par Pill box\par BW to check level. consider decrease of LEV\par f/u Dr. Shepard\par \par \par We have discussed the risk of SUDEP and injury to brain from repeated uncontrolled seizures.   \par Seizure threshold has been discussed including effects of lack of sleep, low blood sugar and effects of alcohol on seizures.  patient was advised in regards to risks and driving privileges associated with the [place] Guidelines.  The patient was advised in regards to the risk of seizures and general seizure safety recommendations including not to be bathing alone, climbing to high places and operating heavy machinery. The importance of compliance with medications was reinforced.  \par

## 2023-07-10 NOTE — HISTORY OF PRESENT ILLNESS
[FreeTextEntry1] :  63 year old f with h/o of epilepsy  since age 18. \par \par She reports since age 18 she had  first seizure was given medications for it for a year then stopped  she reports they occurred once an year which she  only took meds prn for. \par \par  to   she  was seizure free off  medications. \par  [place]    was [place] to hospital. was sleep deprived and stressed. was watching TV and felt her foot and hands were numb- dtr reports all limbs stiff, foaming with head deviating to right.   came to when she was in the EMS. was started on Keppra 500mg BID  which she maintained on until \par \par - forgot meds for two days, had GTC last 30 seconds, +urinary incontinence and tongue bite and came to quicker than \par \par 2021-  passed away, stressed \par 2021- had covid was under a lot of stress, grieving. lowered Keppra as she thought she was having side effects, weakness,numbness. \par 2022- keppra reduced to 250mg am and 500mg pM\par was doing well  with no breakthrough events until 2023 when  she  mixed up  her medications with Advil. missed full doses\par \par  \par  Daughter- Evon reports found her in bed confused,. taken to [facility] admitted for 10 days. EEG -Rare left posterior temporal P7 max sharp waves   restarted on keppra  1500mg BID, VImpat 150mg BID, Lamotrigine 25mg BID. since discharged, she  reports she has not had any seizures.  has been feeling more sleepy sometimes dizziness. \par  \par yesterday felt numbness in right foot and  right hand. lasted 8am -3pm \par \par Mood can fluctuate; feel more depressed when her daughter is not around. 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 \par \par \par \par \par \par \par EEhr EEG:\par EEG Classification / Summary:\par Abnormal EEG study, awake / drowsy / asleep\par -Rare left posterior temporal P7 max sharp waves\par --------------------------------------------------------------------------------\par Clinical Impression:\par Risk of seizures from the left posterior temporal region, none recorded\par \par Hospital Course:\par Discharge Date	2023\par Admission Date	2023 05:22\par Reason for Admission	seizures\par Hospital Course	\par 63y  woman, Sky speaking, PMH HTN on losartan, epilepsy on Keppra, who\par presented to [facility] ED for seizures, neurology consulted. EEG w/ seizures. AEDs\par adjusted per neuro recs. MR brain obtained sig for white matter microvascular\par ischemic disease. Seizures now well controlled. Per family, pt likely had been\par taking advil at home instead of keppra. Discussed w. neurology, recs dc on\par Vimpat 150 BID, Keppra 1500 mg BID and Lamictal 25 mg bid. Slowly titrate\par lamotrigine up as outpatient - increase to 50 mg BID after 2 weeks on 25 BID.\par Discussed w/ family. \par  \par  \par  \par Diagnosis: Seizure\par Vimpat 150 twice a day, Keppra 1500 mg\par twice a day and Lamictal 25 mg twice a day. Your lamictal can be increased to\par 50mg after 2 weeks (already received 1 week inpatient therefore the higher dose\par 50mg twice a day should be started on .

## 2023-07-10 NOTE — PHYSICAL EXAM
[Person] : oriented to person [Place] : oriented to place [Time] : oriented to time [Concentration Intact] : normal concentrating ability [Naming Objects] : no difficulty naming common objects [Repeating Phrases] : no difficulty repeating a phrase [Fluency] : fluency intact [Comprehension] : comprehension intact [Past History] : adequate knowledge of personal past history [Cranial Nerves Optic (II)] : visual acuity intact bilaterally,  visual fields full to confrontation, pupils equal round and reactive to light [Cranial Nerves Oculomotor (III)] : extraocular motion intact [Cranial Nerves Trigeminal (V)] : facial sensation intact symmetrically [Cranial Nerves Facial (VII)] : face symmetrical [Cranial Nerves Vestibulocochlear (VIII)] : hearing was intact bilaterally [Cranial Nerves Glossopharyngeal (IX)] : tongue and palate midline [Cranial Nerves Accessory (XI - Cranial And Spinal)] : head turning and shoulder shrug symmetric [Cranial Nerves Hypoglossal (XII)] : there was no tongue deviation with protrusion [Motor Tone] : muscle tone was normal in all four extremities [Motor Strength] : muscle strength was normal in all four extremities [No Muscle Atrophy] : normal bulk in all four extremities [Sensation Tactile Decrease] : light touch was intact [Abnormal Walk] : normal gait [Balance] : balance was intact [2+] : Ankle jerk left 2+ [General Appearance - In No Acute Distress] : in no acute distress [Mood] : the mood was normal [Short Term Intact] : short term memory intact [Reading] : reading intact [Current Events] : adequate knowledge of current events [Past-pointing] : there was no past-pointing [Tremor] : no tremor present [Plantar Reflex Right Only] : normal on the right [Plantar Reflex Left Only] : normal on the left

## 2023-07-11 LAB
LACOSAMIDE (VIMPAT) RESULT: 12.81 UG/ML — HIGH (ref 1–10)
LACOSAMIDE (VIMPAT): 11.15 UG/ML
LEVETIRACETAM SERPL-MCNC: 52.8 UG/ML — HIGH (ref 10–40)

## 2023-07-12 ENCOUNTER — NON-APPOINTMENT (OUTPATIENT)
Age: 63
End: 2023-07-12

## 2023-07-12 LAB
PYRIDOXAL PHOS SERPL-MCNC: 10.7 UG/L — SIGNIFICANT CHANGE UP (ref 3.4–65.2)
VIT B1 SERPL-MCNC: 111 NMOL/L — SIGNIFICANT CHANGE UP (ref 66.5–200)

## 2023-07-13 LAB — COPPER SERPL-MCNC: 134 UG/DL — SIGNIFICANT CHANGE UP (ref 80–158)

## 2023-07-14 ENCOUNTER — APPOINTMENT (OUTPATIENT)
Dept: NEUROLOGY | Facility: CLINIC | Age: 63
End: 2023-07-14
Payer: MEDICAID

## 2023-07-14 PROCEDURE — 99442: CPT

## 2023-07-14 NOTE — ASSESSMENT
[FreeTextEntry1] :  63y  woman, Sky speaking, PMH HTN,  epilepsy on Keppra, who presented to [facility] ED for breakthrough seizures likely due to missed meds. She has been well controlled for many years on Keppra. EEG reported Rare left posterior temporal P7 max sharp waves. now on Vimpat 150mg BID  with elevated trough level. decrease 100mg BID and Keppra 1000 BID, Lamictal 25mg BID   no recurrent events.   \par Pill box\par  \par f/u Dr. Shepard\par \par \par We have discussed the risk of SUDEP and injury to brain from repeated uncontrolled seizures.   \par Seizure threshold has been discussed including effects of lack of sleep, low blood sugar and effects of alcohol on seizures.  patient was advised in regards to risks and driving privileges associated with the [place] Guidelines.  The patient was advised in regards to the risk of seizures and general seizure safety recommendations including not to be bathing alone, climbing to high places and operating heavy machinery. The importance of compliance with medications was reinforced.  \par

## 2023-07-14 NOTE — HISTORY OF PRESENT ILLNESS
[FreeTextEntry1] : **Interval 7.10.23: since seen, she has not had any seizures. LEV was decreased 750mg 1.5 tab BID felt less side effects.  on 23 she called daughter in afternoon with feeling weak  al over that morning. taken to ED, hct completed\par  notes she can feel week after medications  mainly in the morning.  no  effects with evening- also does not take BP medications.   BPs have been fluctuating \par notes this morning took meds at 6am and went back to sleep, felt fine.  \par \par Pt daughter reports she stresses when she is alone at home. \par \par Initial \par 63 year old f with h/o of epilepsy  since age 18. \par She reports since age 18 she had  first seizure was given medications for it for a year then stopped 1985 she reports they occurred once an year which she  only took meds prn for. \par \par  to   she  was seizure free off  medications. \par  [place]    was admitted to hospital. was sleep deprived and stressed. was watching TV and felt her foot and hands were numb- dtr reports all limbs stiff, foaming with head deviating to right.   came to when she was in the EMS. was started on Keppra 500mg BID  which she maintained on until \par \par - forgot meds for two days, had GTC last 30 seconds, +urinary incontinence and tongue bite and came to quicker than \par \par 2021-  passed away, stressed \par 2021- had covid was under a lot of stress, grieving. lowered Keppra as she thought she was having side effects, weakness,numbness. \par 2022- keppra reduced to 250mg am and 500mg pM\par was doing well  with no breakthrough events until 2023 when  she  mixed up  her medications with Advil. missed full doses\par \par  \par  Daughter- Evon reports found her in bed confused,. taken to [facility] admitted for 10 days. EEG -Rare left posterior temporal P7 max sharp waves   restarted on keppra  1500mg BID, VImpat 150mg BID, Lamotrigine 25mg BID. since discharged, she  reports she has not had any seizures.  has been feeling more sleepy sometimes dizziness. \par  \par yesterday felt numbness in right foot and  right hand. lasted 8am -3pm \par \par Mood can fluctuate; feel more depressed when her daughter is not around. 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 \par \par \par \par \par \par \par EEhr EEG:\par EEG Classification / Summary:\par Abnormal EEG study, awake / drowsy / asleep\par -Rare left posterior temporal P7 max sharp waves\par --------------------------------------------------------------------------------\par Clinical Impression:\par Risk of seizures from the left posterior temporal region, none recorded\par \par Hospital Course:\par Discharge Date	2023\par Admission Date	2023 05:22\par Reason for Admission	seizures\par Hospital Course	\par 63y  woman, Sky speaking, PMH HTN on losartan, epilepsy on Keppra, who\par presented to [facility] ED for seizures, neurology consulted. EEG w/ seizures. AEDs\par adjusted per neuro recs. MR brain obtained sig for white matter microvascular\par ischemic disease. Seizures now well controlled. Per family, pt likely had been\par taking advil at home instead of keppra. Discussed w. neurology, recs dc on\par Vimpat 150 BID, Keppra 1500 mg BID and Lamictal 25 mg bid. Slowly titrate\par lamotrigine up as outpatient - increase to 50 mg BID after 2 weeks on 25 BID.\par Discussed w/ family. \par  \par  \par  \par Diagnosis: Seizure\par Vimpat 150 twice a day, Keppra 1500 mg\par twice a day and Lamictal 25 mg twice a day. Your lamictal can be increased to\par 50mg after 2 weeks (already received 1 week inpatient therefore the higher dose\par 50mg twice a day should be started on .

## 2023-07-14 NOTE — REASON FOR VISIT
[Home] : at home, [unfilled] , at the time of the visit. [Medical Office: ([facility])___] : at the medical office located in  [Family Member] : family member

## 2023-07-16 NOTE — HISTORY OF PRESENT ILLNESS
[FreeTextEntry1] : **interval 23: has been taking LAC 100mg BID. LEV 1000mg-1000mg, lamotrigine 25mgbid.  has not had any events. feel weakness after taking medication. has been waking up at 6am to take meds then naps, which helps,if not she can feel sleepy. \par saw PCP, no changes in BP medications.\par BW from ED LAC 12, LEV 52\par Has found a therapist to start next week.\par \par **Interval 7.10.23: since seen, she has not had any seizures. LEV was decreased 750mg 1.5 tab BID felt less side effects.  on 23 she called daughter in afternoon with feeling weak  al over that morning. taken to ED, hct completed\par  notes she can feel week after medications  mainly in the morning.  no  effects with evening- also does not take BP medications.   BPs have been fluctuating \par notes this morning took meds at 6am and went back to sleep, felt fine.  \par \par Pt daughter reports she stresses when she is alone at home. \par \par Initial \par 63 year old f with h/o of epilepsy  since age 18. \par She reports since age 18 she had  first seizure was given medications for it for a year then stopped  she reports they occurred once an year which she  only took meds prn for. \par \par  to   she  was seizure free off  medications. \par  [place]    was [place] to hospital. was sleep deprived and stressed. was watching TV and felt her foot and hands were numb- dtr reports all limbs stiff, foaming with head deviating to right.   came to when she was in the EMS. was started on Keppra 500mg BID  which she maintained on until \par \par - forgot meds for two days, had GTC last 30 seconds, +urinary incontinence and tongue bite and came to quicker than \par \par 2021-  passed away, stressed \par 2021- had covid was under a lot of stress, grieving. lowered Keppra as she thought she was having side effects, weakness,numbness. \par 2022- keppra reduced to 250mg am and 500mg pM\par was doing well  with no breakthrough events until 2023 when  she  mixed up  her medications with Advil. missed full doses\par \par  \par  Daughter- Evon reports found her in bed confused,. taken to [facility] admitted for 10 days. EEG -Rare left posterior temporal P7 max sharp waves   restarted on keppra  1500mg BID, VImpat 150mg BID, Lamotrigine 25mg BID. since discharged, she  reports she has not had any seizures.  has been feeling more sleepy sometimes dizziness. \par  \par yesterday felt numbness in right foot and  right hand. lasted 8am -3pm \par \par Mood can fluctuate; feel more depressed when her daughter is not around. 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 \par \par \par \par \par \par \par EEhr EEG:\par EEG Classification / Summary:\par Abnormal EEG study, awake / drowsy / asleep\par -Rare left posterior temporal P7 max sharp waves\par --------------------------------------------------------------------------------\par Clinical Impression:\par Risk of seizures from the left posterior temporal region, none recorded\par \par Hospital Course:\par Discharge Date	2023\par Admission Date	2023 05:22\par Reason for Admission	seizures\par Hospital Course	\par 63y  woman, Sky speaking, PMH HTN on losartan, epilepsy on Keppra, who\par presented to [facility] ED for seizures, neurology consulted. EEG w/ seizures. AEDs\par adjusted per neuro recs. MR brain obtained sig for white matter microvascular\par ischemic disease. Seizures now well controlled. Per family, pt likely had been\par taking advil at home instead of keppra. Discussed w. neurology, recs dc on\par Vimpat 150 BID, Keppra 1500 mg BID and Lamictal 25 mg bid. Slowly titrate\par lamotrigine up as outpatient - increase to 50 mg BID after 2 weeks on 25 BID.\par Discussed w/ family. \par  \par  \par  \par Diagnosis: Seizure\par Vimpat 150 twice a day, Keppra 1500 mg\par twice a day and Lamictal 25 mg twice a day. Your lamictal can be increased to\par 50mg after 2 weeks (already received 1 week inpatient therefore the higher dose\par 50mg twice a day should be started on .

## 2023-07-16 NOTE — ASSESSMENT
[FreeTextEntry1] :  63y  woman, Sky speaking, PMH HTN,  epilepsy on Keppra, who presented to [facility] ED for breakthrough seizures likely due to missed meds. She has been well controlled for many years on Keppra. EEG reported Rare left posterior temporal P7 max sharp waves. now on Vimpat 100mg BID  Keppra 1000 BID, Lamictal 25mg BID   no recurrent events. start talk therapy \par Pill box\par  \par f/u Dr. Shepard\par \par \par We have discussed the risk of SUDEP and injury to brain from repeated uncontrolled seizures.   \par Seizure threshold has been discussed including effects of lack of sleep, low blood sugar and effects of alcohol on seizures.  patient was advised in regards to risks and driving privileges associated with the [place] Guidelines.  The patient was advised in regards to the risk of seizures and general seizure safety recommendations including not to be bathing alone, climbing to high places and operating heavy machinery. The importance of compliance with medications was reinforced.  \par

## 2023-07-25 ENCOUNTER — APPOINTMENT (OUTPATIENT)
Dept: NEUROLOGY | Facility: CLINIC | Age: 63
End: 2023-07-25
Payer: MEDICAID

## 2023-07-25 PROCEDURE — 95816 EEG AWAKE AND DROWSY: CPT

## 2023-07-26 PROCEDURE — 95708 EEG WO VID EA 12-26HR UNMNTR: CPT

## 2023-07-26 PROCEDURE — 95719 EEG PHYS/QHP EA INCR W/O VID: CPT

## 2023-07-26 PROCEDURE — 95717 EEG PHYS/QHP 2-12 HR W/O VID: CPT

## 2023-07-26 PROCEDURE — 95700 EEG CONT REC W/VID EEG TECH: CPT

## 2023-07-26 PROCEDURE — 95705 EEG W/O VID 2-12 HR UNMNTR: CPT

## 2023-08-01 ENCOUNTER — APPOINTMENT (OUTPATIENT)
Dept: NEUROLOGY | Facility: CLINIC | Age: 63
End: 2023-08-01
Payer: MEDICAID

## 2023-08-01 VITALS
DIASTOLIC BLOOD PRESSURE: 87 MMHG | SYSTOLIC BLOOD PRESSURE: 151 MMHG | BODY MASS INDEX: 22.84 KG/M2 | HEART RATE: 92 BPM | HEIGHT: 61 IN | WEIGHT: 121 LBS

## 2023-08-01 PROCEDURE — 99215 OFFICE O/P EST HI 40 MIN: CPT

## 2023-08-01 RX ORDER — LEVETIRACETAM 750 MG/1
750 TABLET, FILM COATED ORAL
Refills: 0 | Status: DISCONTINUED | COMMUNITY
End: 2023-08-01

## 2023-08-01 RX ORDER — LOSARTAN POTASSIUM 100 MG/1
100 TABLET, FILM COATED ORAL
Refills: 0 | Status: DISCONTINUED | COMMUNITY
End: 2023-08-01

## 2023-08-01 RX ORDER — LACOSAMIDE 150 MG/1
150 TABLET ORAL
Refills: 0 | Status: DISCONTINUED | COMMUNITY
End: 2023-08-01

## 2023-08-01 NOTE — HISTORY OF PRESENT ILLNESS
[FreeTextEntry1] : Rx: LEV 1500mg bid (LEV 70)-> 1000mg bid; LTG 25mg bid (LTG 2.3) , LAC 150mg bid (11) ->100mg bid  63F with seizures since age 18, q yearly around that time baseline. On some meds intermittently per parents in [place]  to  she was seizure free off medications. Seizures improved from age 38->age 51  2011 Had witnessed FTBTCS at that time Convulsion, eye rolling witnessed per lilo. Had repeat sz 4yrs later .   forgot meds for two days, had GTC last 30 seconds, +urinary incontinence and tongue bite and came to quicker than  2023 taken to [facility] admitted for 10 days. felt could not move arms/legs "felt like I am dying"  lilo came, broke door down, AMS. in ER sz observed in waiting area x 1min pt states she was taking ibuprofen instead of LEV for some time prior x 2-3weeks? restarted on LEV, but 11 breakthrough szs noted day 1 5 sz next day LEV increased and LAC added to 150mg bid.  LTG added.  Does not report seizures since leaving. P DC, LEV reduced, LAC reduced, LTG 50mg bid.  EE2023  Day 1: Left posterior temporal slowing Rare left posterior temporal P7 max sharp wave interictal epileptiform discharges were present. 11 electrographic seizures captured. Seizures are poorly lateralized, but appear to originate from the posterior head region. Seizures characterized by initial sharply contoured delta activity from the posterior head region (O1/P7 > O2/P8) that evolves to rhythmic theta activity. For some of the seizures, particularly the ones earlier in the study, there was rapid diffuse generalization of the rhythmic theta activity. Seizure duration was 30-60 seconds. Day 2:  7 electrographic seizures captured. Seizures appear to originate from bilateral (left > right) parietal regions. Seizures characterized by initial sharply contoured delta activity from the posterior parietal head region (P7/P3 > P8/P4) that evolves to rhythmic theta activity, with rapid generalization. A few of the seizures had intermittent cessation (<10 second) before continuation of theta activity. Seizure duration was 3-5mins. Day 3:  2 electrographic seizures captured. Seizures appear to originate from bilateral (left > right) parietal regions. Seizures characterized by initial sharply contoured delta activity from the posterior parietal head region (P7/P3 > P8/P4) that evolves to rhythmic theta activity, with rapid generalization.    ROS: P lost  2021, and p COVID, felt tired/fatigue, numbness in hands/legs, in 2022, LEV lowered at that time. Anxiety an issue. Going to psychotherapy Unsteadiness p meds

## 2023-08-01 NOTE — PHYSICAL EXAM
[FreeTextEntry1] : General Constitutional: alert and in no acute distress.  Psychiatric: affect normal, insight and judgment intact. Neurologic:  Orientation: oriented to person, place, and time  Attention: normal concentrating ability and attention was not decreased.  Language: no difficulty naming common objects, repeating a phrase, writing a sentence; fluency, comprehension, and reading intact.  Fund of knowledge: displays adequate knowledge of personal past history.  Cranial Nerves: visual acuity intact bilaterally, visual fields full to confrontation, pupils equal round and reactive to light, extraocular motion intact, facial sensation intact symmetrically, face symmetrical, hearing was intact bilaterally, tongue and palate midline, head turning and shoulder shrug symmetric and there was no tongue deviation with protrusion.  Motor: muscle tone was normal in all four extremities, muscle strength was normal in all four extremities and normal bulk in all four extremities.  Coordination:. normal gait. balance was intact. there was no past-pointing. no tremor present.  Deep tendon reflexes:  Biceps right 2+. Biceps left 2+.   Triceps right 2+. Triceps left 2+.   Brachioradialis right 2+. Brachioradialis left 2+.   Patella right 2+. Patella left 2+.   Ankle jerk right 1+. Ankle jerk left 1+.  Eyes: the sclera and conjunctiva were normal.  Neck: the appearance of the neck was normal.  Musculoskeletal: no clubbing or cyanosis of the fingernails.  Skin: no lesions, rash

## 2023-08-01 NOTE — DISCUSSION/SUMMARY
[Simple Partial] : simple partial [Complex Partial] : complex partial [Secondary Generalization] : secondary generalization [Idiopathic] : idiopathic  [Focal] : focal [Risks Associated with Driving/[place] Law] : As per my usual protocol, the patient was advised in regards to risks and driving privileges associated with the [place] Guidelines.  [Safety Recommendations] : The patient was advised in regards to the risk of seizures and general seizure safety recommendations including not to be bathing alone, climbing to high places and operating heavy machinery. [Compliance with Medications] : The importance of compliance with medications was reinforced. [Medication Side Effects] : High frequency and serious potential medication adverse effects were reviewed with the patient, including but not exclusive to psychiatric effects.  Information sheets on medication side effects were made available to the patient in our clinic.  The patient or advocate agrees to notify us for any concerns. [Risk of Death] : Risk of death associated with seizures / SUDEP was discussed. [FreeTextEntry1] : Ms. 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  - -Other issues: -Anxiety -Mood issues in 2022 p loss of  -HTN  -EEG report pending  Plan: Current recommendations are to proceed with: - -AEEG for szs control done past week,  will discuss p results -Consider titration LTG, taper of LEV. -Schedule written/given -Labs p titration, then consider further taper of LEV (mood) or LAC (dizziness) thereafter.  Target dual or monotherapy. -fuv NP Kieran 2-3mo, and with me in 5-6mo  Education provided regarding clinical diagnosis and plan.  Patient was given the opportunity to ask further questions in regards to their care.  x55min

## 2023-08-24 NOTE — ED PROVIDER NOTE - OTHER FINDINGS
[facility] Discharge Summary     Patient ID:  Juanita Lopez  [id number]  28 y.o.  1940    Admit date: 8/23/2023    Discharge date: 8/24/2023     Admitting Physician: Danae Little MD     Referring Cardiologist:  Dr. Lori Cunningham    PCP:  Gerry Khoury MD    Admitting Diagnoses: Mitral Regurgitation     Discharge Diagnoses: MR s/p SAMUEL    1. Nonrheumatic mitral valve regurgitation    2. Mitral valve insufficiency, unspecified etiology    3. Severe mitral regurgitation        Discharged Condition: Stable    Disposition: home, see patient instructions for treatment and plan    Procedures for this admission:  Procedure(s):  TRANSCATHETER EGDE TO EDGE MITRAL VALVE REPAIR    Discharge Medications:      Medication List        CONTINUE taking these medications      acetaminophen 325 MG tablet  Commonly known as: TYLENOL     ascorbic acid 250 MG tablet  Commonly known as: VITAMIN C     aspirin 81 MG EC tablet     doxycycline hyclate 100 MG tablet  Commonly known as: VIBRA-TABS     dutasteride 0.5 MG capsule  Commonly known as: AVODART     losartan 50 MG tablet  Commonly known as: COZAAR     tamsulosin 0.4 MG capsule  Commonly known as: FLOMAX     therapeutic multivitamin-minerals tablet     Vitron-C  MG Tabs  Generic drug: iron-vitamin C     zinc sulfate 220 (50 Zn) MG capsule  Commonly known as: ZINCATE            STOP taking these medications      ibuprofen 200 MG tablet  Commonly known as: ADVIL;MOTRIN            HPI: Copied from H&P dated 7/27/23    81 y.o. male with PMHx of Mitral Regurgitation, HTN, HLD, BPH, Right hip replacement 1 week ago, that is referred to the [facility] by Dr. Lori Cunningham for interventional evaluation of  Mitral Regurgitation. The patient has noticed that his symptoms have been getting worse slowly over the last year. He engages in routine exercise and rides his bike about 3-4 miles a couple of times per week.  He could walk about 1/4 mile before he would have to stop and qtc [phone]

## 2023-09-15 NOTE — ED PROVIDER NOTE - ATTENDING WITH...
- Recently started on Zoloft 50 mg and taking as prescribed. Denies any side effects. She does notice positive improvement. However, she feels like could be slightly better. No SI or HI.  -Currently 29 weeks 2 days pregnant. Following with OB. .  -Discussed increasing dose rather than adding new medications for continued improvement. Patient agrees and notes she discussed with OB who was in agreement as well. Plan:  -Increase Zoloft 100 mg p.o. daily  -Continue behavioral therapy  -Discussed meditation, mindfulness, box breathing and situations of anxiety.  -Follow-up in 6 to 8 weeks or sooner with any changes in signs and symptoms. ACP

## 2023-09-18 RX ORDER — LAMOTRIGINE 25 MG/1
25 TABLET ORAL
Refills: 0 | Status: DISCONTINUED | COMMUNITY
End: 2023-09-18

## 2023-09-25 ENCOUNTER — APPOINTMENT (OUTPATIENT)
Dept: NEUROLOGY | Facility: CLINIC | Age: 63
End: 2023-09-25

## 2023-09-25 ENCOUNTER — NON-APPOINTMENT (OUTPATIENT)
Age: 63
End: 2023-09-25

## 2023-09-26 ENCOUNTER — NON-APPOINTMENT (OUTPATIENT)
Age: 63
End: 2023-09-26

## 2023-09-26 LAB
ALBUMIN SERPL ELPH-MCNC: 4.6 G/DL
ALP BLD-CCNC: 132 U/L
ALT SERPL-CCNC: 20 U/L
ANION GAP SERPL CALC-SCNC: 12 MMOL/L
AST SERPL-CCNC: 17 U/L
BILIRUB DIRECT SERPL-MCNC: 0.1 MG/DL
BILIRUB INDIRECT SERPL-MCNC: 0.1 MG/DL
BILIRUB SERPL-MCNC: 0.2 MG/DL
BUN SERPL-MCNC: 16 MG/DL
CALCIUM SERPL-MCNC: 9.6 MG/DL
CHLORIDE SERPL-SCNC: 106 MMOL/L
CO2 SERPL-SCNC: 26 MMOL/L
CREAT SERPL-MCNC: 1.01 MG/DL
EGFR: 63 ML/MIN/1.73M2
GLUCOSE SERPL-MCNC: 108 MG/DL
HCT VFR BLD CALC: 39.6 %
HGB BLD-MCNC: 12.3 G/DL
MCHC RBC-ENTMCNC: 29.4 PG
MCHC RBC-ENTMCNC: 31.1 GM/DL
MCV RBC AUTO: 94.7 FL
PLATELET # BLD AUTO: 244 K/UL
POTASSIUM SERPL-SCNC: 5.2 MMOL/L
PROT SERPL-MCNC: 7.5 G/DL
RBC # BLD: 4.18 M/UL
RBC # FLD: 13.2 %
SODIUM SERPL-SCNC: 144 MMOL/L
WBC # FLD AUTO: 7.42 K/UL

## 2023-09-28 ENCOUNTER — TRANSCRIPTION ENCOUNTER (OUTPATIENT)
Age: 63
End: 2023-09-28

## 2023-09-28 LAB
LAMOTRIGINE SERPL-MCNC: 7.5 UG/ML
LEVETIRACETAM SERPL-MCNC: 26.4 UG/ML

## 2023-09-29 LAB — LACOSAMIDE (VIMPAT): 4.11 UG/ML

## 2023-09-29 RX ORDER — LACOSAMIDE 100 MG/1
100 TABLET ORAL
Qty: 60 | Refills: 3 | Status: DISCONTINUED | COMMUNITY
Start: 2023-07-10 | End: 2023-09-29

## 2023-11-03 ENCOUNTER — NON-APPOINTMENT (OUTPATIENT)
Age: 63
End: 2023-11-03

## 2023-11-03 ENCOUNTER — APPOINTMENT (OUTPATIENT)
Dept: OPHTHALMOLOGY | Facility: CLINIC | Age: 63
End: 2023-11-03
Payer: MEDICAID

## 2023-11-03 PROCEDURE — 92004 COMPRE OPH EXAM NEW PT 1/>: CPT

## 2023-11-07 ENCOUNTER — TRANSCRIPTION ENCOUNTER (OUTPATIENT)
Age: 63
End: 2023-11-07

## 2023-11-09 RX ORDER — LOSARTAN POTASSIUM 50 MG/1
50 TABLET, FILM COATED ORAL DAILY
Qty: 30 | Refills: 0 | Status: ACTIVE | COMMUNITY
Start: 2023-07-07

## 2023-11-09 RX ORDER — LEVETIRACETAM 500 MG/1
500 TABLET, FILM COATED ORAL TWICE DAILY
Qty: 180 | Refills: 1 | Status: DISCONTINUED | COMMUNITY
Start: 2023-07-10 | End: 2023-11-09

## 2023-11-22 ENCOUNTER — APPOINTMENT (OUTPATIENT)
Dept: CARDIOLOGY | Facility: CLINIC | Age: 63
End: 2023-11-22

## 2023-11-28 ENCOUNTER — APPOINTMENT (OUTPATIENT)
Dept: NEUROLOGY | Facility: CLINIC | Age: 63
End: 2023-11-28
Payer: MEDICAID

## 2023-11-28 DIAGNOSIS — R53.82 CHRONIC FATIGUE, UNSPECIFIED: ICD-10-CM

## 2023-11-28 DIAGNOSIS — Z51.81 ENCOUNTER FOR THERAPEUTIC DRUG LVL MONITORING: ICD-10-CM

## 2023-11-28 PROCEDURE — 99214 OFFICE O/P EST MOD 30 MIN: CPT

## 2023-11-30 PROBLEM — I10 HYPERTENSION: Status: ACTIVE | Noted: 2023-11-09

## 2023-11-30 PROBLEM — F41.9 ANXIETY AND DEPRESSION: Status: ACTIVE | Noted: 2023-11-28

## 2023-11-30 PROBLEM — G40.909 EPILEPSY: Status: ACTIVE | Noted: 2023-06-27

## 2023-12-03 ENCOUNTER — APPOINTMENT (OUTPATIENT)
Dept: MRI IMAGING | Facility: CLINIC | Age: 63
End: 2023-12-03

## 2023-12-04 ENCOUNTER — APPOINTMENT (OUTPATIENT)
Dept: CARDIOLOGY | Facility: CLINIC | Age: 63
End: 2023-12-04

## 2023-12-04 DIAGNOSIS — F41.9 ANXIETY DISORDER, UNSPECIFIED: ICD-10-CM

## 2023-12-04 DIAGNOSIS — I10 ESSENTIAL (PRIMARY) HYPERTENSION: ICD-10-CM

## 2023-12-04 DIAGNOSIS — F32.A ANXIETY DISORDER, UNSPECIFIED: ICD-10-CM

## 2023-12-04 DIAGNOSIS — G40.909 EPILEPSY, UNSPECIFIED, NOT INTRACTABLE, W/OUT STATUS EPILEPTICUS: ICD-10-CM

## 2023-12-05 ENCOUNTER — TRANSCRIPTION ENCOUNTER (OUTPATIENT)
Age: 63
End: 2023-12-05

## 2023-12-05 LAB
LAMOTRIGINE SERPL-MCNC: 9.7 UG/ML
LEVETIRACETAM SERPL-MCNC: 30 UG/ML

## 2023-12-07 ENCOUNTER — APPOINTMENT (OUTPATIENT)
Dept: MRI IMAGING | Facility: IMAGING CENTER | Age: 63
End: 2023-12-07

## 2023-12-11 ENCOUNTER — APPOINTMENT (OUTPATIENT)
Dept: MRI IMAGING | Facility: CLINIC | Age: 63
End: 2023-12-11
Payer: MEDICAID

## 2023-12-11 PROCEDURE — 70551 MRI BRAIN STEM W/O DYE: CPT

## 2023-12-12 ENCOUNTER — TRANSCRIPTION ENCOUNTER (OUTPATIENT)
Age: 63
End: 2023-12-12

## 2023-12-13 ENCOUNTER — APPOINTMENT (OUTPATIENT)
Dept: NEUROLOGY | Facility: CLINIC | Age: 63
End: 2023-12-13

## 2024-01-08 ENCOUNTER — APPOINTMENT (OUTPATIENT)
Dept: NEUROLOGY | Facility: CLINIC | Age: 64
End: 2024-01-08

## 2024-02-05 ENCOUNTER — APPOINTMENT (OUTPATIENT)
Dept: NEUROLOGY | Facility: CLINIC | Age: 64
End: 2024-02-05

## 2024-02-06 ENCOUNTER — APPOINTMENT (OUTPATIENT)
Dept: NEUROLOGY | Facility: CLINIC | Age: 64
End: 2024-02-06

## 2024-02-26 ENCOUNTER — TRANSCRIPTION ENCOUNTER (OUTPATIENT)
Age: 64
End: 2024-02-26

## 2024-03-15 ENCOUNTER — APPOINTMENT (OUTPATIENT)
Dept: NEUROLOGY | Facility: CLINIC | Age: 64
End: 2024-03-15

## 2024-03-25 ENCOUNTER — APPOINTMENT (OUTPATIENT)
Age: 64
End: 2024-03-25
Payer: COMMERCIAL

## 2024-03-25 PROCEDURE — D9310: CPT

## 2024-03-25 PROCEDURE — D0330 PANORAMIC RADIOGRAPHIC IMAGE: CPT

## 2024-03-25 PROCEDURE — D0220: CPT

## 2024-04-25 ENCOUNTER — NON-APPOINTMENT (OUTPATIENT)
Age: 64
End: 2024-04-25

## 2024-04-25 ENCOUNTER — APPOINTMENT (OUTPATIENT)
Dept: OPHTHALMOLOGY | Facility: CLINIC | Age: 64
End: 2024-04-25
Payer: MEDICAID

## 2024-04-25 PROCEDURE — 87809 ADENOVIRUS ASSAY W/OPTIC: CPT | Mod: QW,RT

## 2024-04-25 PROCEDURE — 92012 INTRM OPH EXAM EST PATIENT: CPT

## 2024-04-29 ENCOUNTER — RX RENEWAL (OUTPATIENT)
Age: 64
End: 2024-04-29

## 2024-04-30 ENCOUNTER — RX RENEWAL (OUTPATIENT)
Age: 64
End: 2024-04-30

## 2024-04-30 RX ORDER — ASPIRIN 81 MG/1
81 TABLET, COATED ORAL
Qty: 90 | Refills: 3 | Status: ACTIVE | COMMUNITY
Start: 2024-04-30 | End: 1900-01-01

## 2024-05-01 ENCOUNTER — TRANSCRIPTION ENCOUNTER (OUTPATIENT)
Age: 64
End: 2024-05-01

## 2024-05-02 NOTE — ED PROVIDER NOTE - NS ED MD DISPO SPECIAL CONSIDERATION1
Transitional planning: Phone call to Savana-Director [facility]. Obtained patient PCP information-Dr. Stanley Lu, -[phone]. Confirmed son is legal guardian. She states that patient is independent at baseline. She also states that patient has had 4 months of heavy bleeding, was to have a D&C then hysterectomy. Was admitted to [facility] after preadmission testing and received 2 units of blood for anemia. Was found to not be able to do the D&C due to the vaginal masses and sent here. Has had a cardiac workup and clearance done already.    None

## 2024-05-13 ENCOUNTER — TRANSCRIPTION ENCOUNTER (OUTPATIENT)
Age: 64
End: 2024-05-13

## 2024-05-13 RX ORDER — LEVETIRACETAM 500 MG/1
500 TABLET, FILM COATED, EXTENDED RELEASE ORAL
Qty: 180 | Refills: 1 | Status: ACTIVE | COMMUNITY
Start: 2023-11-09 | End: 1900-01-01

## 2024-05-13 RX ORDER — LAMOTRIGINE 100 MG/1
100 TABLET ORAL TWICE DAILY
Qty: 180 | Refills: 1 | Status: ACTIVE | COMMUNITY
Start: 2023-08-01 | End: 1900-01-01

## 2024-05-14 ENCOUNTER — APPOINTMENT (OUTPATIENT)
Dept: OPHTHALMOLOGY | Facility: CLINIC | Age: 64
End: 2024-05-14

## 2024-07-16 NOTE — ED ADULT TRIAGE NOTE - HISTORY OF COVID-19 VACCINATION
Message left for patient to return call to clinic for referral scheduling.    Vaccine status unknown

## 2024-07-18 ENCOUNTER — APPOINTMENT (OUTPATIENT)
Dept: OPHTHALMOLOGY | Facility: CLINIC | Age: 64
End: 2024-07-18
Payer: MEDICAID

## 2024-07-18 ENCOUNTER — NON-APPOINTMENT (OUTPATIENT)
Age: 64
End: 2024-07-18

## 2024-07-18 PROCEDURE — 92015 DETERMINE REFRACTIVE STATE: CPT | Mod: NC

## 2024-07-18 PROCEDURE — 92012 INTRM OPH EXAM EST PATIENT: CPT | Mod: 25

## 2024-07-25 ENCOUNTER — NON-APPOINTMENT (OUTPATIENT)
Age: 64
End: 2024-07-25

## 2024-07-25 ENCOUNTER — APPOINTMENT (OUTPATIENT)
Dept: OPHTHALMOLOGY | Facility: CLINIC | Age: 64
End: 2024-07-25
Payer: MEDICAID

## 2024-07-25 PROCEDURE — 92012 INTRM OPH EXAM EST PATIENT: CPT

## 2024-08-05 ENCOUNTER — TRANSCRIPTION ENCOUNTER (OUTPATIENT)
Age: 64
End: 2024-08-05

## 2024-08-26 ENCOUNTER — APPOINTMENT (OUTPATIENT)
Dept: OPHTHALMOLOGY | Facility: CLINIC | Age: 64
End: 2024-08-26

## 2024-09-05 NOTE — PATIENT PROFILE ADULT - SURGICAL SITE INCISION
Emergency Department Resident Note    Patient: America Randle Age: 56 year old Sex: female   MRN: [id number] : 1968 Encounter Date: 2024     Chief Complaint   Patient presents with    Abdominal Pain       HISTORY OF PRESENT ILLNESS:  56 year old female with a history of DM, HTN, thyroid disorder, pancreatitis, GERD presents with bilateral flank pain that radiates around to the front.  This has been ongoing for 1 week.  States the pain is worse with eating, so she has had decreased p.o. intake recently. She has been nauseated for a few days and had 2 episodes of vomiting this morning.  Has had loose stools.  No diarrhea, chest pain, shortness of breath, dysuria, hematuria, increased urinary frequency.  She states she is still passing gas.    History of several abdominal surgeries, including 2 hernia repairs, cholecystectomy, hysterectomy    Review of Systems:  Relevant ROS as per HPI and MDM    PHYSICAL EXAMINATION  Physical Exam  Constitutional:       General: She is not in acute distress.     Appearance: She is not toxic-appearing.   HENT:      Head: Normocephalic and atraumatic.      Right Ear: External ear normal.      Left Ear: External ear normal.      Nose: Nose normal.      Mouth/Throat:      Pharynx: Oropharynx is clear.      Neck: Neck supple.   Eyes:      General:         Right eye: No discharge.         Left eye: No discharge.   Cardiovascular:      Rate and Rhythm: Normal rate.   Pulmonary:      Effort: Pulmonary effort is normal. No respiratory distress.      Breath sounds: Normal breath sounds.   Abdominal:      Palpations: Abdomen is soft.      Tenderness: There is abdominal tenderness.      Hernia: A hernia is present.      Comments: Large ventral hernia that is moderately tender    Moderate right-sided CVA tenderness.   Musculoskeletal:         General: No deformity.      Right lower leg: No edema.      Left lower leg: No edema.   Skin:     General: Skin is warm and dry.       Capillary Refill: Capillary refill takes less than 2 seconds.   Neurological:      General: No focal deficit present.      Mental Status: She is alert.   Psychiatric:         Behavior: Behavior normal.         MDM:   56 year old female with a history of DM, HTN, thyroid disorder, pancreatitis, GERD presents with bilateral flank pain that radiates around to the front x 1 week, worse with eating. +decreased p.o. +nausea for few days. +vomiting today.  Hx of several abdominal surgeries, including 2 hernia repairs, cholecystectomy, hysterectomy    On exam, ventral hernia present and moderate tenderness at site  WBC 12.6. Afebrile.  Given morphine, pepcid, zofran, fluids.    Will sign out CT scan to oncoming team.     Anticipate plan as follows:  If CT negative, admit to floor with GI on consult  If CT has concerns related to hernia or SBO, then consult surg and admit afterward         ED Course as of 09/05/24 0837   Thu Sep 05, 2024   0444 Lipase: 35 [SS]   0822 IMPRESSION:  1.   No acute abnormality of the abdomen or pelvis.  2.   Hepatomegaly and fatty liver.  3.   Sigmoid diverticulosis.      [RC]      ED Course User Index  [RC] Davon Barillas MD  [SS] Jose Luis Toledo MD     Procedures    ED Triage Vitals [09/04/24 1938]   ED Triage Vitals Group      Temp 97.9 °F (36.6 °C)      Heart Rate 83      Resp 17      BP (!) 137/91      SpO2 96 %      EtCO2 mmHg       Height       Weight       Weight Scale Used       BMI (Calculated)       IBW/kg (Calculated)      ED Medication Orders (From admission, onward)      Ordered Start     Status Ordering Provider    09/05/24 0801 09/05/24 0802  morphine injection 4 mg  ONCE         Last MAR action: Given MARTIN STONE    09/05/24 0534 09/05/24 0535  ondansetron (ZOFRAN) injection 4 mg  ONCE         Last MAR action: Given JOSE LUIS TOLEDO    09/05/24 0534 09/05/24 0535  morphine injection 4 mg  ONCE         Last MAR action: Given JOSE LUIS TOLEDO    09/05/24 0427 09/05/24 0428  lactated  ringers bolus 1,000 mL  ONCE         Last MAR action: Completed XENAPAULINE FERRIS    24 04224  famotidine (PEPCID) injection 20 mg  ONCE         Last MAR action: Given PAULINE LACKEY    24 0427 24  ondansetron (ZOFRAN) injection 4 mg  ONCE         Last MAR action: Given PAULINE LACKEY    24 0427 24  morphine injection 4 mg  ONCE         Last MAR action: Given PAULINE LACKEY              PAST HISTORY         Past Medical History:   Diagnosis Date    Acute kidney injury (CMD) 2021    Acute medial meniscus tear of left knee 2019    Arthritis     Corneal dystrophy     Diabetes mellitus  (CMD)     Gastritis     Morbid obesity  (CMD)     Nausea 2022    Other chronic pancreatitis  (CMD) 2019    Pancreatitis (CMD)     PONV (postoperative nausea and vomiting)     Retinitis pigmentosa     Thyroid disease     Past Surgical History:   Procedure Laterality Date    BACK SURGERY       SECTION, CLASSIC      x2    CHOLECYSTECTOMY  1989    HYSTERECTOMY  2009    total abd hyster    KNEE SURGERY      REMOVAL GALLBLADDER      UMBILICAL HERNIA REPAIR               Social History     Tobacco Use    Smoking status: Former     Types: Cigarettes     Passive exposure: Past    Smokeless tobacco: Never   Vaping Use    Vaping status: never used   Substance Use Topics    Alcohol use: Never    Drug use: Never    Family History   Problem Relation Age of Onset    Hypertension Mother     Cancer, Lung Mother     Hypertension Brother     Heart disease Paternal Grandmother     Coronary Artery Disease Paternal Grandmother     Cancer, Lung Other              Prior to Admission medications    Medication Sig Start Date End Date Taking? Authorizing Provider   losartan (COZAAR) 50 MG tablet Take one tablet daily. 24   Lisa Root MD   gabapentin (NEURONTIN) 300 MG capsule Take 1 capsule by mouth in the morning and 1 capsule in the evening. 24    Lisa Root MD   Continuous Glucose Sensor (FreeStyle Jadon 2 Sensor) Misc 1 each every 14 days. 8/27/24   Anahi Harper MD   pantoprazole (PROTONIX) 40 MG tablet Take 1 tablet by mouth in the morning and 1 tablet in the evening. 8/20/24 11/18/24  Nataly Kong MD   ondansetron (ZOFRAN) 8 MG tablet Take 1 tablet by mouth every 8 hours as needed for Nausea. 8/20/24   Nataly Kong MD   insulin glargine (Basaglar KwikPen) 100 UNIT/ML pen-injector Inject 55 Units into the skin daily. 8/13/24   Anahi Harper MD   NovoLOG FlexPen 100 UNIT/ML pen-injector Inject 28 Units into the skin in the morning and 28 Units in the evening. Before meals 6/4/24   Anahi Harper MD   metformin (GLUCOPHAGE) 1000 MG tablet Take 1 tablet by mouth in the morning and 1 tablet in the evening. Take with meals. 5/16/24   Anahi Harper MD   levothyroxine 75 MCG tablet TAKE 1 TABLET DAILY 5/16/24   Anahi Harper MD   FeroSul 325 (65 Fe) MG tablet Take 1 tablet by mouth every other day. 4/15/24   Anahi Harper MD   Insulin Pen Needle (B-D U/F PEN NEEDLE) 31G X 5 MM Misc daily. Use to inject insulin 3 times daily. Remove needle cover(s) to expose needle before injecting. 3/30/24   Dora Arrington DO   simvastatin (ZOCOR) 40 MG tablet Take 1 tablet by mouth every evening. 3/19/24   Dora Arrington DO   escitalopram (LEXAPRO) 20 MG tablet Take 1 tablet by mouth daily. 3/19/24   Dora Arrington DO   Rhubarb (Estroven Complete) 4 MG Tab     Provider, Outside   Cholecalciferol (Vitamin D-3) 25 mcg (1,000 units) capsule     Provider, Outside   cyclobenzaprine (FLEXERIL) 5 MG tablet Take 1 tablet by mouth 3 times daily as needed for Muscle spasms. 11/15/22   Maria Del Carmen Molina MD   HYDROcodone-acetaminophen (NORCO) 7.5-325 MG per tablet Take 1 tablet by mouth every 12 hours as needed for Pain. 10/21/22   Patricio Harrison,    chlorhexidine gluconate (Peridex) 0.12 % solution Swish and spit 15 mLs 2 times daily. 5/31/22   Demetrius,  Blanca MARINA MD   Multiple Vitamins-Minerals (MULTIVITAMIN ADULTS 50+ PO) Take 2 tablets by mouth daily.    Provider, Outside   OneTouch Delica Lancets 33G Misc Use to check blood sugars 2-3x a day 3/17/22   Anahi Harper MD   blood glucose (OneTouch Verio) test strip Test blood sugar 2-3 times daily as directed. Diagnosis: E11.65 Meter: One Touch Verio 3/16/22   Anahi Harper MD   MELATONIN PO Take 10 mg by mouth nightly as needed (Sleep).     Provider, Outside   polyvinyl alcohol-povidone PF (Refresh) 1.4-0.6 % ophthalmic solution Place 1-2 drops into both eyes every 3 hours.    Provider, Outside    Allergies   Allergen Reactions    Byetta Other (See Comments)     history of pancreatitis not related to this medicine    Dapagliflozin Other (See Comments)     pt had vaginal irritation and itching, no allergic reaction  dehydration and vaginal itching    Liraglutide Other (See Comments)     pt has history of pancreatitis (victoza)          Labs:   Results for orders placed or performed during the hospital encounter of 09/05/24   Comprehensive Metabolic Panel    Specimen: Blood, Venous   Result Value Ref Range    Fasting Status      Sodium 142 135 - 145 mmol/L    Potassium 4.0 3.4 - 5.1 mmol/L    Chloride 108 97 - 110 mmol/L    Carbon Dioxide 28 21 - 32 mmol/L    Anion Gap 10 7 - 19 mmol/L    Glucose 141 (H) 70 - 99 mg/dL    BUN 15 6 - 20 mg/dL    Creatinine 0.55 0.51 - 0.95 mg/dL    Glomerular Filtration Rate >90 >=60    BUN/Cr 27 (H) 7 - 25    Calcium 9.6 8.4 - 10.2 mg/dL    Bilirubin, Total 0.2 0.2 - 1.0 mg/dL    GOT/AST 19 <=37 Units/L    GPT/ALT 28 <64 Units/L    Alkaline Phosphatase 85 45 - 117 Units/L    Albumin 3.2 (L) 3.6 - 5.1 g/dL    Protein, Total 7.1 6.4 - 8.2 g/dL    Globulin 3.9 2.0 - 4.0 g/dL    A/G Ratio 0.8 (L) 1.0 - 2.4   Lipase    Specimen: Blood, Venous   Result Value Ref Range    Lipase 35 15 - 77 Units/L   CBC with Automated Differential (performable only)    Specimen: Blood, Venous   Result  Value Ref Range    WBC 12.6 (H) 4.2 - 11.0 K/mcL    RBC 4.54 4.00 - 5.20 mil/mcL    HGB 12.7 12.0 - 15.5 g/dL    HCT 39.7 36.0 - 46.5 %    MCV 87.4 78.0 - 100.0 fl    MCH 28.0 26.0 - 34.0 pg    MCHC 32.0 32.0 - 36.5 g/dL    RDW-CV 15.3 (H) 11.0 - 15.0 %    RDW-SD 49.1 39.0 - 50.0 fL     140 - 450 K/mcL    NRBC 0 <=0 /100 WBC    Neutrophil, Percent 71 %    Lymphocytes, Percent 18 %    Mono, Percent 8 %    Eosinophils, Percent 2 %    Basophils, Percent 0 %    Immature Granulocytes 1 %    Absolute Neutrophils 8.9 (H) 1.8 - 7.7 K/mcL    Absolute Lymphocytes 2.3 1.0 - 4.0 K/mcL    Absolute Monocytes 1.0 (H) 0.3 - 0.9 K/mcL    Absolute Eosinophils  0.3 0.0 - 0.5 K/mcL    Absolute Basophils 0.0 0.0 - 0.3 K/mcL    Absolute Immature Granulocytes 0.1 0.0 - 0.2 K/mcL   TROPONIN I, HIGH SENSITIVITY    Specimen: Blood, Venous   Result Value Ref Range    Troponin I, High Sensitivity <4 <52 ng/L     Imaging:   CT ABDOMEN PELVIS W CONTRAST   Final Result   1.   No acute abnormality of the abdomen or pelvis.   2.   Hepatomegaly and fatty liver.   3.   Sigmoid diverticulosis.                  Electronically Signed by: KD AMEZCUA MD    Signed on: 9/5/2024 8:10 AM    Workstation ID: [id number]            IMPRESSION  ED Diagnosis   1. Pain of upper abdomen        2. Nausea and vomiting, unspecified vomiting type            DISPOSITION  Admit 9/5/2024  8:31 AM  Telemetry Bed?: No  Admitting Physician: EDENILSON ASHFORD [[id number]]  Is this a telephone or verbal order?: This is a telephone order from the admitting physician      Note to patient: The 21st Century Cures Act makes medical notes available to patients in the interest of transparency. Please be advised this note is a medical document. Medical documents are intended to carry relevant information, convey facts as evidence, and include the clinical opinion/interpretation of the practitioner. The medical note is intended as peer to peer communication and may appear blunt  or direct. It is written in medical language and may contain abbreviations or verbiage that are unfamiliar. This note was created using voice recognition technology and may include inadvertent transcriptional errors. Any such errors should be contextually interpreted.     Iris Toledo MD  [facility] [id number]  Emergency Medicine, PGY3         Iris Toledo MD  Resident  09/05/24 8161     no

## 2024-09-09 ENCOUNTER — APPOINTMENT (OUTPATIENT)
Dept: OPHTHALMOLOGY | Facility: CLINIC | Age: 64
End: 2024-09-09

## 2024-09-15 ENCOUNTER — NON-APPOINTMENT (OUTPATIENT)
Age: 64
End: 2024-09-15

## 2024-09-16 ENCOUNTER — APPOINTMENT (OUTPATIENT)
Dept: NEUROLOGY | Facility: CLINIC | Age: 64
End: 2024-09-16
Payer: MEDICAID

## 2024-09-16 VITALS
WEIGHT: 126 LBS | DIASTOLIC BLOOD PRESSURE: 80 MMHG | SYSTOLIC BLOOD PRESSURE: 180 MMHG | HEART RATE: 88 BPM | HEIGHT: 61 IN | BODY MASS INDEX: 23.79 KG/M2

## 2024-09-16 DIAGNOSIS — R53.82 CHRONIC FATIGUE, UNSPECIFIED: ICD-10-CM

## 2024-09-16 DIAGNOSIS — R42 DIZZINESS AND GIDDINESS: ICD-10-CM

## 2024-09-16 DIAGNOSIS — F41.9 ANXIETY DISORDER, UNSPECIFIED: ICD-10-CM

## 2024-09-16 DIAGNOSIS — F32.A ANXIETY DISORDER, UNSPECIFIED: ICD-10-CM

## 2024-09-16 DIAGNOSIS — R20.0 ANESTHESIA OF SKIN: ICD-10-CM

## 2024-09-16 DIAGNOSIS — G40.909 EPILEPSY, UNSPECIFIED, NOT INTRACTABLE, W/OUT STATUS EPILEPTICUS: ICD-10-CM

## 2024-09-16 PROCEDURE — 99214 OFFICE O/P EST MOD 30 MIN: CPT

## 2024-09-16 PROCEDURE — G2211 COMPLEX E/M VISIT ADD ON: CPT | Mod: NC

## 2024-09-16 NOTE — HISTORY OF PRESENT ILLNESS
[FreeTextEntry1] : Rx: LEV 1500mg bid (LEV 70)-> XR 500mg bid; LTG 100mg bid  (7.5->9.5) level), Losartan only 12.5mg, ASA Trials:  LAC 150mg bid   Updates:  Concerns including dizziness, tiredness, fatigue, thirst, hand tingling, visual complaints, thinning of hair, intermittent memory complaints, headache.  Once witnessed after walking up stairs lasting 1hr.  Does not happen when lying down or sitting. Got new glasses with some improvement, saw optho.   Reports anxiety.  Psychotherapy ongoing.  Meditation, breathing.  Not similar to aura. Not too much exercise.  had been doing better up until last month, then episodes of RUE/LE paraesthesias/numbness, dizziness after trip to [place], sleep deprivation/time change.     ROS: P lost  2021, and p COVID, felt tired/fatigue, numbness in hands/legs, in 2022,  LEV lowered at that time. Anxiety an issue. Going to psychotherapy Unsteadiness p meds RUE feels heavy sometimes. Sometimes R leg.   HPI:  64F with seizures since age 18, q yearly around that time baseline (sensory changes in hands, feet, both sides feeling light).  On some meds intermittently per parents in [place]  to  she was seizure free off medications.  Seizures improved from age 38->age 51  2011 Had witnessed FTBTCS at that time.  Convulsion, eye rolling witnessed per lilo. Had repeat sz 4yrs later .   forgot meds for two days, had GTC last 30 seconds, +urinary incontinence and tongue bite and came to quicker than  2023 taken to [facility] admitted for 10 days. felt could not move arms/legs "felt like I am dying"  difficulty speaking. lilo came, broke door down, AMS.  In ER sz observed in waiting area x 1min.  pt states she was taking ibuprofen instead of LEV for some time prior x 2-3weeks?  restarted on LEV, but 11 breakthrough szs noted day 1. 5 sz next day LEV increased and LAC added to 150mg bid.  LTG added. P DC, LEV reduced, LAC reduced, LTG titrated due to ADRs  EE2023  Day 1: Left posterior temporal slowing Rare left posterior temporal P7 max sharp wave interictal epileptiform discharges were present. 11 electrographic seizures captured. Seizures are poorly lateralized, but appear to originate from the posterior head region. Seizures characterized by initial sharply contoured delta activity from the posterior head region (O1/P7 > O2/P8) that evolves to rhythmic theta activity. For some of the seizures, particularly the ones earlier in the study, there was rapid diffuse generalization of the rhythmic theta activity. Seizure duration was 30-60 seconds. Day 2:  7 electrographic seizures captured. Seizures appear to originate from bilateral (left > right) parietal regions. Seizures characterized by initial sharply contoured delta activity from the posterior parietal head region (P7/P3 > P8/P4) that evolves to rhythmic theta activity, with rapid generalization. A few of the seizures had intermittent cessation (<10 second) before continuation of theta activity. Seizure duration was 3-5mins. Day 3:  2 electrographic seizures captured. Seizures appear to originate from bilateral (left > right) parietal regions. Seizures characterized by initial sharply contoured delta activity from the posterior parietal head region (P7/P3 > P8/P4) that evolves to rhythmic theta activity, with rapid generalization.     [Brivaracetam] : brivaracetam [Lacosamide (Vimpat)] : lacosamide (Vimpat) [Lamotrigine (Lamictal)] : lamotrigine (Lamictal) [Levetiracetam (Keppra)] : levetiracetam (Keppra)

## 2024-09-16 NOTE — DISCUSSION/SUMMARY
[Simple Partial] : simple partial [Complex Partial] : complex partial [Secondary Generalization] : secondary generalization [Idiopathic] : idiopathic  [Focal] : focal [Risks Associated with Driving/[place] Law] : As per my usual protocol, the patient was advised in regards to risks and driving privileges associated with the [place] Guidelines.  [Safety Recommendations] : The patient was advised in regards to the risk of seizures and general seizure safety recommendations including not to be bathing alone, climbing to high places and operating heavy machinery. [Compliance with Medications] : The importance of compliance with medications was reinforced. [Medication Side Effects] : High frequency and serious potential medication adverse effects were reviewed with the patient, including but not exclusive to psychiatric effects.  Information sheets on medication side effects were made available to the patient in our clinic.  The patient or advocate agrees to notify us for any concerns. [Risk of Death] : Risk of death associated with seizures / SUDEP was discussed. [FreeTextEntry1] : Ms. SUSHMA MAINI is a 64 year old F under evaluation for focal seizure disorder:  Differential diagnosis/localization: L posterior temporal / quadrant suspected Risk factors: seizures as teen -6/2023 MRI Brain without contrast, epilepsy protoco: Scattered nonspecific nonenhancing foci of T2 and FLAIR hyperintense signal in the cerebral white matter.  same on 12/2023.  - -Other issues: -Anxiety, somatic complaints -Mood issues in 2022 p loss of  -Labile HTN a concern with reports of dizziness with positional change.  Neg orthostatics today  Some recent RUE sensory complaints p travel to [place].  Plan: Current recommendations are to proceed with: -f/u AEEG x 48hrs for ongoing intermittent complaints  -LTG, possible eventual taper of LEV XR.  Off LAC.  Consider BRV trial, GBP, PGB, ZNS, TPM as options. -ongoing psychotherapy recommended -rec f/u with PMD re labile BP, dizziness. -ASA 81mg/d -fuv 5-6mo  Education provided regarding clinical diagnosis and plan.  Patient was given the opportunity to ask further questions in regards to their care.  x35min

## 2024-09-16 NOTE — PHYSICAL EXAM
[FreeTextEntry1] : Orthostatics:  /81 HR 96 lying;  standing 175/93 98  2nd: 175/81  99  General Constitutional: alert and in no acute distress.  Psychiatric: affect normal, insight and judgment intact. Neurologic:  Orientation: oriented to person, place, and time  Attention: normal concentrating ability and attention was not decreased.  Language: no difficulty naming common objects, repeating a phrase, writing a sentence; fluency, comprehension, and reading intact.  Fund of knowledge: displays adequate knowledge of personal past history.  Cranial Nerves: visual acuity intact bilaterally, visual fields full to confrontation, pupils unequal  R 4->3mm >>L 2->1mm reactive to light, no diplopia, extraocular motion intact, facial sensation intact symmetrically, face symmetrical, hearing was intact bilaterally, tongue and palate midline, head turning and shoulder shrug symmetric and there was no tongue deviation with protrusion.  Motor: muscle tone was normal in all four extremities, muscle strength was normal in all four extremities and normal bulk in all four extremities.  Coordination:. normal gait. balance was intact. there was no past-pointing. no tremor present.  Deep tendon reflexes:  Biceps right 2+. Biceps left 2+.   Triceps right 1+. Triceps left 1+.   Brachioradialis right 1+. Brachioradialis left 1+.   Patella right 2+. Patella left 2+.   Ankle jerk right 1+. Ankle jerk left 1+.  Eyes: the sclera and conjunctiva were normal.  Neck: the appearance of the neck was normal.  Musculoskeletal: no clubbing or cyanosis of the fingernails.  Skin: no lesions, rash

## 2024-09-20 ENCOUNTER — APPOINTMENT (OUTPATIENT)
Dept: OPHTHALMOLOGY | Facility: EYE CENTER | Age: 64
End: 2024-09-20

## 2024-09-21 ENCOUNTER — APPOINTMENT (OUTPATIENT)
Dept: OPHTHALMOLOGY | Facility: CLINIC | Age: 64
End: 2024-09-21

## 2024-09-24 RX ORDER — BRIVARACETAM 50 MG/1
50 TABLET, FILM COATED ORAL TWICE DAILY
Qty: 180 | Refills: 1 | Status: ACTIVE | COMMUNITY
Start: 2024-09-16 | End: 1900-01-01

## 2024-10-14 ENCOUNTER — APPOINTMENT (OUTPATIENT)
Dept: NEUROLOGY | Facility: CLINIC | Age: 64
End: 2024-10-14

## 2024-10-29 ENCOUNTER — TRANSCRIPTION ENCOUNTER (OUTPATIENT)
Age: 64
End: 2024-10-29

## 2024-11-07 ENCOUNTER — APPOINTMENT (OUTPATIENT)
Dept: NEUROLOGY | Facility: CLINIC | Age: 64
End: 2024-11-07
Payer: MEDICAID

## 2024-11-07 DIAGNOSIS — R20.0 ANESTHESIA OF SKIN: ICD-10-CM

## 2024-11-07 PROCEDURE — 95816 EEG AWAKE AND DROWSY: CPT

## 2024-11-08 ENCOUNTER — TRANSCRIPTION ENCOUNTER (OUTPATIENT)
Age: 64
End: 2024-11-08

## 2024-11-08 ENCOUNTER — APPOINTMENT (OUTPATIENT)
Dept: NEUROLOGY | Facility: CLINIC | Age: 64
End: 2024-11-08

## 2024-11-08 PROCEDURE — 95719 EEG PHYS/QHP EA INCR W/O VID: CPT

## 2024-11-08 PROCEDURE — 95700 EEG CONT REC W/VID EEG TECH: CPT

## 2024-11-08 PROCEDURE — 95708 EEG WO VID EA 12-26HR UNMNTR: CPT

## 2024-11-12 ENCOUNTER — TRANSCRIPTION ENCOUNTER (OUTPATIENT)
Age: 64
End: 2024-11-12

## 2024-11-13 ENCOUNTER — TRANSCRIPTION ENCOUNTER (OUTPATIENT)
Age: 64
End: 2024-11-13

## 2024-12-12 ENCOUNTER — APPOINTMENT (OUTPATIENT)
Dept: NEUROLOGY | Facility: CLINIC | Age: 64
End: 2024-12-12
Payer: MEDICAID

## 2024-12-12 DIAGNOSIS — R20.0 ANESTHESIA OF SKIN: ICD-10-CM

## 2024-12-12 PROCEDURE — 95886 MUSC TEST DONE W/N TEST COMP: CPT

## 2024-12-12 PROCEDURE — 95885 MUSC TST DONE W/NERV TST LIM: CPT | Mod: RT

## 2024-12-12 PROCEDURE — 95912 NRV CNDJ TEST 11-12 STUDIES: CPT

## 2025-01-03 ENCOUNTER — RX RENEWAL (OUTPATIENT)
Age: 65
End: 2025-01-03

## 2025-02-04 ENCOUNTER — TRANSCRIPTION ENCOUNTER (OUTPATIENT)
Age: 65
End: 2025-02-04

## 2025-02-24 ENCOUNTER — APPOINTMENT (OUTPATIENT)
Dept: NEUROLOGY | Facility: CLINIC | Age: 65
End: 2025-02-24

## 2025-04-22 NOTE — ED PROVIDER NOTE - NS ED MD DISPO DISCHARGE
[Diarrhea: Grade 0] : Diarrhea: Grade 0 [Negative] : Allergic/Immunologic [Hot Flashes] : hot flashes [Joint Pain] : no joint pain Home

## 2025-07-20 ENCOUNTER — NON-APPOINTMENT (OUTPATIENT)
Age: 65
End: 2025-07-20

## 2025-09-02 ENCOUNTER — TRANSCRIPTION ENCOUNTER (OUTPATIENT)
Age: 65
End: 2025-09-02

## 2025-09-04 ENCOUNTER — TRANSCRIPTION ENCOUNTER (OUTPATIENT)
Age: 65
End: 2025-09-04

## 2025-09-05 ENCOUNTER — TRANSCRIPTION ENCOUNTER (OUTPATIENT)
Age: 65
End: 2025-09-05